# Patient Record
Sex: FEMALE | Race: WHITE | NOT HISPANIC OR LATINO | Employment: OTHER | URBAN - METROPOLITAN AREA
[De-identification: names, ages, dates, MRNs, and addresses within clinical notes are randomized per-mention and may not be internally consistent; named-entity substitution may affect disease eponyms.]

---

## 2018-01-30 ENCOUNTER — OFFICE VISIT (OUTPATIENT)
Dept: OBSTETRICS AND GYNECOLOGY | Facility: CLINIC | Age: 58
End: 2018-01-30
Payer: COMMERCIAL

## 2018-01-30 ENCOUNTER — HOSPITAL ENCOUNTER (OUTPATIENT)
Dept: RADIOLOGY | Facility: HOSPITAL | Age: 58
Discharge: HOME | End: 2018-01-30
Attending: OBSTETRICS & GYNECOLOGY
Payer: COMMERCIAL

## 2018-01-30 VITALS
HEIGHT: 67 IN | WEIGHT: 123.44 LBS | WEIGHT: 123 LBS | SYSTOLIC BLOOD PRESSURE: 118 MMHG | DIASTOLIC BLOOD PRESSURE: 74 MMHG | BODY MASS INDEX: 19.3 KG/M2 | BODY MASS INDEX: 19.37 KG/M2 | HEIGHT: 67 IN

## 2018-01-30 DIAGNOSIS — F41.9 ANXIETY: Chronic | ICD-10-CM

## 2018-01-30 DIAGNOSIS — Z12.4 ENCOUNTER FOR PAP SMEAR OF CERVIX WITH HPV DNA COTESTING: Primary | ICD-10-CM

## 2018-01-30 DIAGNOSIS — Z12.31 VISIT FOR SCREENING MAMMOGRAM: ICD-10-CM

## 2018-01-30 DIAGNOSIS — A60.00 GENITAL HERPES SIMPLEX, UNSPECIFIED SITE: ICD-10-CM

## 2018-01-30 PROCEDURE — 77067 SCR MAMMO BI INCL CAD: CPT | Mod: TC,PN

## 2018-01-30 PROCEDURE — 87624 HPV HI-RISK TYP POOLED RSLT: CPT

## 2018-01-30 PROCEDURE — 88175 CYTOPATH C/V AUTO FLUID REDO: CPT

## 2018-01-30 PROCEDURE — 99396 PREV VISIT EST AGE 40-64: CPT | Mod: S$GLB,,, | Performed by: OBSTETRICS & GYNECOLOGY

## 2018-01-30 PROCEDURE — 77067 SCR MAMMO BI INCL CAD: CPT | Mod: 26,,, | Performed by: RADIOLOGY

## 2018-01-30 PROCEDURE — 77063 BREAST TOMOSYNTHESIS BI: CPT | Mod: 26,,, | Performed by: RADIOLOGY

## 2018-01-30 PROCEDURE — 99999 PR PBB SHADOW E&M-EST. PATIENT-LVL III: CPT | Mod: PBBFAC,,, | Performed by: OBSTETRICS & GYNECOLOGY

## 2018-01-30 RX ORDER — VALACYCLOVIR HYDROCHLORIDE 500 MG/1
500 TABLET, FILM COATED ORAL DAILY
Qty: 30 TABLET | Refills: 11 | Status: SHIPPED | OUTPATIENT
Start: 2018-01-30 | End: 2019-07-10 | Stop reason: SDUPTHER

## 2018-01-30 RX ORDER — ALPRAZOLAM 0.25 MG/1
0.25 TABLET ORAL 3 TIMES DAILY PRN
Qty: 30 TABLET | Refills: 0 | Status: SHIPPED | OUTPATIENT
Start: 2018-01-30 | End: 2018-09-11 | Stop reason: SDUPTHER

## 2018-01-30 RX ORDER — VALACYCLOVIR HYDROCHLORIDE 500 MG/1
500 TABLET, FILM COATED ORAL 2 TIMES DAILY
Qty: 10 TABLET | Refills: 5 | Status: SHIPPED | OUTPATIENT
Start: 2018-01-30 | End: 2018-01-30 | Stop reason: SDUPTHER

## 2018-01-30 NOTE — PROGRESS NOTES
Chief Complaint   Patient presents with    Well Woman       History of Present Illness: Mely Viera is a 57 y.o. female that presents today 1/30/2018 for well gyn visit. She reports having a genital outbreak today. She reports 5 outbreaks in the last year.     Past Medical History:   Diagnosis Date    Anxiety     Depression     HSV (herpes simplex virus) infection     Sleep disturbance        Past Surgical History:   Procedure Laterality Date    NONE         Current Outpatient Prescriptions   Medication Sig Dispense Refill    alprazolam (XANAX) 0.25 MG tablet Take 1 tablet (0.25 mg total) by mouth 3 (three) times daily as needed for Anxiety. 30 tablet 0    ALPRAZolam (XANAX) 0.25 MG tablet Take 1 tablet (0.25 mg total) by mouth 3 (three) times daily as needed for Anxiety. 30 tablet 0    citalopram (CELEXA) 10 MG tablet Take 1 tablet (10 mg total) by mouth once daily. 30 tablet 2    valACYclovir (VALTREX) 500 MG tablet Take 1 tablet (500 mg total) by mouth once daily. 30 tablet 11     No current facility-administered medications for this visit.        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      Social History Main Topics    Smoking status: Never Smoker    Smokeless tobacco: Never Used    Alcohol use 1.2 oz/week     2 Glasses of wine per week    Drug use: No    Sexual activity: Not Currently     Partners: Male     Birth control/ protection: None, Post-menopausal     Other  "Topics Concern    None     Social History Narrative    None       OB History    Para Term  AB Living   1 1 1     1   SAB TAB Ectopic Multiple Live Births           1      # Outcome Date GA Lbr Ruddy/2nd Weight Sex Delivery Anes PTL Lv   1 Term 10/09/13   3.289 kg (7 lb 4 oz) F Vag-Spont EPI N DARON          Review of Symptoms:  GENERAL: Denies weight gain or weight loss. Feeling well overall.   SKIN: Denies rash or lesions.   HEAD: Denies head injury or headache.   NODES: Denies enlarged lymph nodes.   CHEST: Denies chest pain or shortness of breath.   CARDIOVASCULAR: Denies palpitations or left sided chest pain.   ABDOMEN: No abdominal pain, constipation, diarrhea, nausea, vomiting or rectal bleeding.   URINARY: No frequency, dysuria, hematuria, or burning on urination.  HEMATOLOGIC: No easy bruisability or excessive bleeding.   MUSCULOSKELETAL: Denies joint pain or swelling.     /74   Ht 5' 7" (1.702 m)   Wt 56 kg (123 lb 7.3 oz)   LMP 2014   Physical Exam:  APPEARANCE: Well nourished, well developed, in no acute distress.  SKIN: Normal skin turgor, no lesions.  NECK: Neck symmetric without masses   RESPIRATORY: Normal respiratory effort with no retractions or use of accessory muscles  CARDIOVASCULAR: Peripheral vascular system with no swelling no varicosities and palpation of pulses normal  LYMPHATIC: No enlargements of the lymph nodes noted in the neck, axillae, or groin  ABDOMEN: Soft. No tenderness or masses. No hepatosplenomegaly. No hernias.  BREASTS: Symmetrical, no skin changes or visible lesions. No palpable masses, nipple discharge or adenopathy bilaterally.  PELVIC: Normal external female genitalia without lesions. Normal hair distribution. Adequate perineal body, normal urethral meatus. Urethra with no masses.  Bladder nontender. Vagina moist and well rugated without lesions or discharge. Cervix pink and without lesions. No significant cystocele or rectocele. Bimanual exam " showed uterus normal size, shape, position, mobile and nontender. Adnexa without masses or tenderness. Urethra and bladder normal.   EXTREMITIES: No clubbing cyanosis or edema.    ASSESSMENT/PLAN:  Encounter for Pap smear of cervix with HPV DNA cotesting  -     Liquid-based pap smear, screening  -     HPV High Risk Genotypes, PCR    Visit for screening mammogram  -     Cancel: Mammo Digital Screening Bilat With CAD; Future; Expected date: 01/30/2018    Anxiety    Genital herpes simplex, unspecified site  -     Discontinue: valACYclovir (VALTREX) 500 MG tablet; Take 1 tablet (500 mg total) by mouth 2 (two) times daily.  Dispense: 10 tablet; Refill: 5  -     valACYclovir (VALTREX) 500 MG tablet; Take 1 tablet (500 mg total) by mouth once daily.  Dispense: 30 tablet; Refill: 11    Other orders  -     ALPRAZolam (XANAX) 0.25 MG tablet; Take 1 tablet (0.25 mg total) by mouth 3 (three) times daily as needed for Anxiety.  Dispense: 30 tablet; Refill: 0          Patient was counseled today on Pap guidelines, recommendation for pelvic exams, mammograms every other year after the age of 40 and annually after the age of 50, Colonoscopy after the age of 50, Dexa Bone Scan and calcium and vitamin D supplementation in menopause and to see her PCP for other health maintenance.   FOLLOW-UP:prn

## 2018-02-02 LAB
HPV16 AG SPEC QL: NEGATIVE
HPV16+18+H RISK 12 DNA CVX-IMP: NEGATIVE
HPV18 DNA SPEC QL NAA+PROBE: NEGATIVE

## 2018-07-31 ENCOUNTER — OFFICE VISIT (OUTPATIENT)
Dept: FAMILY MEDICINE | Facility: CLINIC | Age: 58
End: 2018-07-31
Payer: COMMERCIAL

## 2018-07-31 VITALS
HEART RATE: 71 BPM | SYSTOLIC BLOOD PRESSURE: 120 MMHG | BODY MASS INDEX: 19.93 KG/M2 | TEMPERATURE: 98 F | OXYGEN SATURATION: 99 % | RESPIRATION RATE: 16 BRPM | DIASTOLIC BLOOD PRESSURE: 82 MMHG | WEIGHT: 127 LBS | HEIGHT: 67 IN

## 2018-07-31 DIAGNOSIS — I49.9 IRREGULAR HEART BEAT: ICD-10-CM

## 2018-07-31 DIAGNOSIS — E83.42 HYPOMAGNESEMIA: ICD-10-CM

## 2018-07-31 DIAGNOSIS — E83.39 HYPOPHOSPHATEMIA: ICD-10-CM

## 2018-07-31 DIAGNOSIS — R55 SYNCOPE AND COLLAPSE: ICD-10-CM

## 2018-07-31 DIAGNOSIS — G47.09 OTHER INSOMNIA: ICD-10-CM

## 2018-07-31 DIAGNOSIS — F41.1 GENERALIZED ANXIETY DISORDER: ICD-10-CM

## 2018-07-31 DIAGNOSIS — T67.5XXD HEAT EXHAUSTION, SUBSEQUENT ENCOUNTER: Primary | ICD-10-CM

## 2018-07-31 PROCEDURE — 99203 OFFICE O/P NEW LOW 30 MIN: CPT | Mod: S$GLB,,, | Performed by: FAMILY MEDICINE

## 2018-07-31 PROCEDURE — 3008F BODY MASS INDEX DOCD: CPT | Mod: CPTII,S$GLB,, | Performed by: FAMILY MEDICINE

## 2018-07-31 PROCEDURE — 99999 PR PBB SHADOW E&M-EST. PATIENT-LVL IV: CPT | Mod: PBBFAC,,, | Performed by: FAMILY MEDICINE

## 2018-08-01 ENCOUNTER — LAB VISIT (OUTPATIENT)
Dept: LAB | Facility: HOSPITAL | Age: 58
End: 2018-08-01
Attending: FAMILY MEDICINE
Payer: COMMERCIAL

## 2018-08-01 DIAGNOSIS — E83.42 HYPOMAGNESEMIA: ICD-10-CM

## 2018-08-01 DIAGNOSIS — R55 SYNCOPE AND COLLAPSE: ICD-10-CM

## 2018-08-01 DIAGNOSIS — E83.39 HYPOPHOSPHATEMIA: ICD-10-CM

## 2018-08-01 LAB
ALBUMIN SERPL BCP-MCNC: 4.1 G/DL
ALP SERPL-CCNC: 76 U/L
ALT SERPL W/O P-5'-P-CCNC: 25 U/L
ANION GAP SERPL CALC-SCNC: 8 MMOL/L
AST SERPL-CCNC: 26 U/L
BILIRUB SERPL-MCNC: 0.9 MG/DL
BUN SERPL-MCNC: 12 MG/DL
CALCIUM SERPL-MCNC: 9.4 MG/DL
CHLORIDE SERPL-SCNC: 105 MMOL/L
CO2 SERPL-SCNC: 28 MMOL/L
CREAT SERPL-MCNC: 0.9 MG/DL
EST. GFR  (AFRICAN AMERICAN): >60 ML/MIN/1.73 M^2
EST. GFR  (NON AFRICAN AMERICAN): >60 ML/MIN/1.73 M^2
GLUCOSE SERPL-MCNC: 100 MG/DL
MAGNESIUM SERPL-MCNC: 2.5 MG/DL
PHOSPHATE SERPL-MCNC: 3.4 MG/DL
POTASSIUM SERPL-SCNC: 4.3 MMOL/L
PROT SERPL-MCNC: 7.6 G/DL
SODIUM SERPL-SCNC: 141 MMOL/L
TSH SERPL DL<=0.005 MIU/L-ACNC: 3.16 UIU/ML

## 2018-08-01 PROCEDURE — 36415 COLL VENOUS BLD VENIPUNCTURE: CPT | Mod: PO

## 2018-08-01 PROCEDURE — 84100 ASSAY OF PHOSPHORUS: CPT

## 2018-08-01 PROCEDURE — 84443 ASSAY THYROID STIM HORMONE: CPT

## 2018-08-01 PROCEDURE — 80053 COMPREHEN METABOLIC PANEL: CPT

## 2018-08-01 PROCEDURE — 83735 ASSAY OF MAGNESIUM: CPT

## 2018-08-02 ENCOUNTER — TELEPHONE (OUTPATIENT)
Dept: FAMILY MEDICINE | Facility: CLINIC | Age: 58
End: 2018-08-02

## 2018-08-02 DIAGNOSIS — G44.319 ACUTE POST-TRAUMATIC HEADACHE, NOT INTRACTABLE: Primary | ICD-10-CM

## 2018-08-02 RX ORDER — NAPROXEN 500 MG/1
TABLET ORAL
Qty: 30 TABLET | Refills: 0 | Status: CANCELLED | OUTPATIENT
Start: 2018-08-02

## 2018-08-02 RX ORDER — NAPROXEN 500 MG/1
TABLET ORAL
Qty: 20 TABLET | Refills: 0 | Status: SHIPPED | OUTPATIENT
Start: 2018-08-02 | End: 2021-06-08

## 2018-08-02 NOTE — TELEPHONE ENCOUNTER
----- Message from Ngozi Mcclendon sent at 8/2/2018  9:38 AM CDT -----  Contact: patient  Patient says that Excedrin Migraine is no longer working and would like something called in.  Please call [phone]     [facility] [address] [place], [place] [id number] [place] [address] AT [facility] [address]  [address] [address] [address]  Phone: [phone] Fax: [phone]

## 2018-08-02 NOTE — TELEPHONE ENCOUNTER
Please can you tell patient that I will send the prescription for Naproxen 500 mg BID as needed for headache, if symptoms get worse to let us know. Please give patient ER warning signs. Thanks

## 2018-08-02 NOTE — TELEPHONE ENCOUNTER
Pt stating that she is still getting migraines and the OTC Excedrin migraine medication is not alleviating the pain. Pt requesting medication be sent in for migraine. Please Advise

## 2018-08-02 NOTE — TELEPHONE ENCOUNTER
Pt states that she is rotating Excedrin migraine and motrin OTC. She does not think tylenol will work due to the severity

## 2018-08-03 ENCOUNTER — OFFICE VISIT (OUTPATIENT)
Dept: CARDIOLOGY | Facility: CLINIC | Age: 58
End: 2018-08-03
Payer: COMMERCIAL

## 2018-08-03 VITALS — HEIGHT: 67 IN | BODY MASS INDEX: 19.76 KG/M2 | WEIGHT: 125.88 LBS

## 2018-08-03 DIAGNOSIS — R00.2 PALPITATIONS: ICD-10-CM

## 2018-08-03 DIAGNOSIS — R55 SYNCOPE AND COLLAPSE: ICD-10-CM

## 2018-08-03 PROCEDURE — 3008F BODY MASS INDEX DOCD: CPT | Mod: CPTII,S$GLB,, | Performed by: INTERNAL MEDICINE

## 2018-08-03 PROCEDURE — 99204 OFFICE O/P NEW MOD 45 MIN: CPT | Mod: S$GLB,,, | Performed by: INTERNAL MEDICINE

## 2018-08-03 PROCEDURE — 99999 PR PBB SHADOW E&M-EST. PATIENT-LVL II: CPT | Mod: PBBFAC,,, | Performed by: INTERNAL MEDICINE

## 2018-08-03 PROCEDURE — 93000 ELECTROCARDIOGRAM COMPLETE: CPT | Mod: S$GLB,,, | Performed by: INTERNAL MEDICINE

## 2018-08-03 NOTE — PROGRESS NOTES
Subjective:    Patient ID:  Mely Viera is a 57 y.o. female who presents for evaluation of Irregular Heart Beat and Loss of Consciousness      HPI57 yo WF with hx of syncopal episode while at the beach several weeks ago. Went to the hospital and kept over night and was told it was related to heat and dehydration. Has felt fatigued since then but no further episodes.      Resting EKG shows NSSTT changes.    Review of Systems   Constitution: Negative for decreased appetite, fever, weakness, malaise/fatigue, weight gain and weight loss.   HENT: Negative for hearing loss and nosebleeds.    Eyes: Negative for visual disturbance.   Cardiovascular: Positive for palpitations and syncope. Negative for chest pain, claudication, cyanosis, dyspnea on exertion, irregular heartbeat, leg swelling, near-syncope, orthopnea and paroxysmal nocturnal dyspnea.   Respiratory: Negative for cough, hemoptysis, shortness of breath, sleep disturbances due to breathing, snoring and wheezing.    Endocrine: Negative for cold intolerance, heat intolerance, polydipsia and polyuria.   Hematologic/Lymphatic: Negative for adenopathy and bleeding problem. Does not bruise/bleed easily.   Skin: Negative for color change, itching, poor wound healing, rash and suspicious lesions.   Musculoskeletal: Negative for arthritis, back pain, falls, joint pain, joint swelling, muscle cramps, muscle weakness and myalgias.   Gastrointestinal: Negative for bloating, abdominal pain, change in bowel habit, constipation, flatus, heartburn, hematemesis, hematochezia, hemorrhoids, jaundice, melena, nausea and vomiting.   Genitourinary: Negative for bladder incontinence, decreased libido, frequency, hematuria, hesitancy and urgency.   Neurological: Negative for brief paralysis, difficulty with concentration, excessive daytime sleepiness, dizziness, focal weakness, headaches, light-headedness, loss of balance, numbness and vertigo.   Psychiatric/Behavioral: Negative for  "altered mental status, depression and memory loss. The patient does not have insomnia and is not nervous/anxious.    Allergic/Immunologic: Negative for environmental allergies, hives and persistent infections.        Objective:    Physical Exam   Constitutional: She is oriented to person, place, and time. She appears well-developed and well-nourished.   Ht 5' 7" (1.702 m)   Wt 57.1 kg (125 lb 14.1 oz)   LMP 08/11/2014   BMI 19.72 kg/m²      HENT:   Head: Normocephalic and atraumatic.   Right Ear: External ear normal.   Left Ear: External ear normal.   Nose: Nose normal.   Mouth/Throat: Oropharynx is clear and moist.   Eyes: Conjunctivae, EOM and lids are normal. Pupils are equal, round, and reactive to light. Right eye exhibits no discharge. Left eye exhibits no discharge. Right conjunctiva has no hemorrhage. No scleral icterus.   Neck: Normal range of motion. Neck supple. No JVD present. No tracheal deviation present. No thyromegaly present.   Cardiovascular: Normal rate, regular rhythm, normal heart sounds and intact distal pulses.  Exam reveals no gallop and no friction rub.    No murmur heard.  Pulmonary/Chest: Effort normal and breath sounds normal. No respiratory distress. She has no wheezes. She has no rales. She exhibits no tenderness. Breasts are symmetrical.   Abdominal: Soft. Bowel sounds are normal. She exhibits no distension and no mass. There is no hepatosplenomegaly or hepatomegaly. There is no tenderness. There is no rebound and no guarding.   Musculoskeletal: Normal range of motion. She exhibits no edema or tenderness.   Lymphadenopathy:     She has no cervical adenopathy.   Neurological: She is alert and oriented to person, place, and time. She displays normal reflexes. No cranial nerve deficit. Coordination normal.   Skin: Skin is warm and dry. No rash noted. No erythema. No pallor.   Psychiatric: She has a normal mood and affect. Her behavior is normal. Judgment and thought content normal. "   Nursing note and vitals reviewed.        Assessment:       1. Syncope and collapse    2. Palpitations    3       Abnormal EKG     Plan:     Because of abnormal ekg and severity of event will do stress echo.      Orders Placed This Encounter   Procedures    Echocardiogram stress test     Follow-up in about 6 months (around 2/3/2019).

## 2018-08-03 NOTE — LETTER
August 3, 2018      Elisha Street MD  [address] [facility] [address] [place] [address]           [facility]  [address] [facility] [address] [place] [address]  Phone: [phone]          Patient: Mely Viera   MR Number: [id number]   YOB: 1960   Date of Visit: 8/3/2018       Dear Dr. Elisha Street:    Thank you for referring Mely Viera to me for evaluation. Attached you will find relevant portions of my assessment and plan of care.    If you have questions, please do not hesitate to call me. I look forward to following Mely Viera along with you.    Sincerely,    Alberto Young Jr., MD    Enclosure  CC:  No Recipients    If you would like to receive this communication electronically, please contact [email or web address] or [phone] to request more information on [facility] Link access.    For providers and/or their staff who would like to refer a patient to [facility], please contact us through our one-stop-shop provider referral line, [facility] , at [phone].    If you feel you have received this communication in error or would no longer like to receive these types of communications, please e-mail [email or web address]

## 2018-08-06 DIAGNOSIS — R00.2 PALPITATIONS: Primary | ICD-10-CM

## 2018-08-10 DIAGNOSIS — Z12.11 COLON CANCER SCREENING: ICD-10-CM

## 2018-08-16 ENCOUNTER — CLINICAL SUPPORT (OUTPATIENT)
Dept: CARDIOLOGY | Facility: CLINIC | Age: 58
End: 2018-08-16
Attending: INTERNAL MEDICINE
Payer: COMMERCIAL

## 2018-08-16 VITALS — HEIGHT: 67 IN | BODY MASS INDEX: 19.62 KG/M2 | WEIGHT: 125 LBS

## 2018-08-16 DIAGNOSIS — R55 SYNCOPE AND COLLAPSE: ICD-10-CM

## 2018-08-16 DIAGNOSIS — R00.2 PALPITATIONS: ICD-10-CM

## 2018-08-16 PROCEDURE — 99999 PR PBB SHADOW E&M-EST. PATIENT-LVL I: CPT | Mod: PBBFAC,,,

## 2018-08-16 PROCEDURE — 93351 STRESS TTE COMPLETE: CPT | Mod: S$GLB,,, | Performed by: INTERNAL MEDICINE

## 2018-08-17 ENCOUNTER — TELEPHONE (OUTPATIENT)
Dept: CARDIOLOGY | Facility: CLINIC | Age: 58
End: 2018-08-17

## 2018-08-17 LAB
ASCENDING AORTA: 2.28 CM
BSA FOR ECHO PROCEDURE: 1.64 M2
CV ECHO LV RWT: 0.41 CM
CV STRESS BASE HR: 87
CVPEAKDIABP: 57
CVPEAKSYSBP: 169
CVRESTDIABP: 91
CVRESTSYSBP: 166
DOP CALC LVOT AREA: 3.08 CM2
DOP CALC LVOT DIAMETER: 1.98 CM
DOP CALC LVOT STROKE VOLUME: 59.09 CM3
DOP CALCLVOT PEAK VEL VTI: 19.2 CM
E WAVE DECELERATION TIME: 241.35 MSEC
E/A RATIO: 1.19
E/E' RATIO: 7.26
ECHO LV POSTERIOR WALL: 0.83 CM (ref 0.6–1.1)
FRACTIONAL SHORTENING: 33 % (ref 28–44)
INTERVENTRICULAR SEPTUM: 0.85 CM (ref 0.6–1.1)
IVRT: 0.1 MSEC
LA MAJOR: 3.62 CM
LA MINOR: 4.13 CM
LA WIDTH: 3.3 CM
LEFT ATRIUM SIZE: 2.66 CM
LEFT ATRIUM VOLUME INDEX: 17.6 ML/M2
LEFT ATRIUM VOLUME: 28.79 CM3
LEFT INTERNAL DIMENSION IN SYSTOLE: 2.77 CM (ref 2.1–4)
LEFT VENTRICLE MASS INDEX: 64.4 G/M2
LEFT VENTRICULAR INTERNAL DIMENSION IN DIASTOLE: 4.14 CM (ref 3.5–6)
LEFT VENTRICULAR MASS: 105.58 G
LV LATERAL E/E' RATIO: 7.67
LV SEPTAL E/E' RATIO: 6.9
MV PEAK A VEL: 0.58 M/S
MV PEAK E VEL: 0.69 M/S
MV STENOSIS PRESSURE HALF TIME: 69.99 MS
MV VALVE AREA P 1/2 METHOD: 3.14 CM2
PULM VEIN S/D RATIO: 1.27
PV PEAK D VEL: 0.48 M/S
PV PEAK S VEL: 0.61 M/S
RA MAJOR: 4.1 CM
RA WIDTH: 3.54 CM
SINUS: 2.74 CM
STJ: 2.18 CM
STRESS ECHO POST ESTIMATED WORKLOAD: 13 METS
STRESS ECHO POST EXERCISE DUR MIN: 7 MIN
STRESS ECHO POST EXERCISE DUR SEC: 33
TDI LATERAL: 0.09
TDI SEPTAL: 0.1
TDI: 0.1

## 2018-08-20 ENCOUNTER — OFFICE VISIT (OUTPATIENT)
Dept: FAMILY MEDICINE | Facility: CLINIC | Age: 58
End: 2018-08-20
Payer: COMMERCIAL

## 2018-08-20 VITALS
SYSTOLIC BLOOD PRESSURE: 120 MMHG | WEIGHT: 126.31 LBS | DIASTOLIC BLOOD PRESSURE: 88 MMHG | BODY MASS INDEX: 19.82 KG/M2 | HEIGHT: 67 IN | OXYGEN SATURATION: 99 % | HEART RATE: 74 BPM

## 2018-08-20 DIAGNOSIS — G43.809 OTHER MIGRAINE WITHOUT STATUS MIGRAINOSUS, NOT INTRACTABLE: ICD-10-CM

## 2018-08-20 DIAGNOSIS — R00.8 OTHER ABNORMALITIES OF HEART BEAT: ICD-10-CM

## 2018-08-20 DIAGNOSIS — G47.09 OTHER INSOMNIA: ICD-10-CM

## 2018-08-20 DIAGNOSIS — R42 DIZZINESS AND GIDDINESS: Primary | ICD-10-CM

## 2018-08-20 DIAGNOSIS — G44.89 CHRONIC MIXED HEADACHE SYNDROME: ICD-10-CM

## 2018-08-20 DIAGNOSIS — H93.13 TINNITUS OF BOTH EARS: ICD-10-CM

## 2018-08-20 PROCEDURE — 3008F BODY MASS INDEX DOCD: CPT | Mod: CPTII,S$GLB,, | Performed by: FAMILY MEDICINE

## 2018-08-20 PROCEDURE — 99999 PR PBB SHADOW E&M-EST. PATIENT-LVL IV: CPT | Mod: PBBFAC,,, | Performed by: FAMILY MEDICINE

## 2018-08-20 PROCEDURE — 99214 OFFICE O/P EST MOD 30 MIN: CPT | Mod: S$GLB,,, | Performed by: FAMILY MEDICINE

## 2018-08-20 RX ORDER — TRAZODONE HYDROCHLORIDE 50 MG/1
50 TABLET ORAL NIGHTLY
Qty: 30 TABLET | Refills: 5 | Status: SHIPPED | OUTPATIENT
Start: 2018-08-20 | End: 2018-09-11 | Stop reason: SINTOL

## 2018-08-20 NOTE — PROGRESS NOTES
Subjective:       Patient ID: Mely Viera is a 57 y.o. female.    Chief Complaint: Follow-up; Migraine (states 4 migraines in 30days); and Dizziness (states everyday )    The patient also complains of insomnia and anxiety, the symptoms are getting worse, she states that she has been having more anxiety than usual because of the symptoms that she is having.  She still she felt tired because she has not been able to rest at night.      Migraine    This is a chronic problem. The current episode started more than 1 month ago. The problem occurs intermittently. The problem has been rapidly worsening. The pain is located in the bilateral region. The pain does not radiate. The pain quality is not similar to prior headaches. The quality of the pain is described as band-like, shooting and squeezing. The pain is moderate. Associated symptoms include dizziness, insomnia, nausea, a visual change and weakness. Pertinent negatives include no abdominal pain, abnormal behavior, anorexia, back pain, blurred vision, coughing, drainage, ear pain, eye pain, facial sweating, fever, hearing loss, loss of balance, muscle aches, neck pain, scalp tenderness, seizures, sinus pressure or vomiting. Associated symptoms comments: The patient had an syncopal episode which require her to go to the emergency room.  She also went to see the cardiologist secondary to palpitations and abnormal heartbeat and had echocardiogram was told that everything was okay.  She did not have a Holter monitor in place yet.. Nothing aggravates the symptoms. She has tried nothing for the symptoms. The treatment provided moderate relief. Her past medical history is significant for migraine headaches. There is no history of cancer, cluster headaches, hypertension or recent head traumas.   Dizziness:   Chronicity:  Recurrent  Onset:  More than 1 month ago  Progression since onset:  Rapidly worsening  Frequency:  Every few days  Severity:  Moderate  Duration:  Very  brief  Dizziness characteristics:  Sensation of movement, giddiness, blacking out, lightheaded/impending faint, walking on uneven surface and off-balance  Frequency of Spells:  Daily   Associated symptoms: headaches, nausea and weakness.no hearing loss, no ear pain, no fever, no vomiting and no chest pain.  Exacerbated by: When she wakes up but sometimes she is doing regular activities in the morning is still having the spells.  Treatments tried:  Nothing  Improvements on treatment:  Moderateno strokes, no cardiac surgery, no neurologic disease, no head trauma, no ear trauma, no ear surgery and no head trauma.         Review of Systems   Constitutional: Positive for fatigue. Negative for activity change and fever.   HENT: Negative for ear pain, hearing loss and sinus pressure.    Eyes: Negative for blurred vision and pain.   Respiratory: Negative for cough and shortness of breath.    Cardiovascular: Negative for chest pain and leg swelling.   Gastrointestinal: Positive for nausea. Negative for abdominal pain, anorexia and vomiting.   Musculoskeletal: Negative for back pain and neck pain.   Neurological: Positive for dizziness, weakness and headaches. Negative for seizures and loss of balance.   Psychiatric/Behavioral: Positive for sleep disturbance. Negative for agitation and behavioral problems. The patient is nervous/anxious and has insomnia.        Objective:      Physical Exam   Constitutional: She appears well-developed and well-nourished. No distress.   HENT:   Head: Normocephalic and atraumatic.   Right Ear: External ear normal.   Left Ear: External ear normal.   Nose: Nose normal.   Mouth/Throat: Oropharynx is clear and moist. No oropharyngeal exudate.   Eyes: Conjunctivae are normal. Pupils are equal, round, and reactive to light. Right eye exhibits no discharge. Left eye exhibits no discharge. No scleral icterus.   Neck: Neck supple. No JVD present. No tracheal deviation present. No thyromegaly present.    Cardiovascular: Normal rate and regular rhythm.   No murmur heard.  Pulmonary/Chest: Effort normal and breath sounds normal. No respiratory distress. She has no wheezes.   Abdominal: She exhibits no distension and no mass. There is no tenderness. There is no guarding.   Musculoskeletal: She exhibits no tenderness or deformity.   Lymphadenopathy:     She has no cervical adenopathy.   Neurological: She displays normal reflexes. No cranial nerve deficit. Coordination normal.   Skin: No rash noted. She is not diaphoretic. No erythema. No pallor.   Psychiatric: She has a normal mood and affect. Her behavior is normal. Judgment and thought content normal.   Nursing note and vitals reviewed.      Assessment:       1. Dizziness and giddiness    2. Other abnormalities of heart beat    3. Other migraine without status migrainosus, not intractable    4. Other insomnia    5. Chronic mixed headache syndrome    6. Tinnitus of both ears        Plan:       Dizziness and giddiness:  Worsening  -     Ambulatory referral to ENT  -     Cancel: Holter monitor - 48 hour; Future  -     Holter monitor - 48 hour (CUPID ONLY-[facility], [place], [facility]); Future    Other abnormalities of heart beat:  Worsening  -     Cancel: Holter monitor - 48 hour; Future    Other migraine without status migrainosus, not intractable:  Worsening  -     MRI Brain Without Contrast; Future; Expected date: 08/27/2018    Other insomnia:  Worsening       traZODone (DESYREL) 50 MG tablet; Take 1 tablet (50 mg total) by mouth every evening.  Dispense: 30 tablet; Refill: 5    Chronic mixed headache syndrome:  Worsening  -     MRI Brain Without Contrast; Future; Expected date: 08/27/2018    Tinnitus of both ears:  Worsening  -     MRI Brain Without Contrast; Future; Expected date: 08/27/2018  -     Ambulatory referral to ENT      Will order a Holter monitor secondary to the patient palpitations and history of abnormal heartbeat, the patient  already seen a cardiologist.  Will order MRI of the brain secondary to the patient having dizziness and worsening headaches.  Will refer the patient to the ENT specialist secondary to worsening symptoms of dizziness to rule out any acute abnormalities in the inner ear.  Will place the patient on trazodone at bedtime to sleep, the patient used Xanax for MRI secondary to the patient anxiety and claustrophobic.  Blood work was reviewed and okay. Patient agreed with assessment and plan. Patient verbalized understanding.

## 2018-08-20 NOTE — PATIENT INSTRUCTIONS
Insomnia  Insomnia is repeated difficulty going to sleep or staying asleep, or both. Whether you have insomnia is not defined by a specific amount of sleep. Different people need different amounts of sleep, and you may need more or less sleep at different times of your life.  There are 3 major types of insomnia:  short-term, chronic, and other.  Short-term, or acute insomnia lasts less than 3 months.  The symptoms are temporary and can be linked directly to a stressor, such as the death of a loved one, financial problems, or a new physical problem.  Short-term insomnia stops when the stressor resolves or the person adapts to its presence.  Chronic insomnia occurs at least 3 times a week and lasts longer than 3 months.  Chronic insomnia can occur when either the cause of the sleeping problem is not clear, or the insomnia does not get better when the stressor is resolved. A number of other criteria are also used to make the diagnosis of chronic insomnia.   Other insomnia is the third type of insomnia-related sleep disorders.  This description applies to people who have problems getting to sleep or staying asleep, but do not meet all of the factors that describe either short-term or chronic insomnia.    Many things cause insomnia. Different people may have different causes. It can be from an underlying medical or psychological condition, or lifestyle. It can also be primary insomnia, which means no cause can be found.  Causes of insomnia include:  · Chronic medical problems- heart disease, gastrointestinal problems, hormonal changes, breathing problems  · Anxiety  · Stress  · Depression  · Pain  · Work schedule  · Sleep apnea  · Illegal drugs  · Certain medicines  Many different medidcines can affect your sleep, such as stimulants, caffeine, alcohol, some decongestants, and diet pills. Other medicines may include some types of blood pressure pills, steroids, asthma medicines, antihistamines, antidepressants,  seizure medicines and statins. Not all of these will affect your sleep, and they shouldnt be stopped without talking to your doctor.  Symptoms of insomnia can include:  · Lying awake for long periods at night before falling asleep  · Waking up several times during the night  · Waking up early in the morning and not being able to get back to sleep  · Feeling tired and not refreshed by sleep  · Not being able to function properly during the day and finding it hard to concentrate  · Irritability  · Tiredness and fatigue during the day  Home care  1. Review your medicines with your doctor or pharmacist to find out if they can cause insomnia. Not all medicines will affect your sleep, but they shouldn't be stopped without reviewing them with your doctor. There may be serious side effects and consequences from suddenly stopping your medicines. Not taking them may cause strokes, heart attacks, and many other problems.  2. Caffeine, smoking and alcohol also affect sleep. Limit your daily use and do not use these before bedtime. Alcohol may make you sleepy at first, but as its effects wear off, you may awaken a few hours later and have trouble returning to sleep.  3. Do not exercise, eat or drink large amounts of liquid within 2 hours of your bedtime.  4. Improve your sleep habits. Have a fixed bed and wake-up time. Try to keep noise, light and heat in your bedroom at a comfortable level. Try using earplugs or eyeshades if needed.   5. Avoid watching TV in bed.  6. If you do not fall asleep within 30 minutes, try to relax by reading or listening to soft music.  7. Limit daytime napping to one 30 minute period, early in the day.  8. Get regular exercise. Find other ways to lessen your stress level.  9. If a medicine was prescribed to help reset your sleep patterns, take it as directed. Sleeping pills are intended for short-term use, only. If taken for too long, the effect wears off while the risk of physical addiction and  psychological dependence increases.  Sleep diary  If the cause isnt obvious and it is not improving, try keeping a sleep diary for a couple of weeks. Include in it:  · The time you go to bed  · How long it takes to fall asleep  · How many times you wake up  · What time you wake up  · Your meal times and what you eat  · What time you drink alcohol  · Your exercise habits and times  Follow-up care  Follow up with your healthcare provider, or as advised. If X-rays or CT scans were done, you will be notified if there is a change in the reading, especially if it affects treatment.  Call 911  Call 911 if any of these occur:  · Trouble breathing  · Confusion or trouble waking  · Fainting or loss of consciousness  · Rapid heart rate  · New chest, arm, shoulder, neck or upper back pain  · Trouble with speech or vision, weakness of an arm or leg  · Trouble walking or talking, loss of balance, numbness or weakness in one side of your body, facial droop  When to seek medical advice  Call your healthcare provider right away if any of these occur:  · Extreme restlessness or irritability  · Confusion or hallucinations (seeing or hearing things that are not there)  · Anxiety, depression  · Several days without sleeping  Date Last Reviewed: 11/19/2015  © 7548-4578 [employer] [address] [place] [address], [place] [address]. All rights reserved. This information is not intended as a substitute for professional medical care. Always follow your healthcare professional's instructions.

## 2018-08-22 ENCOUNTER — TELEPHONE (OUTPATIENT)
Dept: FAMILY MEDICINE | Facility: CLINIC | Age: 58
End: 2018-08-22

## 2018-08-22 DIAGNOSIS — G44.89 CHRONIC MIXED HEADACHE SYNDROME: ICD-10-CM

## 2018-08-22 DIAGNOSIS — H93.13 TINNITUS OF BOTH EARS: ICD-10-CM

## 2018-08-22 NOTE — TELEPHONE ENCOUNTER
Patient decided to go with [facility] to complete MRI of the brain because of insurance purposes. A new EXTERNAL referral is needed to be faxed to [facility]. Also the old INTERNAL referral can only be retracted by Gaby Aguila.

## 2018-08-22 NOTE — TELEPHONE ENCOUNTER
----- Message from RT Broderick sent at 8/22/2018  3:02 PM CDT -----  Contact: glenda Velasco, [phone]   Krista preservices, [phone], the pt need new orders for  an MRI scan w/out contrast, thanks, she will be [facility], thanks.

## 2018-08-22 NOTE — TELEPHONE ENCOUNTER
----- Message from Indigo Lehman sent at 8/22/2018 11:48 AM CDT -----  Type: Needs Medical Advice    Who Called:  Sridevi- [employer] [place]  Symptoms (please be specific):  Na  How long has patient had these symptoms:  Kesha  Pharmacy name and phone #:  [facility]  Best Call Back Number: [phone]  Additional Information: [facility] has not received order for MRI, fax # [phone]

## 2018-08-22 NOTE — TELEPHONE ENCOUNTER
Advised that we are pending a new external referral to be issued by provider but she will not be back in office until Friday 8/24.

## 2018-08-22 NOTE — TELEPHONE ENCOUNTER
New referral needed for external MRI to be completed. PT has appt scheduled at [facility] on 8/31. Referral Pended. Please Advise.

## 2018-08-22 NOTE — TELEPHONE ENCOUNTER
----- Message from Silvia Foy sent at 8/22/2018  1:57 PM CDT -----  Contact: Sridevi RAMIREZ calling states that the pt was supposed to be go to [facility] to do MRI and a person from [facility] cancel it so they are needing a new order sent to [facility],please member schedule appt  8/31 so need this done as soon as possible....[phone] option 2 then 3

## 2018-08-27 ENCOUNTER — CLINICAL SUPPORT (OUTPATIENT)
Dept: AUDIOLOGY | Facility: CLINIC | Age: 58
End: 2018-08-27
Payer: COMMERCIAL

## 2018-08-27 ENCOUNTER — OFFICE VISIT (OUTPATIENT)
Dept: OTOLARYNGOLOGY | Facility: CLINIC | Age: 58
End: 2018-08-27
Payer: COMMERCIAL

## 2018-08-27 DIAGNOSIS — R42 DIZZINESS AND GIDDINESS: Primary | ICD-10-CM

## 2018-08-27 DIAGNOSIS — R42 DIZZINESS AND GIDDINESS: ICD-10-CM

## 2018-08-27 PROCEDURE — 99244 OFF/OP CNSLTJ NEW/EST MOD 40: CPT | Mod: S$GLB,,, | Performed by: OTOLARYNGOLOGY

## 2018-08-27 PROCEDURE — 92541 SPONTANEOUS NYSTAGMUS TEST: CPT | Mod: S$GLB,,, | Performed by: AUDIOLOGIST

## 2018-08-27 PROCEDURE — 92542 POSITIONAL NYSTAGMUS TEST: CPT | Mod: 59,S$GLB,, | Performed by: AUDIOLOGIST

## 2018-08-27 PROCEDURE — 99999 PR PBB SHADOW E&M-EST. PATIENT-LVL I: CPT | Mod: PBBFAC,,,

## 2018-08-27 PROCEDURE — 99999 PR PBB SHADOW E&M-EST. PATIENT-LVL I: CPT | Mod: PBBFAC,,, | Performed by: OTOLARYNGOLOGY

## 2018-08-27 RX ORDER — MECLIZINE HCL 12.5 MG 12.5 MG/1
12.5 TABLET ORAL 3 TIMES DAILY PRN
Qty: 15 TABLET | Refills: 0 | Status: SHIPPED | OUTPATIENT
Start: 2018-08-27 | End: 2019-07-10
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
## 2018-08-27 NOTE — LETTER
August 27, 2018      Elisha Street MD  [address] [facility] [address] [place] [address]           [facility]  [address] [facility] [address] [place] [address]  Phone: [phone]  Fax: [phone]          Patient: Mely Viera   MR Number: [id number]   YOB: 1960   Date of Visit: 8/27/2018       Dear Dr. Elisha Street:    Thank you for referring Mely Viera to me for evaluation. Attached you will find relevant portions of my assessment and plan of care.    If you have questions, please do not hesitate to call me. I look forward to following Mely Viera along with you.    Sincerely,    Yosi Alonzo MD    Enclosure  CC:  No Recipients    If you would like to receive this communication electronically, please contact [email or web address] or [phone] to request more information on [employer] access.    For providers and/or their staff who would like to refer a patient to [facility], please contact us through our one-stop-shop provider referral line, [facility], at [phone].    If you feel you have received this communication in error or would no longer like to receive these types of communications, please e-mail [email or web address]

## 2018-08-28 ENCOUNTER — CLINICAL SUPPORT (OUTPATIENT)
Dept: CARDIOLOGY | Facility: CLINIC | Age: 58
End: 2018-08-28
Attending: FAMILY MEDICINE
Payer: COMMERCIAL

## 2018-08-28 ENCOUNTER — TELEPHONE (OUTPATIENT)
Dept: FAMILY MEDICINE | Facility: CLINIC | Age: 58
End: 2018-08-28

## 2018-08-28 DIAGNOSIS — R42 DIZZINESS AND GIDDINESS: ICD-10-CM

## 2018-08-28 PROCEDURE — 93224 XTRNL ECG REC UP TO 48 HRS: CPT | Mod: S$GLB,,, | Performed by: INTERNAL MEDICINE

## 2018-08-28 NOTE — TELEPHONE ENCOUNTER
Spoke to pt and advise that order was faxed over to [facility]. Also cancelled appt for MRI at [facility] since she wants to go to another facility.

## 2018-08-28 NOTE — TELEPHONE ENCOUNTER
----- Message from Nelda Moya sent at 8/28/2018  8:42 AM CDT -----  Contact: Mely  Type: Needs Medical Advice    Who Called:  patient  Best Call Back Number: [phone]  Additional Information: Calling to check if MRI order was sent Friday 8/24/18 to [facility] [address]. Please call to advise. Thanks!

## 2018-08-31 DIAGNOSIS — I49.9 VENTRICULAR ARRHYTHMIA: Primary | ICD-10-CM

## 2018-08-31 LAB
OHS CV HOLTER LENGTH DECIMAL HOURS: 48
OHS CV HOLTER LENGTH HOURS: 48
OHS CV HOLTER LENGTH MINUTES: 0

## 2018-09-04 ENCOUNTER — TELEPHONE (OUTPATIENT)
Dept: FAMILY MEDICINE | Facility: CLINIC | Age: 58
End: 2018-09-04

## 2018-09-04 NOTE — TELEPHONE ENCOUNTER
----- Message from Ngozi Mcclendon sent at 9/4/2018  8:53 AM CDT -----  Contact: patient  Returning missed call. Please call back [phone]

## 2018-09-10 ENCOUNTER — TELEPHONE (OUTPATIENT)
Dept: FAMILY MEDICINE | Facility: CLINIC | Age: 58
End: 2018-09-10

## 2018-09-10 ENCOUNTER — INITIAL CONSULT (OUTPATIENT)
Dept: CARDIOLOGY | Facility: CLINIC | Age: 58
End: 2018-09-10
Payer: COMMERCIAL

## 2018-09-10 VITALS
SYSTOLIC BLOOD PRESSURE: 158 MMHG | DIASTOLIC BLOOD PRESSURE: 92 MMHG | HEART RATE: 86 BPM | BODY MASS INDEX: 19.93 KG/M2 | HEIGHT: 67 IN | WEIGHT: 127 LBS

## 2018-09-10 DIAGNOSIS — R42 DIZZINESS AND GIDDINESS: ICD-10-CM

## 2018-09-10 DIAGNOSIS — R55 SYNCOPE AND COLLAPSE: Primary | ICD-10-CM

## 2018-09-10 DIAGNOSIS — R00.2 PALPITATIONS: ICD-10-CM

## 2018-09-10 DIAGNOSIS — I49.8 OTHER SPECIFIED CARDIAC ARRHYTHMIAS: ICD-10-CM

## 2018-09-10 DIAGNOSIS — I47.29 NSVT (NONSUSTAINED VENTRICULAR TACHYCARDIA): ICD-10-CM

## 2018-09-10 DIAGNOSIS — F41.9 ANXIETY: Chronic | ICD-10-CM

## 2018-09-10 PROCEDURE — 93005 ELECTROCARDIOGRAM TRACING: CPT | Mod: S$GLB,,, | Performed by: INTERNAL MEDICINE

## 2018-09-10 PROCEDURE — 93010 ELECTROCARDIOGRAM REPORT: CPT | Mod: S$GLB,,, | Performed by: INTERNAL MEDICINE

## 2018-09-10 PROCEDURE — 99999 PR PBB SHADOW E&M-EST. PATIENT-LVL III: CPT | Mod: PBBFAC,,, | Performed by: INTERNAL MEDICINE

## 2018-09-10 PROCEDURE — 99244 OFF/OP CNSLTJ NEW/EST MOD 40: CPT | Mod: S$GLB,,, | Performed by: INTERNAL MEDICINE
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
## 2018-09-10 NOTE — LETTER
September 10, 2018      Elisha Street MD  [address] [facility] [address] [place] [address]           [facility]  [address] [facility] [address] [place] [address]  Phone: [phone]          Patient: Mely Viera   MR Number: [id number]   YOB: 1960   Date of Visit: 9/10/2018       Dear Dr. Elisha Street:    Thank you for referring Mely Viera to me for evaluation. Attached you will find relevant portions of my assessment and plan of care.    If you have questions, please do not hesitate to call me. I look forward to following Mely Viera along with you.    Sincerely,    Juan David Serna MD    Enclosure  CC:  No Recipients    If you would like to receive this communication electronically, please contact [email or web address] or [phone] to request more information on [facility] Link access.    For providers and/or their staff who would like to refer a patient to [facility], please contact us through our one-stop-shop provider referral line, [facility], at [phone].    If you feel you have received this communication in error or would no longer like to receive these types of communications, please e-mail [email or web address]

## 2018-09-10 NOTE — TELEPHONE ENCOUNTER
----- Message from Nelda Moya sent at 9/10/2018  2:04 PM CDT -----  Contact: Mely  Type: Needs Medical Advice    Who Called:  patient  Best Call Back Number: [phone]  Additional Information: Asking if needs to come in for visit or if can resolve over phone as seen cardiologist today and no heart issue but sleep issue. Checking what would be next step in plan of treatment. Thanks!

## 2018-09-11 ENCOUNTER — OFFICE VISIT (OUTPATIENT)
Dept: FAMILY MEDICINE | Facility: CLINIC | Age: 58
End: 2018-09-11
Payer: COMMERCIAL

## 2018-09-11 VITALS
DIASTOLIC BLOOD PRESSURE: 78 MMHG | HEIGHT: 67 IN | WEIGHT: 127.88 LBS | BODY MASS INDEX: 20.07 KG/M2 | OXYGEN SATURATION: 99 % | SYSTOLIC BLOOD PRESSURE: 106 MMHG | TEMPERATURE: 98 F

## 2018-09-11 DIAGNOSIS — G47.09 OTHER INSOMNIA: ICD-10-CM

## 2018-09-11 DIAGNOSIS — F41.9 ANXIETY: Primary | ICD-10-CM

## 2018-09-11 PROCEDURE — 99213 OFFICE O/P EST LOW 20 MIN: CPT | Mod: S$GLB,,, | Performed by: FAMILY MEDICINE

## 2018-09-11 PROCEDURE — 99999 PR PBB SHADOW E&M-EST. PATIENT-LVL III: CPT | Mod: PBBFAC,,, | Performed by: FAMILY MEDICINE

## 2018-09-11 PROCEDURE — 3008F BODY MASS INDEX DOCD: CPT | Mod: CPTII,S$GLB,, | Performed by: FAMILY MEDICINE

## 2018-09-11 RX ORDER — ALPRAZOLAM 0.25 MG/1
0.25 TABLET ORAL NIGHTLY PRN
Qty: 30 TABLET | Refills: 0 | Status: SHIPPED | OUTPATIENT
Start: 2018-09-11 | End: 2020-05-08 | Stop reason: SDUPTHER

## 2018-09-11 NOTE — PATIENT INSTRUCTIONS
Ventricular Arrhythmia  The heart has its own electrical system to regulate the heartbeat. An abnormal change in the rate or pattern of the heartbeat is called an arrhythmia. It can cause the heart to move blood less efficiently. Four chambers hold blood as it moves through the heart. The 2 lower chambers of the heart are called ventricles. Problems with the signals in the heart can make the ventricles beat faster than normal.    Ventricular tachycardia (VT)  Rapid electrical from the ventricles can result in a fast heart rhythm called ventricular tachycardia or VT.  · With VT, abnormal electrical pathways or circuits form in the ventricles. This can be caused by any disease that damages the heart muscle. It's most commonly seen as a result of a heart attack or coronary artery disease.  · Electrical signals enter the abnormal circuit and loop around. With each loop, the signal tells the ventricles to contract. This makes the heart beat very fast. The heart may be beating too fast to pump blood. This can cause you to pass out. It can also cause cardiac arrest, leading to sudden death.   · In some cases, VT develops into ventricular fibrillation. This is the most serious type of arrhythmia as it is fatal if not promptly treated.    Ventricular fibrillation (VF)  At times, electrical signals may be sent so fast and unevenly that the heart muscle quivers and doesnt beat at all. This is called fibrillation:  · VF can occur if the ventricles contain several abnormal circuits, or if the heart muscle is acutely injured such as from a heart attack, and becomes electrically unstable.  · Signals caught in the circuits make the ventricles beat very quickly and unevenly. This keeps the heart muscle from pumping properly.  · The heart can get to the point that it cant pump at all. This is called cardiac arrest. Death will occur if emergency treatment isnt given to return the heart rhythm to normal.  Date Last Reviewed:  4/20/2016  © 9873-4887 [employer]. [address] [place] [address], [place] [address]. All rights reserved. This information is not intended as a substitute for professional medical care. Always follow your healthcare professional's instructions.

## 2018-09-11 NOTE — PROGRESS NOTES
Subjective:       Patient ID: Mely Viera is a 57 y.o. female.    Chief Complaint: Insomnia (cardiologist states root of problem with heart is not sleeping) and trazodone (patient dc'd; caused nausea and fatigue)    The patient went to see the cardiologist secondary to a run of nonsustained ventricular tachycardia rhythm in the Holter monitor, no medication was prescribed, was told that her not sleeping was causing severe issues, the patient wants a referral to see the sleep specialist.  In the past she went to do sleep studies but because of the patient cannot sleep at night those were inconclusive.      Anxiety   Presents for follow-up visit. Symptoms include dizziness, excessive worry, insomnia, irritability, malaise, muscle tension, nervous/anxious behavior, palpitations and restlessness. Patient reports no chest pain, compulsions, confusion, decreased concentration, depressed mood, dry mouth, feeling of choking, hyperventilation, impotence, nausea, obsessions, panic, shortness of breath or suicidal ideas. Symptoms occur most days. The severity of symptoms is interfering with daily activities. The quality of sleep is poor. Nighttime awakenings: several.     Compliance with medications is 26-50%. Treatment side effects: The patient was taking trazodone which caused fatigue and weakness, she stop taking this medication.        Review of Systems   Constitutional: Positive for fatigue and irritability. Negative for chills.   HENT: Negative for congestion and dental problem.    Respiratory: Negative for shortness of breath.    Cardiovascular: Positive for palpitations. Negative for chest pain.   Gastrointestinal: Negative for nausea.   Genitourinary: Negative for dyspareunia, dysuria and impotence.   Neurological: Positive for dizziness. Negative for facial asymmetry.   Psychiatric/Behavioral: Positive for agitation and sleep disturbance. Negative for confusion, decreased concentration and suicidal ideas. The  patient is nervous/anxious and has insomnia.        Objective:      Physical Exam   Constitutional: She appears well-developed and well-nourished. No distress.   HENT:   Head: Normocephalic and atraumatic.   Right Ear: External ear normal.   Left Ear: External ear normal.   Musculoskeletal: She exhibits no tenderness or deformity.   Skin: She is not diaphoretic.   Psychiatric: She has a normal mood and affect. Her behavior is normal. Judgment and thought content normal.   Nursing note and vitals reviewed.      Assessment:       1. Anxiety    2. Other insomnia        Plan:       Anxiety:  Worsening    Other insomnia:  Worsening  -     Ambulatory consult to Sleep Disorders    Other orders  -     ALPRAZolam (XANAX) 0.25 MG tablet; Take 1 tablet (0.25 mg total) by mouth nightly as needed for Insomnia or Anxiety.  Dispense: 30 tablet; Refill: 0    The patient was advised to take Xanax every night for the next 4 weeks, will also consult with the sleep specialist.  Continue healthy eating habits, drink plenty water, increase physical activity as tolerated.Patient agreed with assessment and plan. Patient verbalized understanding.

## 2018-09-14 DIAGNOSIS — R00.2 PALPITATIONS: Primary | ICD-10-CM

## 2018-09-14 DIAGNOSIS — I49.8 OTHER SPECIFIED CARDIAC ARRHYTHMIAS: Primary | ICD-10-CM

## 2019-02-13 ENCOUNTER — TELEPHONE (OUTPATIENT)
Dept: ELECTROPHYSIOLOGY | Facility: CLINIC | Age: 59
End: 2019-02-13

## 2019-02-13 NOTE — TELEPHONE ENCOUNTER
Returned call to Pt. She is having growths removed from her eye lid and needs clearance for anesthesia.  Advised I would speak with Dr Serna and get back with her. Pt voiced understanding.        ----- Message from Ana Mckay MA sent at 2/13/2019  2:05 PM CST -----  Contact: Patient      ----- Message -----  From: Jaime Nowak  Sent: 2/13/2019   2:02 PM  To: [facility] Fall Staff    Type: Needs Medical Advice    Who Called:  Patient  Best Call Back Number: [phone]  Additional Information: Patient would like to know if they can be put under Anesthesia. Please call to advise. Thanks!

## 2019-02-15 NOTE — TELEPHONE ENCOUNTER
Discussed message with Dr Serna. He states from an arrhythmia point of view, she is prn. He is fine with her proceeding but general clearance will need to come from her primary care. Spoke with patient and relayed recommendations. She verbalized understanding.

## 2019-02-18 ENCOUNTER — TELEPHONE (OUTPATIENT)
Dept: FAMILY MEDICINE | Facility: CLINIC | Age: 59
End: 2019-02-18

## 2019-02-18 NOTE — TELEPHONE ENCOUNTER
----- Message from Bernarda Walker sent at 2/18/2019  2:08 PM CST -----  Contact: pt [phone]  Patient called and asked if you will be able to sign off for her to be put under anesthesia. Patient is asking for a call back to give you more details .

## 2019-02-18 NOTE — TELEPHONE ENCOUNTER
Because appointment is been more than 3 months, I will have to see the patient, can you please schedule the patient an appointment for surgical clearance.  Thank you

## 2019-02-18 NOTE — TELEPHONE ENCOUNTER
Spoke to pt. Pt states she is having a procedure on her eyes and is wondering if Dr. Street will clear her for general anesthesia. Please advise.

## 2019-02-22 ENCOUNTER — OFFICE VISIT (OUTPATIENT)
Dept: FAMILY MEDICINE | Facility: CLINIC | Age: 59
End: 2019-02-22
Payer: COMMERCIAL

## 2019-02-22 VITALS
OXYGEN SATURATION: 99 % | WEIGHT: 127.44 LBS | HEART RATE: 77 BPM | BODY MASS INDEX: 19.96 KG/M2 | SYSTOLIC BLOOD PRESSURE: 118 MMHG | DIASTOLIC BLOOD PRESSURE: 70 MMHG

## 2019-02-22 DIAGNOSIS — Z86.79 HISTORY OF CARDIAC ARRHYTHMIA: Primary | ICD-10-CM

## 2019-02-22 DIAGNOSIS — Z01.818 PREOPERATIVE CLEARANCE: ICD-10-CM

## 2019-02-22 DIAGNOSIS — G47.09 OTHER INSOMNIA: ICD-10-CM

## 2019-02-22 DIAGNOSIS — Z11.59 NEED FOR HEPATITIS C SCREENING TEST: ICD-10-CM

## 2019-02-22 DIAGNOSIS — Z12.11 SCREENING FOR COLON CANCER: ICD-10-CM

## 2019-02-22 DIAGNOSIS — R21 RASH AND NONSPECIFIC SKIN ERUPTION: ICD-10-CM

## 2019-02-22 DIAGNOSIS — Z13.6 SCREENING FOR CARDIOVASCULAR CONDITION: ICD-10-CM

## 2019-02-22 PROCEDURE — 3008F PR BODY MASS INDEX (BMI) DOCUMENTED: ICD-10-PCS | Mod: CPTII,S$GLB,, | Performed by: FAMILY MEDICINE

## 2019-02-22 PROCEDURE — 99999 PR PBB SHADOW E&M-EST. PATIENT-LVL IV: ICD-10-PCS | Mod: PBBFAC,,, | Performed by: FAMILY MEDICINE

## 2019-02-22 PROCEDURE — 99214 PR OFFICE/OUTPT VISIT, EST, LEVL IV, 30-39 MIN: ICD-10-PCS | Mod: S$GLB,,, | Performed by: FAMILY MEDICINE

## 2019-02-22 PROCEDURE — 99214 OFFICE O/P EST MOD 30 MIN: CPT | Mod: S$GLB,,, | Performed by: FAMILY MEDICINE

## 2019-02-22 PROCEDURE — 99999 PR PBB SHADOW E&M-EST. PATIENT-LVL IV: CPT | Mod: PBBFAC,,, | Performed by: FAMILY MEDICINE

## 2019-02-22 PROCEDURE — 3008F BODY MASS INDEX DOCD: CPT | Mod: CPTII,S$GLB,, | Performed by: FAMILY MEDICINE

## 2019-02-22 RX ORDER — HYDROCORTISONE 25 MG/G
OINTMENT TOPICAL
COMMUNITY
Start: 2019-01-16 | End: 2019-07-10

## 2019-02-25 NOTE — PROGRESS NOTES
Subjective:       Patient ID: Mely Viera is a 58 y.o. female.    Chief Complaint: Follow-up, clearance for surgery    Rash   This is a new problem. The current episode started 1 to 4 weeks ago. The problem has been gradually worsening since onset. Location: face. The rash is characterized by dryness, redness, peeling and itchiness. She was exposed to nothing. Associated symptoms include fatigue. Pertinent negatives include no anorexia, congestion, cough, diarrhea, fever, joint pain, rhinorrhea, shortness of breath or sore throat. Past treatments include nothing. The treatment provided no relief. There is no history of asthma.        The patient is coming here today for work clearance for surgery, she will have a cosmetic surgery on the lower lids, because of the previous history of cardiac arrhythmia, the patient is coming here for clearance.  The patient is not taking any medications at this time for her arrhythmia.  She still complaining of insomnia, she is been trying multiple medications for sleep being which nothing seemed to help, she is taking Xanax 0.25 mg half tablet as needed which seemed to help slightly.  She is currently seen the sleep specialist.    She still complains sometimes of fatigue and dizziness, she denies any symptoms of chest pain, shortness of breath, nausea vomiting.    Past medical history, past social history was reviewed and discussed with the patient.    Review of Systems   Constitutional: Positive for fatigue. Negative for activity change, appetite change and fever.   HENT: Negative for congestion, rhinorrhea and sore throat.    Respiratory: Negative for apnea, cough, chest tightness and shortness of breath.    Cardiovascular: Negative for chest pain and leg swelling.   Gastrointestinal: Negative for anorexia and diarrhea.   Musculoskeletal: Negative for joint pain.   Skin: Positive for color change and rash.   Psychiatric/Behavioral: Positive for dysphoric mood and sleep  disturbance. The patient is nervous/anxious.        Objective:      Physical Exam   Constitutional: She appears well-developed and well-nourished. No distress.   HENT:   Head: Normocephalic and atraumatic.   Right Ear: External ear normal.   Left Ear: External ear normal.   Nose: Nose normal.   Mouth/Throat: Oropharynx is clear and moist.   Eyes: Conjunctivae are normal. Pupils are equal, round, and reactive to light. Right eye exhibits no discharge. Left eye exhibits no discharge.   Neck: Neck supple. No thyromegaly present.   Cardiovascular: Normal rate, regular rhythm and normal heart sounds.   No murmur heard.  Pulmonary/Chest: Effort normal and breath sounds normal. No respiratory distress. She has no wheezes.   Abdominal: She exhibits no distension.   Musculoskeletal: She exhibits no tenderness or deformity.   Neurological: No cranial nerve deficit. Coordination normal.   Skin: She is not diaphoretic. No erythema. No pallor.   Psychiatric: She has a normal mood and affect. Her behavior is normal. Judgment and thought content normal.   Nursing note and vitals reviewed.      Assessment:       1. History of cardiac arrhythmia    2. Preoperative clearance    3. Need for hepatitis C screening test    4. Screening for cardiovascular condition    5. Other insomnia    6. Screening for colon cancer    7. Rash and nonspecific skin eruption        Plan:       History of cardiac arrhythmia:  Because of the history of arrhythmia, will refer the patient to the cardiologist for cardiac clearance.  -     Ambulatory referral to Cardiology    Preoperative clearance:  New problem, workup needed  -     Ambulatory referral to Cardiology  -     CBC auto differential; Future; Expected date: 02/22/2019  -     Comprehensive metabolic panel; Future; Expected date: 02/22/2019    Need for hepatitis C screening test  -     Hepatitis C antibody; Future; Expected date: 02/22/2019    Screening for cardiovascular condition  -     Lipid panel;  Future; Expected date: 02/22/2019    Other insomnia:  Worsening    Screening for colon cancer  -     Fecal Immunochemical Test (iFOBT); Future; Expected date: 02/22/2019    Rash and nonspecific skin eruption:  New problem, workup needed  -     Ambulatory referral to Dermatology    Because of worsening symptoms of the rash, will refer the patient to the dermatologist for further assessment, will continue with Xanax, the patient was advised to take 1 tablet at bedtime.  She will contact us back when she needs a new prescription.  The patient was advised to follow with the cardiologist secondary to the history of cardiac arrhythmia and not taking any medications at this time.  Will call the patient after we have the results of the test.  Continue with healthy eating habits, continue exercising.  I spent 30 min in this encounter, from this time more than 50% of the time was spent in counseling and plan of care for this patient.  Patient agreed with assessment and plan. Patient verbalized understanding.

## 2019-03-04 ENCOUNTER — LAB VISIT (OUTPATIENT)
Dept: LAB | Facility: HOSPITAL | Age: 59
End: 2019-03-04
Attending: FAMILY MEDICINE
Payer: COMMERCIAL

## 2019-03-04 DIAGNOSIS — Z12.11 SCREENING FOR COLON CANCER: ICD-10-CM

## 2019-03-04 LAB — HEMOCCULT STL QL IA: NEGATIVE

## 2019-03-04 PROCEDURE — 82274 ASSAY TEST FOR BLOOD FECAL: CPT

## 2019-03-08 ENCOUNTER — TELEPHONE (OUTPATIENT)
Dept: FAMILY MEDICINE | Facility: CLINIC | Age: 59
End: 2019-03-08

## 2019-03-08 NOTE — TELEPHONE ENCOUNTER
Spoke to pt. Pt states she woke up with hives on her face and neck this morning, states that she started a new medication, nasacort, yesterday and doesn't know if it is from that or not. Pt states she took benadryl about 1 hour ago and it has not helped at all. Advised pt to go to an  clinic to be evaluated. Pt verbalized understanding

## 2019-03-08 NOTE — TELEPHONE ENCOUNTER
----- Message from Hemalatha Villalba sent at 3/8/2019 10:17 AM CST -----  Type: Needs Medical Advice    Who Called:  Patient   Symptoms (please be specific):  Hives on face and neck  How long has patient had these symptoms:  Woke up with them this morning  Pharmacy name and phone #:   Best Call Back Number: [phone]  Additional Information: took Nasacort yesterday, new to her, contact to advise

## 2019-07-10 ENCOUNTER — HOSPITAL ENCOUNTER (OUTPATIENT)
Dept: RADIOLOGY | Facility: HOSPITAL | Age: 59
Discharge: HOME | End: 2019-07-10
Attending: OBSTETRICS & GYNECOLOGY
Payer: COMMERCIAL

## 2019-07-10 ENCOUNTER — OFFICE VISIT (OUTPATIENT)
Dept: OBSTETRICS AND GYNECOLOGY | Facility: CLINIC | Age: 59
End: 2019-07-10
Payer: COMMERCIAL

## 2019-07-10 VITALS
BODY MASS INDEX: 18.81 KG/M2 | DIASTOLIC BLOOD PRESSURE: 64 MMHG | WEIGHT: 124.13 LBS | SYSTOLIC BLOOD PRESSURE: 110 MMHG | HEIGHT: 68 IN

## 2019-07-10 VITALS — HEIGHT: 68 IN | WEIGHT: 124 LBS | BODY MASS INDEX: 18.79 KG/M2

## 2019-07-10 DIAGNOSIS — Z01.419 ENCOUNTER FOR GYNECOLOGICAL EXAMINATION WITHOUT ABNORMAL FINDING: Primary | ICD-10-CM

## 2019-07-10 DIAGNOSIS — G47.9 SLEEP DISTURBANCE: ICD-10-CM

## 2019-07-10 DIAGNOSIS — Z12.31 VISIT FOR SCREENING MAMMOGRAM: ICD-10-CM

## 2019-07-10 DIAGNOSIS — A60.00 GENITAL HERPES SIMPLEX, UNSPECIFIED SITE: ICD-10-CM

## 2019-07-10 DIAGNOSIS — F41.9 ANXIETY: Chronic | ICD-10-CM

## 2019-07-10 PROCEDURE — 99396 PR PREVENTIVE VISIT,EST,40-64: ICD-10-PCS | Mod: S$GLB,,, | Performed by: OBSTETRICS & GYNECOLOGY

## 2019-07-10 PROCEDURE — 77063 BREAST TOMOSYNTHESIS BI: CPT | Mod: 26,,, | Performed by: RADIOLOGY

## 2019-07-10 PROCEDURE — 77067 MAMMO DIGITAL SCREENING BILAT WITH TOMOSYNTHESIS_CAD: ICD-10-PCS | Mod: 26,,, | Performed by: RADIOLOGY

## 2019-07-10 PROCEDURE — 77067 SCR MAMMO BI INCL CAD: CPT | Mod: 26,,, | Performed by: RADIOLOGY

## 2019-07-10 PROCEDURE — 77067 SCR MAMMO BI INCL CAD: CPT | Mod: TC,PN

## 2019-07-10 PROCEDURE — 99999 PR PBB SHADOW E&M-EST. PATIENT-LVL III: ICD-10-PCS | Mod: PBBFAC,,, | Performed by: OBSTETRICS & GYNECOLOGY

## 2019-07-10 PROCEDURE — 99999 PR PBB SHADOW E&M-EST. PATIENT-LVL III: CPT | Mod: PBBFAC,,, | Performed by: OBSTETRICS & GYNECOLOGY

## 2019-07-10 PROCEDURE — 77063 MAMMO DIGITAL SCREENING BILAT WITH TOMOSYNTHESIS_CAD: ICD-10-PCS | Mod: 26,,, | Performed by: RADIOLOGY

## 2019-07-10 PROCEDURE — 99396 PREV VISIT EST AGE 40-64: CPT | Mod: S$GLB,,, | Performed by: OBSTETRICS & GYNECOLOGY

## 2019-07-10 RX ORDER — ALPRAZOLAM 0.25 MG/1
0.25 TABLET ORAL 3 TIMES DAILY PRN
Qty: 30 TABLET | Refills: 5 | Status: SHIPPED | OUTPATIENT
Start: 2019-07-10 | End: 2019-08-22

## 2019-07-10 RX ORDER — VALACYCLOVIR HYDROCHLORIDE 500 MG/1
500 TABLET, FILM COATED ORAL DAILY
Qty: 30 TABLET | Refills: 11 | Status: SHIPPED | OUTPATIENT
Start: 2019-07-10 | End: 2021-07-12 | Stop reason: SDUPTHER

## 2019-07-10 NOTE — PROGRESS NOTES
Chief Complaint   Patient presents with    vaginal cyst has resolved    overdue for wwe    Mammogram       History of Present Illness: Mely Viera is a 58 y.o. female that presents today 7/10/2019 for well gyn visit.  She reports that she has had trouble with sleeping. She reports heat stroke and syncopal episode. She reports that she is using xanax 0.25 mg nightly for sleep.      Past Medical History:   Diagnosis Date    Anxiety     Depression     Heat stroke     HSV (herpes simplex virus) infection     Sleep disturbance        Past Surgical History:   Procedure Laterality Date    NONE         Current Outpatient Medications   Medication Sig Dispense Refill    ALPRAZolam (XANAX) 0.25 MG tablet Take 1 tablet (0.25 mg total) by mouth nightly as needed for Insomnia or Anxiety. 30 tablet 0    naproxen (NAPROSYN) 500 MG tablet Take one tablet PO BID with meals as needed for headache 20 tablet 0    valACYclovir (VALTREX) 500 MG tablet Take 1 tablet (500 mg total) by mouth once daily. 30 tablet 11     No current facility-administered medications for this visit.        Review of patient's allergies indicates:   Allergen Reactions    Temazepam Swelling    Egg      Other reaction(s): Hives    Escitalopram      Other reaction(s): did not like effects    Shrimp        Family History   Problem Relation Age of Onset    Diabetes Sister     Hypertension Sister     Crohn's disease Sister     Breast cancer Maternal Aunt     Ovarian cancer Paternal Cousin     COPD Neg Hx        Social History     Socioeconomic History    Marital status: Single     Spouse name: Not on file    Number of children: 1    Years of education: Not on file    Highest education level: Not on file   Occupational History    Occupation: marketing     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   Social Needs    Financial resource strain: Not on file    Food insecurity:     Worry: Not on file     Inability: Not on file    Transportation  "needs:     Medical: Not on file     Non-medical: Not on file   Tobacco Use    Smoking status: Never Smoker    Smokeless tobacco: Never Used   Substance and Sexual Activity    Alcohol use: Yes     Alcohol/week: 1.2 oz     Types: 2 Glasses of wine per week     Comment: less than once a week    Drug use: No    Sexual activity: Not Currently     Partners: Male     Birth control/protection: None, Post-menopausal   Lifestyle    Physical activity:     Days per week: Not on file     Minutes per session: Not on file    Stress: Not on file   Relationships    Social connections:     Talks on phone: Not on file     Gets together: Not on file     Attends Hoahaoism service: Not on file     Active member of club or organization: Not on file     Attends meetings of clubs or organizations: Not on file     Relationship status: Not on file   Other Topics Concern    Not on file   Social History Narrative    Not on file       OB History    Para Term  AB Living   1 1 1     1   SAB TAB Ectopic Multiple Live Births           1      # Outcome Date GA Lbr Ruddy/2nd Weight Sex Delivery Anes PTL Lv   1 Term 10/09/13   3.289 kg (7 lb 4 oz) F Vag-Spont EPI N DARON       Review of Symptoms:  GENERAL: Denies weight gain or weight loss. Feeling well overall.   SKIN: Denies rash or lesions.   HEAD: Denies head injury or headache.   NODES: Denies enlarged lymph nodes.   CHEST: Denies chest pain or shortness of breath.   CARDIOVASCULAR: Denies palpitations or left sided chest pain.   ABDOMEN: No abdominal pain, constipation, diarrhea, nausea, vomiting or rectal bleeding.   URINARY: No frequency, dysuria, hematuria, or burning on urination.  HEMATOLOGIC: No easy bruisability or excessive bleeding.   MUSCULOSKELETAL: Denies joint pain or swelling.     /64   Ht 5' 7.5" (1.715 m)   Wt 56.3 kg (124 lb 1.9 oz)   LMP 2014   Physical Exam:  APPEARANCE: Well nourished, well developed, in no acute distress.  SKIN: Normal skin " turgor, no lesions.  NECK: Neck symmetric without masses   RESPIRATORY: Normal respiratory effort with no retractions or use of accessory muscles  CARDIOVASCULAR: Peripheral vascular system with no swelling no varicosities and palpation of pulses normal  LYMPHATIC: No enlargements of the lymph nodes noted in the neck, axillae, or groin  ABDOMEN: Soft. No tenderness or masses. No hepatosplenomegaly. No hernias.  BREASTS: Symmetrical, no skin changes or visible lesions. No palpable masses, nipple discharge or adenopathy bilaterally.  PELVIC: Normal external female genitalia without lesions. Normal hair distribution. Adequate perineal body, normal urethral meatus. Urethra with no masses.  Bladder nontender. Vagina moist and well rugated without lesions or discharge. Cervix pink and without lesions. No significant cystocele or rectocele. Bimanual exam showed uterus normal size, shape, position, mobile and nontender. Adnexa without masses or tenderness. Urethra and bladder normal.   EXTREMITIES: No clubbing cyanosis or edema.    ASSESSMENT/PLAN:  Encounter for gynecological examination without abnormal finding    Visit for screening mammogram  -     Cancel: Mammo Digital Screening Bilat With CAD; Future; Expected date: 07/10/2019    Genital herpes simplex, unspecified site  -     valACYclovir (VALTREX) 500 MG tablet; Take 1 tablet (500 mg total) by mouth once daily.  Dispense: 30 tablet; Refill: 11    Anxiety    Sleep disturbance  -     Ambulatory consult to Neurology          Patient was counseled today on Pelvic exams and Pap Smear guidelines.   We discussed STD screening if at high risk for a STD.  We discussed recommendation for breast cancer screening with mammogram every other year after the age of 40 and annually after the age of 50.    We discussed colon cancer screening when indicated.   Osteoporosis screening discussed when indicated.   She was advised to see her primary care physician for all other health  maintenance.     FOLLOW-UP with me for next routine visit.

## 2019-08-22 ENCOUNTER — OFFICE VISIT (OUTPATIENT)
Dept: FAMILY MEDICINE | Facility: CLINIC | Age: 59
End: 2019-08-22
Payer: COMMERCIAL

## 2019-08-22 ENCOUNTER — LAB VISIT (OUTPATIENT)
Dept: LAB | Facility: HOSPITAL | Age: 59
End: 2019-08-22
Attending: FAMILY MEDICINE
Payer: COMMERCIAL

## 2019-08-22 VITALS
HEART RATE: 81 BPM | OXYGEN SATURATION: 98 % | DIASTOLIC BLOOD PRESSURE: 78 MMHG | SYSTOLIC BLOOD PRESSURE: 116 MMHG | WEIGHT: 124.75 LBS | BODY MASS INDEX: 19.25 KG/M2

## 2019-08-22 DIAGNOSIS — F41.9 ANXIETY: ICD-10-CM

## 2019-08-22 DIAGNOSIS — R00.2 PALPITATIONS: ICD-10-CM

## 2019-08-22 DIAGNOSIS — Z11.59 ENCOUNTER FOR HEPATITIS C SCREENING TEST FOR LOW RISK PATIENT: ICD-10-CM

## 2019-08-22 DIAGNOSIS — R53.83 OTHER FATIGUE: ICD-10-CM

## 2019-08-22 DIAGNOSIS — Z86.79 H/O CARDIAC ARRHYTHMIA: ICD-10-CM

## 2019-08-22 DIAGNOSIS — Z13.6 SCREENING FOR CARDIOVASCULAR CONDITION: ICD-10-CM

## 2019-08-22 DIAGNOSIS — G47.09 OTHER INSOMNIA: Primary | ICD-10-CM

## 2019-08-22 LAB
ALBUMIN SERPL BCP-MCNC: 4.1 G/DL (ref 3.5–5.2)
ALP SERPL-CCNC: 74 U/L (ref 55–135)
ALT SERPL W/O P-5'-P-CCNC: 16 U/L (ref 10–44)
ANION GAP SERPL CALC-SCNC: 7 MMOL/L (ref 8–16)
AST SERPL-CCNC: 19 U/L (ref 10–40)
BASOPHILS # BLD AUTO: 0.04 K/UL (ref 0–0.2)
BASOPHILS NFR BLD: 0.9 % (ref 0–1.9)
BILIRUB SERPL-MCNC: 0.5 MG/DL (ref 0.1–1)
BUN SERPL-MCNC: 14 MG/DL (ref 6–20)
CALCIUM SERPL-MCNC: 9.4 MG/DL (ref 8.7–10.5)
CHLORIDE SERPL-SCNC: 107 MMOL/L (ref 95–110)
CHOLEST SERPL-MCNC: 180 MG/DL (ref 120–199)
CHOLEST/HDLC SERPL: 4.1 {RATIO} (ref 2–5)
CO2 SERPL-SCNC: 29 MMOL/L (ref 23–29)
CREAT SERPL-MCNC: 0.9 MG/DL (ref 0.5–1.4)
DIFFERENTIAL METHOD: ABNORMAL
EOSINOPHIL # BLD AUTO: 0.1 K/UL (ref 0–0.5)
EOSINOPHIL NFR BLD: 1.8 % (ref 0–8)
ERYTHROCYTE [DISTWIDTH] IN BLOOD BY AUTOMATED COUNT: 13.1 % (ref 11.5–14.5)
EST. GFR  (AFRICAN AMERICAN): >60 ML/MIN/1.73 M^2
EST. GFR  (NON AFRICAN AMERICAN): >60 ML/MIN/1.73 M^2
GLUCOSE SERPL-MCNC: 99 MG/DL (ref 70–110)
HCT VFR BLD AUTO: 40.7 % (ref 37–48.5)
HDLC SERPL-MCNC: 44 MG/DL (ref 40–75)
HDLC SERPL: 24.4 % (ref 20–50)
HGB BLD-MCNC: 13.1 G/DL (ref 12–16)
IMM GRANULOCYTES # BLD AUTO: 0 K/UL (ref 0–0.04)
IMM GRANULOCYTES NFR BLD AUTO: 0 % (ref 0–0.5)
LDLC SERPL CALC-MCNC: 108.8 MG/DL (ref 63–159)
LYMPHOCYTES # BLD AUTO: 1.6 K/UL (ref 1–4.8)
LYMPHOCYTES NFR BLD: 34.2 % (ref 18–48)
MCH RBC QN AUTO: 31.3 PG (ref 27–31)
MCHC RBC AUTO-ENTMCNC: 32.2 G/DL (ref 32–36)
MCV RBC AUTO: 97 FL (ref 82–98)
MONOCYTES # BLD AUTO: 0.4 K/UL (ref 0.3–1)
MONOCYTES NFR BLD: 8.6 % (ref 4–15)
NEUTROPHILS # BLD AUTO: 2.5 K/UL (ref 1.8–7.7)
NEUTROPHILS NFR BLD: 54.5 % (ref 38–73)
NONHDLC SERPL-MCNC: 136 MG/DL
NRBC BLD-RTO: 0 /100 WBC
PLATELET # BLD AUTO: 240 K/UL (ref 150–350)
PMV BLD AUTO: 11 FL (ref 9.2–12.9)
POTASSIUM SERPL-SCNC: 4.2 MMOL/L (ref 3.5–5.1)
PROT SERPL-MCNC: 7.3 G/DL (ref 6–8.4)
RBC # BLD AUTO: 4.19 M/UL (ref 4–5.4)
SODIUM SERPL-SCNC: 143 MMOL/L (ref 136–145)
TRIGL SERPL-MCNC: 136 MG/DL (ref 30–150)
TSH SERPL DL<=0.005 MIU/L-ACNC: 1.22 UIU/ML (ref 0.4–4)
WBC # BLD AUTO: 4.53 K/UL (ref 3.9–12.7)

## 2019-08-22 PROCEDURE — 84443 ASSAY THYROID STIM HORMONE: CPT

## 2019-08-22 PROCEDURE — 80053 COMPREHEN METABOLIC PANEL: CPT

## 2019-08-22 PROCEDURE — 99214 PR OFFICE/OUTPT VISIT, EST, LEVL IV, 30-39 MIN: ICD-10-PCS | Mod: S$GLB,,, | Performed by: FAMILY MEDICINE

## 2019-08-22 PROCEDURE — 3008F BODY MASS INDEX DOCD: CPT | Mod: CPTII,S$GLB,, | Performed by: FAMILY MEDICINE

## 2019-08-22 PROCEDURE — 99214 OFFICE O/P EST MOD 30 MIN: CPT | Mod: S$GLB,,, | Performed by: FAMILY MEDICINE

## 2019-08-22 PROCEDURE — 80061 LIPID PANEL: CPT

## 2019-08-22 PROCEDURE — 86803 HEPATITIS C AB TEST: CPT

## 2019-08-22 PROCEDURE — 99999 PR PBB SHADOW E&M-EST. PATIENT-LVL III: CPT | Mod: PBBFAC,,, | Performed by: FAMILY MEDICINE

## 2019-08-22 PROCEDURE — 3008F PR BODY MASS INDEX (BMI) DOCUMENTED: ICD-10-PCS | Mod: CPTII,S$GLB,, | Performed by: FAMILY MEDICINE

## 2019-08-22 PROCEDURE — 99999 PR PBB SHADOW E&M-EST. PATIENT-LVL III: ICD-10-PCS | Mod: PBBFAC,,, | Performed by: FAMILY MEDICINE

## 2019-08-22 PROCEDURE — 36415 COLL VENOUS BLD VENIPUNCTURE: CPT | Mod: PO

## 2019-08-22 PROCEDURE — 85025 COMPLETE CBC W/AUTO DIFF WBC: CPT

## 2019-08-22 RX ORDER — TRIAZOLAM 0.12 MG/1
0.12 TABLET ORAL NIGHTLY PRN
Qty: 30 TABLET | Refills: 2 | Status: SHIPPED | OUTPATIENT
Start: 2019-08-22 | End: 2019-09-21

## 2019-08-22 NOTE — PATIENT INSTRUCTIONS
Eating Heart-Healthy Food: Using the DASH Plan    Eating for your heart doesnt have to be hard or boring. You just need to know how to make healthier choices. The DASH eating plan has been developed to help you do just that. DASH stands for Dietary Approaches to Stop Hypertension. It is a plan that has been proven to be healthier for your heart and to lower your risk for high blood pressure. It can also help lower your risk for cancer, heart disease, osteoporosis, and diabetes.  Choosing from each food group  Choose foods from each of the food groups below each day. Try to get the recommended number of servings for each food group. The serving numbers are based on a diet of 2,000 calories a day. Talk to your doctor if youre unsure about your calorie needs. Along with getting the correct servings, the DASH plan also recommends a sodium intake less than 2,300 mg per day.        Grains  Servings: 6 to 8 a day  A serving is:  · 1 slice bread  · 1 ounce dry cereal  · Half a cup cooked rice, pasta or cereal  Best choices: Whole grains and any grains high in fiber. Vegetables  Servings: 4 to 5 a day  A serving is:  · 1 cup raw leafy vegetable  · Half a cup cut-up raw or cooked vegetable  · Half a cup vegetable juice  Best choices: Fresh or frozen vegetables prepared without added salt or fat.   Fruits  Servings: 4 to 5 a day  A serving is:  · 1 medium fruit  · One-quarter cup dried fruit  · Half a cup fresh, frozen, or canned fruit  · Half a cup of 100% fruit juices  Best choices: A variety of fresh fruits of different colors. Whole fruits are a better choice than fruit juices. Low-fat or fat-free dairy  Servings: 2 to 3 a day  A serving is:  · 1 cup milk  · 1 cup yogurt  · One and a half ounces cheese  Best choices: Skim or 1% milk, low-fat or fat-free yogurt or buttermilk, and low-fat cheeses.         Lean meats, poultry, fish  Servings: 6 or fewer a day  A serving is:  · 1 ounce cooked meats, poultry, or fish  · 1  egg  Best choices: Lean poultry and fish. Trim away visible fat. Broil, grill, roast, or boil instead of frying. Remove skin from poultry before eating. Limit how much red meat you eat.  Nuts, seeds, beans  Servings: 4 to 5 a week  A serving is:  · One-third cup nuts (one and a half ounces)  · 2 tablespoons nut butter or seeds  · Half a cup cooked dry beans or legumes  Best choices: Dry roasted nuts with no salt added, lentils, kidney beans, garbanzo beans, and whole mccracken beans.   Fats and oils  Servings: 2 to 3 a day  A serving is:  · 1 teaspoon vegetable oil  · 1 teaspoon soft margarine  · 1 tablespoon mayonnaise  · 2 tablespoons salad dressing  Best choices: Nut and vegetable oils (nontropical vegetable oils), such as olive and canola oil. Sweets  Servings: 5 a week or fewer  A serving is:  · 1 tablespoon sugar, maple syrup, or honey  · 1 tablespoon jam or jelly  · 1 half-ounce jelly beans (about 15)  · 1 cup lemonade  Best choices: Dried fruit can be a satisfying sweet. Choose low-fat sweets. And watch your serving sizes!      For more on the DASH eating plan, visit:  www.nhlbi.nih.gov/health/health-topics/topics/dash   Date Last Reviewed: 6/1/2016  © 3270-3151 [employer] [address] [place] [address], [place] [address]. All rights reserved. This information is not intended as a substitute for professional medical care. Always follow your healthcare professional's instructions.

## 2019-08-23 LAB — HCV AB SERPL QL IA: NEGATIVE

## 2019-08-28 NOTE — PROGRESS NOTES
Subjective:       Patient ID: Mely Viera is a 58 y.o. female.    Chief Complaint: Follow-up    The patient complains of insomnia, was prescribed g align by the sleeping specialist, stated the medication works and she would like to have a new refill, the patient stated that she do not take the medication every night just only as needed.    Fatigue   This is a chronic problem. The current episode started more than 1 month ago. The problem occurs constantly. The problem has been gradually worsening. Associated symptoms include fatigue and weakness. Pertinent negatives include no abdominal pain, arthralgias, change in bowel habit, chest pain, chills, congestion, coughing, diaphoresis, fever, headaches, joint swelling, myalgias, nausea, neck pain, numbness, rash, sore throat, swollen glands, urinary symptoms, vertigo, visual change or vomiting. Nothing aggravates the symptoms. She has tried nothing for the symptoms. The treatment provided no relief.   Anxiety   Presents for follow-up visit. Symptoms include excessive worry, insomnia, irritability, nervous/anxious behavior, palpitations and panic. Patient reports no chest pain, compulsions, confusion, decreased concentration, dizziness, feeling of choking, malaise, muscle tension, nausea, restlessness, shortness of breath or suicidal ideas. Symptoms occur most days. The severity of symptoms is moderate. The quality of sleep is poor. Nighttime awakenings: one to two, several.     Her past medical history is significant for anxiety/panic attacks. There is no history of anemia or hyperthyroidism. Compliance with medications is 51-75%.   Palpitations    This is a chronic problem. The current episode started more than 1 month ago. The problem occurs intermittently. The problem has been gradually worsening. Nothing aggravates the symptoms. Associated symptoms include anxiety, an irregular heartbeat, malaise/fatigue and weakness. Pertinent negatives include no chest pain,  coughing, diaphoresis, dizziness, fever, nausea, numbness, shortness of breath or vomiting. She has tried nothing for the symptoms. The treatment provided no relief. Risk factors include family history. Her past medical history is significant for anxiety. There is no history of anemia or hyperthyroidism.      Past medical history, past social history was reviewed and discussed with the patient.    Review of Systems   Constitutional: Positive for fatigue, irritability and malaise/fatigue. Negative for activity change, appetite change, chills, diaphoresis and fever.   HENT: Negative for congestion, ear discharge and sore throat.    Eyes: Negative for discharge and itching.   Respiratory: Negative for cough, choking, chest tightness and shortness of breath.    Cardiovascular: Positive for palpitations. Negative for chest pain and leg swelling.   Gastrointestinal: Negative for abdominal distention, abdominal pain, change in bowel habit, nausea and vomiting.   Endocrine: Negative for cold intolerance and heat intolerance.   Genitourinary: Negative for dysuria and flank pain.   Musculoskeletal: Negative for arthralgias, back pain, joint swelling, myalgias and neck pain.   Skin: Negative for pallor and rash.   Allergic/Immunologic: Negative for environmental allergies and food allergies.   Neurological: Positive for weakness. Negative for dizziness, vertigo, facial asymmetry, numbness and headaches.   Hematological: Negative for adenopathy. Does not bruise/bleed easily.   Psychiatric/Behavioral: Positive for sleep disturbance. Negative for agitation, confusion, decreased concentration and suicidal ideas. The patient is nervous/anxious and has insomnia.        Objective:      Physical Exam   Constitutional: She appears well-developed and well-nourished. No distress.   HENT:   Head: Normocephalic and atraumatic.   Right Ear: External ear normal.   Left Ear: External ear normal.   Mouth/Throat: Oropharynx is clear and moist.  No oropharyngeal exudate.   Eyes: Conjunctivae are normal. Right eye exhibits no discharge. Left eye exhibits no discharge. No scleral icterus.   Neck: Neck supple. No thyromegaly present.   Cardiovascular: Normal rate, regular rhythm and normal heart sounds.   No murmur heard.  Pulmonary/Chest: Effort normal and breath sounds normal. No respiratory distress. She has no wheezes.   Abdominal: She exhibits no distension. There is no tenderness.   Musculoskeletal: She exhibits no tenderness or deformity.   Neurological: No cranial nerve deficit. Coordination normal.   Skin: No rash noted. She is not diaphoretic. No erythema. No pallor.   Psychiatric: She has a normal mood and affect. Her behavior is normal. Judgment and thought content normal.   Nursing note and vitals reviewed.      Assessment:       1. Other insomnia    2. Anxiety    3. Palpitations    4. Other fatigue    5. Encounter for hepatitis C screening test for low risk patient    6. Screening for cardiovascular condition    7. H/O cardiac arrhythmia        Plan:       Other insomnia:  Improved  -     triazolam (HALCION) 0.125 MG tablet; Take 1 tablet (0.125 mg total) by mouth nightly as needed.  Dispense: 30 tablet; Refill: 2    Anxiety:  Stable  -     triazolam (HALCION) 0.125 MG tablet; Take 1 tablet (0.125 mg total) by mouth nightly as needed.  Dispense: 30 tablet; Refill: 2    Palpitations:  Worsening  -     Comprehensive metabolic panel; Future; Expected date: 08/22/2019  -     CBC auto differential; Future; Expected date: 08/22/2019  -     TSH; Future; Expected date: 08/22/2019  -     Ambulatory referral to Cardiology    Other fatigue:  Worsening  -     Comprehensive metabolic panel; Future; Expected date: 08/22/2019  -     CBC auto differential; Future; Expected date: 08/22/2019  -     TSH; Future; Expected date: 08/22/2019    Encounter for hepatitis C screening test for low risk patient  -     Hepatitis C antibody; Future; Expected date:  08/22/2019    Screening for cardiovascular condition  -     Lipid panel; Future; Expected date: 08/22/2019    H/O cardiac arrhythmia  -     Ambulatory referral to Cardiology    For refer the patient back to the cardiologist because the patient still having worsening palpitation and fatigue.  [place] prescription monitoring program was checked and okay, will refilled triazolam that was prescribed 1st by the sleep specialist physician.  Will call the patient after we have the results of the test.  Continue with healthy habits, continue exercising.  If any worsening symptoms of palpitations, the patient will notify us immediately.  She was recommended to not take Xanax and triazolam at the same time because of the risk of side effects.Patient agreed with assessment and plan. Patient verbalized understanding.

## 2019-11-19 ENCOUNTER — TELEPHONE (OUTPATIENT)
Dept: FAMILY MEDICINE | Facility: CLINIC | Age: 59
End: 2019-11-19

## 2019-11-19 RX ORDER — ESZOPICLONE 2 MG/1
2 TABLET, FILM COATED ORAL NIGHTLY PRN
Qty: 30 TABLET | Refills: 2 | Status: SHIPPED | OUTPATIENT
Start: 2019-11-19 | End: 2021-07-12 | Stop reason: SDUPTHER

## 2019-11-19 NOTE — TELEPHONE ENCOUNTER
----- Message from Rodrigo Owens sent at 11/19/2019  2:48 PM CST -----  Type: Needs Medical Advice    Who Called:  Patient    Pharmacy name and phone #:    [id number] [address] [address] [facility] [id number] [address] [address]  Phone: [phone] Fax: [phone]      Best Call Back Number: [phone]  Additional Information: Patient states that she has had problems with Halycon and would like to try Lunesta  Please call to advise

## 2020-03-27 DIAGNOSIS — Z12.11 COLON CANCER SCREENING: ICD-10-CM

## 2020-05-07 RX ORDER — ALPRAZOLAM 0.25 MG/1
0.25 TABLET ORAL NIGHTLY PRN
Qty: 30 TABLET | Refills: 0 | OUTPATIENT
Start: 2020-05-07 | End: 2021-05-07

## 2020-05-08 ENCOUNTER — PATIENT MESSAGE (OUTPATIENT)
Dept: FAMILY MEDICINE | Facility: CLINIC | Age: 60
End: 2020-05-08

## 2020-05-08 ENCOUNTER — OFFICE VISIT (OUTPATIENT)
Dept: FAMILY MEDICINE | Facility: CLINIC | Age: 60
End: 2020-05-08
Payer: COMMERCIAL

## 2020-05-08 DIAGNOSIS — G47.09 OTHER INSOMNIA: Primary | ICD-10-CM

## 2020-05-08 DIAGNOSIS — F41.9 ANXIETY: Chronic | ICD-10-CM

## 2020-05-08 DIAGNOSIS — Z12.11 SCREENING FOR COLON CANCER: ICD-10-CM

## 2020-05-08 PROCEDURE — 99213 OFFICE O/P EST LOW 20 MIN: CPT | Mod: 95,,, | Performed by: FAMILY MEDICINE

## 2020-05-08 PROCEDURE — 99213 PR OFFICE/OUTPT VISIT, EST, LEVL III, 20-29 MIN: ICD-10-PCS | Mod: 95,,, | Performed by: FAMILY MEDICINE

## 2020-05-08 RX ORDER — ALPRAZOLAM 0.25 MG/1
0.25 TABLET ORAL NIGHTLY PRN
Qty: 30 TABLET | Refills: 2 | Status: SHIPPED | OUTPATIENT
Start: 2020-05-08 | End: 2020-09-25

## 2020-05-08 NOTE — PROGRESS NOTES
Subjective:       Patient ID: Mely Viera is a 59 y.o. female.    Chief Complaint: Medication Refill    HPI     The patient location is:  [place]  The chief complaint leading to consultation is:  Medication refill  Visit type: audiovisual  Total time spent with patient:  15 min  Each patient to whom he or she provides medical services by telemedicine is:  (1) informed of the relationship between the physician and patient and the respective role of any other health care provider with respect to management of the patient; and (2) notified that he or she may decline to receive medical services by telemedicine and may withdraw from such care at any time.    Notes:  The patient is here today to refill the medication for anxiety and insomnia.  The patient is currently taking Xanax as needed, she does not take this medicine every night, she has been battling with problems with insomnia for years.  Sometimes she does only sleep 3 hr at night.  The patient stated that he is taking Lunesta as needed other days in order to avoid tolerance to the medications.  The patient denies any symptoms of chest pain, shortness of breath, nausea vomiting at this encounter.    Past medical history, past social history was reviewed and discussed with the patient.    Review of Systems   Constitutional: Negative for activity change and unexpected weight change.   HENT: Negative for hearing loss, rhinorrhea and trouble swallowing.    Eyes: Negative for discharge and visual disturbance.   Respiratory: Negative for chest tightness and wheezing.    Cardiovascular: Negative for chest pain and palpitations.   Gastrointestinal: Negative for blood in stool, constipation, diarrhea and vomiting.   Endocrine: Negative for polydipsia and polyuria.   Genitourinary: Negative for difficulty urinating, dysuria, hematuria and menstrual problem.   Musculoskeletal: Negative for arthralgias, joint swelling and neck pain.   Neurological: Negative for  weakness and headaches.   Psychiatric/Behavioral: Positive for sleep disturbance. Negative for confusion and dysphoric mood.       Objective:      Physical Exam   Constitutional: She appears well-developed and well-nourished. No distress.   HENT:   Head: Normocephalic and atraumatic.   Right Ear: External ear normal.   Left Ear: External ear normal.   Skin: She is not diaphoretic.   Psychiatric: She has a normal mood and affect. Her behavior is normal. Judgment and thought content normal.       Assessment:       1. Other insomnia    2. Anxiety    3. Screening for colon cancer        Plan:       Other insomnia:  Stable    Anxiety:  Stable  -     ALPRAZolam (XANAX) 0.25 MG tablet; Take 1 tablet (0.25 mg total) by mouth nightly as needed for Insomnia or Anxiety.  Dispense: 30 tablet; Refill: 2    Screening for colon cancer  -     Fecal Immunochemical Test (iFOBT); Future; Expected date: 05/08/2020    [place] prescription monitoring program was checked and okay, the fit kit was mailed to the patient's home.  Will continue with alprazolam as needed, the patient will contact us if she needs a new prescription for Lunesta.   Patient agreed with assessment and plan. Patient verbalized understanding.

## 2020-05-08 NOTE — PATIENT INSTRUCTIONS
Eating Heart-Healthy Food: Using the DASH Plan    Eating for your heart doesnt have to be hard or boring. You just need to know how to make healthier choices. The DASH eating plan has been developed to help you do just that. DASH stands for Dietary Approaches to Stop Hypertension. It is a plan that has been proven to be healthier for your heart and to lower your risk for high blood pressure. It can also help lower your risk for cancer, heart disease, osteoporosis, and diabetes.  Choosing from each food group  Choose foods from each of the food groups below each day. Try to get the recommended number of servings for each food group. The serving numbers are based on a diet of 2,000 calories a day. Talk to your doctor if youre unsure about your calorie needs. Along with getting the correct servings, the DASH plan also recommends a sodium intake less than 2,300 mg per day.        Grains  Servings: 6 to 8 a day  A serving is:  · 1 slice bread  · 1 ounce dry cereal  · Half a cup cooked rice, pasta or cereal  Best choices: Whole grains and any grains high in fiber. Vegetables  Servings: 4 to 5 a day  A serving is:  · 1 cup raw leafy vegetable  · Half a cup cut-up raw or cooked vegetable  · Half a cup vegetable juice  Best choices: Fresh or frozen vegetables prepared without added salt or fat.   Fruits  Servings: 4 to 5 a day  A serving is:  · 1 medium fruit  · One-quarter cup dried fruit  · Half a cup fresh, frozen, or canned fruit  · Half a cup of 100% fruit juices  Best choices: A variety of fresh fruits of different colors. Whole fruits are a better choice than fruit juices. Low-fat or fat-free dairy  Servings: 2 to 3 a day  A serving is:  · 1 cup milk  · 1 cup yogurt  · One and a half ounces cheese  Best choices: Skim or 1% milk, low-fat or fat-free yogurt or buttermilk, and low-fat cheeses.         Lean meats, poultry, fish  Servings: 6 or fewer a day  A serving is:  · 1 ounce cooked meats, poultry, or fish  · 1  egg  Best choices: Lean poultry and fish. Trim away visible fat. Broil, grill, roast, or boil instead of frying. Remove skin from poultry before eating. Limit how much red meat you eat.  Nuts, seeds, beans  Servings: 4 to 5 a week  A serving is:  · One-third cup nuts (one and a half ounces)  · 2 tablespoons nut butter or seeds  · Half a cup cooked dry beans or legumes  Best choices: Dry roasted nuts with no salt added, lentils, kidney beans, garbanzo beans, and whole mccracken beans.   Fats and oils  Servings: 2 to 3 a day  A serving is:  · 1 teaspoon vegetable oil  · 1 teaspoon soft margarine  · 1 tablespoon mayonnaise  · 2 tablespoons salad dressing  Best choices: Nut and vegetable oils (nontropical vegetable oils), such as olive and canola oil. Sweets  Servings: 5 a week or fewer  A serving is:  · 1 tablespoon sugar, maple syrup, or honey  · 1 tablespoon jam or jelly  · 1 half-ounce jelly beans (about 15)  · 1 cup lemonade  Best choices: Dried fruit can be a satisfying sweet. Choose low-fat sweets. And watch your serving sizes!      For more on the DASH eating plan, visit:  www.nhlbi.nih.gov/health/health-topics/topics/dash   Date Last Reviewed: 6/1/2016  © 0489-3477 [employer] [address] [place] [address], [place] [address]. All rights reserved. This information is not intended as a substitute for professional medical care. Always follow your healthcare professional's instructions.

## 2020-06-04 ENCOUNTER — PATIENT OUTREACH (OUTPATIENT)
Dept: ADMINISTRATIVE | Facility: HOSPITAL | Age: 60
End: 2020-06-04

## 2020-06-04 NOTE — PROGRESS NOTES
Non-compliant report chart audits. Chart review completed for HM test overdue (mammograms, Colonoscopies, pap smears, DM labs, and/or EYE EXAMs)      Care Everywhere and media, updates requested and reviewed.    FIT KIT ordered 5/8/2020.  Message sent to patient's portal.    Colonoscopy

## 2020-06-04 NOTE — LETTER
June 12, 2020    Mely Viera  [address] [facility] [address]             [facility]  [address]  [facility] [address]  Phone: [phone] Dear Mely Viera     [facility] is committed to your overall health and regular health care screening is essential to maintaining good health.     Our records indicate you may be due for a Colon Cancer Screening.  A colonoscopy is a test to view the lower digestive tract (colon and rectum) to look for colon cancer.  Colorectal cancer (cancer in the colon or rectum) is a leading cause of cancer deaths in the [place]  Because colorectal cancer rarely causes symptoms in its early stages, screening for the disease is important.  I will be able to assist you in getting this recommended procedure started.     If you have had a colonoscopy or colon cancer screening performed at an outside facility, please let me know when and where so the records may be requested for Elisha Street MD to review and update your chart.       I will be happy to assist you with scheduling any necessary appointments or you may contact the [facility] appointment desk at [phone] to schedule at your convenience.     Sincerely,     Elisha Street MD and your [facility]   Mena Echols, Care Coordinator   [facility]   Phone: [phone]   FAX: [phone]

## 2020-07-11 ENCOUNTER — LAB VISIT (OUTPATIENT)
Dept: LAB | Facility: HOSPITAL | Age: 60
End: 2020-07-11
Attending: FAMILY MEDICINE
Payer: COMMERCIAL

## 2020-07-11 DIAGNOSIS — Z12.11 SCREENING FOR COLON CANCER: ICD-10-CM

## 2020-07-11 PROCEDURE — 82274 ASSAY TEST FOR BLOOD FECAL: CPT

## 2020-07-15 LAB — HEMOCCULT STL QL IA: NEGATIVE

## 2020-09-25 DIAGNOSIS — F41.9 ANXIETY: Chronic | ICD-10-CM

## 2020-09-25 RX ORDER — ALPRAZOLAM 0.25 MG/1
TABLET ORAL
Qty: 30 TABLET | Refills: 1 | Status: SHIPPED | OUTPATIENT
Start: 2020-09-25 | End: 2020-11-09 | Stop reason: SDUPTHER

## 2020-09-25 NOTE — TELEPHONE ENCOUNTER
No new care gaps identified.  Powered by [employer]. Reference number: [id number]. 9/25/2020 4:02:45 AM CDT

## 2020-09-25 NOTE — PROGRESS NOTES
Refill Routing Note   Medication(s) are not appropriate for processing by [facility]:       - Outside of protocol           Medication reconciliation completed: No      Automatic Epic Generated Protocol Data:        Requested Prescriptions   Pending Prescriptions Disp Refills    ALPRAZolam (XANAX) 0.25 MG tablet [Pharmacy Med Name: ALPRAZOLAM 0.25MG TABLETS] 30 tablet      Sig: TAKE 1 TABLET(0.25 MG) BY MOUTH EVERY NIGHT AS NEEDED FOR INSOMNIA OR ANXIETY       Off-Protocol Failed - 9/25/2020  4:02 AM        Failed - Medication not assigned to a protocol, review manually.        Passed - Valid encounter within last 12 months     Recent Outpatient Visits            4 months ago [facility]    1 year ago [facility]    1 year ago Encounter for gynecological examination without abnormal finding    [place]  ISABELLA Nazario MD    1 year ago History of cardiac arrhythmia    [facility]    2 years ago Anxiety    [facility] Tatiana Street MD          Future Appointments              In 1 month Elisha Street MD [facility]                      Appointments  past 12m or future 3m with PCP    Date Provider   Last Visit   5/8/2020 Elisha Street MD   Next Visit   11/9/2020 Elisha Street MD   ED visits in past 90 days: 0     Note composed:7:24 AM 09/25/2020

## 2020-10-26 ENCOUNTER — PATIENT OUTREACH (OUTPATIENT)
Dept: ADMINISTRATIVE | Facility: HOSPITAL | Age: 60
End: 2020-10-26

## 2020-10-26 NOTE — PROGRESS NOTES
10/26/2020 Care Everywhere updates requested and reviewed.  Immunizations reconciled. Media reports reviewed.  Duplicate HM overrides and  orders removed.  Overdue HM topic chart audit and/or requested.  Overdue lab testing linked to upcoming lab appointments if applies.    LINKS DOWN 10/26/2020      Health Maintenance Due   Topic Date Due    HIV Screening  10/25/1975    TETANUS VACCINE  10/25/1980    Shingles Vaccine (1 of 2) 10/25/2012    Influenza Vaccine (1) 2020

## 2020-11-09 ENCOUNTER — OFFICE VISIT (OUTPATIENT)
Dept: FAMILY MEDICINE | Facility: CLINIC | Age: 60
End: 2020-11-09
Payer: COMMERCIAL

## 2020-11-09 VITALS
SYSTOLIC BLOOD PRESSURE: 108 MMHG | RESPIRATION RATE: 18 BRPM | TEMPERATURE: 98 F | HEART RATE: 70 BPM | WEIGHT: 121.94 LBS | HEIGHT: 68 IN | DIASTOLIC BLOOD PRESSURE: 74 MMHG | OXYGEN SATURATION: 98 % | BODY MASS INDEX: 18.48 KG/M2

## 2020-11-09 DIAGNOSIS — G47.09 OTHER INSOMNIA: ICD-10-CM

## 2020-11-09 DIAGNOSIS — Z00.01 ANNUAL VISIT FOR GENERAL ADULT MEDICAL EXAMINATION WITH ABNORMAL FINDINGS: Primary | ICD-10-CM

## 2020-11-09 DIAGNOSIS — F41.9 ANXIETY: ICD-10-CM

## 2020-11-09 DIAGNOSIS — R73.09 ABNORMAL GLUCOSE: ICD-10-CM

## 2020-11-09 PROCEDURE — 90682 FLU VACCINE - QUADRIVALENT (RECOMBINANT) PRESERVATIVE FREE: ICD-10-PCS | Mod: S$GLB,,, | Performed by: FAMILY MEDICINE

## 2020-11-09 PROCEDURE — 99396 PR PREVENTIVE VISIT,EST,40-64: ICD-10-PCS | Mod: 25,S$GLB,, | Performed by: FAMILY MEDICINE

## 2020-11-09 PROCEDURE — 90471 FLU VACCINE - QUADRIVALENT (RECOMBINANT) PRESERVATIVE FREE: ICD-10-PCS | Mod: S$GLB,,, | Performed by: FAMILY MEDICINE

## 2020-11-09 PROCEDURE — 90471 IMMUNIZATION ADMIN: CPT | Mod: S$GLB,,, | Performed by: FAMILY MEDICINE

## 2020-11-09 PROCEDURE — 99999 PR PBB SHADOW E&M-EST. PATIENT-LVL IV: CPT | Mod: PBBFAC,,, | Performed by: FAMILY MEDICINE

## 2020-11-09 PROCEDURE — 99999 PR PBB SHADOW E&M-EST. PATIENT-LVL IV: ICD-10-PCS | Mod: PBBFAC,,, | Performed by: FAMILY MEDICINE

## 2020-11-09 PROCEDURE — 99396 PREV VISIT EST AGE 40-64: CPT | Mod: 25,S$GLB,, | Performed by: FAMILY MEDICINE

## 2020-11-09 PROCEDURE — 90682 RIV4 VACC RECOMBINANT DNA IM: CPT | Mod: S$GLB,,, | Performed by: FAMILY MEDICINE

## 2020-11-09 RX ORDER — ALPRAZOLAM 0.25 MG/1
TABLET ORAL
Qty: 30 TABLET | Refills: 5 | Status: SHIPPED | OUTPATIENT
Start: 2020-11-09 | End: 2022-08-15 | Stop reason: SDUPTHER

## 2020-11-09 NOTE — PROGRESS NOTES
Subjective:       Patient ID: Mely Viera is a 60 y.o. female.    Chief Complaint: Annual Exam    HPI     The patient is coming here today for an annual checkup.  The patient complains of anxiety insomnia, takes Xanax as needed, stated that insomnia is not improved, the patient stated if she can try medical marijuana for insomnia.  The patient also had blood work done in June and was told that her cholesterol levels and the glucose levels were elevated.  The patient stated that she does not have the results.    Past medical history, past social history was reviewed and discussed with the patient.    Review of Systems   Constitutional: Negative for activity change and appetite change.   HENT: Negative for congestion and ear discharge.    Eyes: Negative for discharge and itching.   Respiratory: Negative for choking and chest tightness.    Cardiovascular: Negative for chest pain and leg swelling.   Gastrointestinal: Negative for abdominal distention and abdominal pain.   Endocrine: Negative for cold intolerance and heat intolerance.   Genitourinary: Negative for dysuria and flank pain.   Musculoskeletal: Negative for arthralgias and back pain.   Skin: Negative for pallor and rash.   Allergic/Immunologic: Negative for environmental allergies and food allergies.   Neurological: Negative for dizziness and facial asymmetry.   Hematological: Negative for adenopathy. Does not bruise/bleed easily.   Psychiatric/Behavioral: Positive for sleep disturbance. Negative for agitation and confusion. The patient is nervous/anxious.        Objective:      Physical Exam  Vitals signs and nursing note reviewed.   Constitutional:       General: She is not in acute distress.     Appearance: She is well-developed. She is not diaphoretic.   HENT:      Head: Normocephalic and atraumatic.      Right Ear: External ear normal.      Left Ear: External ear normal.      Nose: Nose normal.      Mouth/Throat:      Pharynx: No oropharyngeal  exudate.   Eyes:      General:         Right eye: No discharge.         Left eye: No discharge.      Conjunctiva/sclera: Conjunctivae normal.   Neck:      Musculoskeletal: Neck supple.   Cardiovascular:      Rate and Rhythm: Normal rate and regular rhythm.      Heart sounds: Normal heart sounds. No murmur.   Pulmonary:      Effort: Pulmonary effort is normal. No respiratory distress.      Breath sounds: Normal breath sounds. No wheezing.   Abdominal:      General: There is no distension.      Palpations: There is no mass.   Musculoskeletal:         General: No tenderness or deformity.   Skin:     Coloration: Skin is not pale.      Findings: No erythema.   Neurological:      Cranial Nerves: No cranial nerve deficit.      Coordination: Coordination normal.   Psychiatric:         Behavior: Behavior normal.         Thought Content: Thought content normal.         Judgment: Judgment normal.         Assessment:       1. Annual visit for general adult medical examination with abnormal findings    2. Anxiety    3. Abnormal glucose    4. Other insomnia        Plan:       Annual visit for general adult medical examination with abnormal findings  -     CBC Auto Differential; Future; Expected date: 11/09/2020  -     Comprehensive Metabolic Panel; Future; Expected date: 11/09/2020  -     Lipid Panel; Future; Expected date: 11/09/2020  -     TSH; Future; Expected date: 11/09/2020  -     HIV 1/2 Ag/Ab (4th Gen); Future; Expected date: 11/09/2020  -     Cancel: Influenza - Quadrivalent (MDCK)  -     Hemoglobin A1C; Future; Expected date: 11/09/2020  -     Influenza (FLUBLOK) - Quadrivalent (Recombinant) *Preferred* (PF) - egg allergy    Anxiety:  Stable  -     ALPRAZolam (XANAX) 0.25 MG tablet; TAKE 1 TABLET(0.25 MG) BY MOUTH EVERY NIGHT AS NEEDED FOR INSOMNIA OR ANXIETY  Dispense: 30 tablet; Refill: 5    Abnormal glucose:  New problem workup needed  -     Hemoglobin A1C; Future; Expected date: 11/09/2020    Other insomnia:   [facility] prescription monitoring program was checked and okay, will discontinue the previous prescription for Xanax and will give a new prescription for 6 month supply.  I told the patient a medical marijuana still under research and will not help for insomnia and we are not able to prescribe at [facility].   Patient agreed with assessment and plan. Patient verbalized understanding.

## 2020-11-09 NOTE — PATIENT INSTRUCTIONS
Eating Heart-Healthy Food: Using the DASH Plan    Eating for your heart doesnt have to be hard or boring. You just need to know how to make healthier choices. The DASH eating plan has been developed to help you do just that. DASH stands for Dietary Approaches to Stop Hypertension. It is a plan that has been proven to be healthier for your heart and to lower your risk for high blood pressure. It can also help lower your risk for cancer, heart disease, osteoporosis, and diabetes.  Choosing from each food group  Choose foods from each of the food groups below each day. Try to get the recommended number of servings for each food group. The serving numbers are based on a diet of 2,000 calories a day. Talk to your doctor if youre unsure about your calorie needs. Along with getting the correct servings, the DASH plan also recommends a sodium intake less than 2,300 mg per day.        Grains  Servings: 6 to 8 a day  A serving is:  · 1 slice bread  · 1 ounce dry cereal  · Half a cup cooked rice, pasta or cereal  Best choices: Whole grains and any grains high in fiber. Vegetables  Servings: 4 to 5 a day  A serving is:  · 1 cup raw leafy vegetable  · Half a cup cut-up raw or cooked vegetable  · Half a cup vegetable juice  Best choices: Fresh or frozen vegetables prepared without added salt or fat.   Fruits  Servings: 4 to 5 a day  A serving is:  · 1 medium fruit  · One-quarter cup dried fruit  · Half a cup fresh, frozen, or canned fruit  · Half a cup of 100% fruit juices  Best choices: A variety of fresh fruits of different colors. Whole fruits are a better choice than fruit juices. Low-fat or fat-free dairy  Servings: 2 to 3 a day  A serving is:  · 1 cup milk  · 1 cup yogurt  · One and a half ounces cheese  Best choices: Skim or 1% milk, low-fat or fat-free yogurt or buttermilk, and low-fat cheeses.         Lean meats, poultry, fish  Servings: 6 or fewer a day  A serving is:  · 1 ounce cooked meats, poultry, or fish  · 1  egg  Best choices: Lean poultry and fish. Trim away visible fat. Broil, grill, roast, or boil instead of frying. Remove skin from poultry before eating. Limit how much red meat you eat.  Nuts, seeds, beans  Servings: 4 to 5 a week  A serving is:  · One-third cup nuts (one and a half ounces)  · 2 tablespoons nut butter or seeds  · Half a cup cooked dry beans or legumes  Best choices: Dry roasted nuts with no salt added, lentils, kidney beans, garbanzo beans, and whole mccracken beans.   Fats and oils  Servings: 2 to 3 a day  A serving is:  · 1 teaspoon vegetable oil  · 1 teaspoon soft margarine  · 1 tablespoon mayonnaise  · 2 tablespoons salad dressing  Best choices: Nut and vegetable oils (nontropical vegetable oils), such as olive and canola oil. Sweets  Servings: 5 a week or fewer  A serving is:  · 1 tablespoon sugar, maple syrup, or honey  · 1 tablespoon jam or jelly  · 1 half-ounce jelly beans (about 15)  · 1 cup lemonade  Best choices: Dried fruit can be a satisfying sweet. Choose low-fat sweets. And watch your serving sizes!      For more on the DASH eating plan, visit:  www.nhlbi.nih.gov/health/health-topics/topics/dash   Date Last Reviewed: 6/1/2016  © 7287-7302 [employer]. [address] [place] [address], [place] [address]. All rights reserved. This information is not intended as a substitute for professional medical care. Always follow your healthcare professional's instructions.

## 2020-11-11 ENCOUNTER — LAB VISIT (OUTPATIENT)
Dept: LAB | Facility: HOSPITAL | Age: 60
End: 2020-11-11
Attending: FAMILY MEDICINE
Payer: COMMERCIAL

## 2020-11-11 DIAGNOSIS — Z00.01 ANNUAL VISIT FOR GENERAL ADULT MEDICAL EXAMINATION WITH ABNORMAL FINDINGS: ICD-10-CM

## 2020-11-11 DIAGNOSIS — R73.09 ABNORMAL GLUCOSE: ICD-10-CM

## 2020-11-11 LAB
ALBUMIN SERPL BCP-MCNC: 4.2 G/DL (ref 3.5–5.2)
ALP SERPL-CCNC: 66 U/L (ref 55–135)
ALT SERPL W/O P-5'-P-CCNC: 18 U/L (ref 10–44)
ANION GAP SERPL CALC-SCNC: 10 MMOL/L (ref 8–16)
AST SERPL-CCNC: 23 U/L (ref 10–40)
BASOPHILS # BLD AUTO: 0.04 K/UL (ref 0–0.2)
BASOPHILS NFR BLD: 1 % (ref 0–1.9)
BILIRUB SERPL-MCNC: 0.7 MG/DL (ref 0.1–1)
BUN SERPL-MCNC: 12 MG/DL (ref 6–20)
CALCIUM SERPL-MCNC: 9.4 MG/DL (ref 8.7–10.5)
CHLORIDE SERPL-SCNC: 105 MMOL/L (ref 95–110)
CHOLEST SERPL-MCNC: 187 MG/DL (ref 120–199)
CHOLEST/HDLC SERPL: 4.3 {RATIO} (ref 2–5)
CO2 SERPL-SCNC: 28 MMOL/L (ref 23–29)
CREAT SERPL-MCNC: 0.9 MG/DL (ref 0.5–1.4)
DIFFERENTIAL METHOD: ABNORMAL
EOSINOPHIL # BLD AUTO: 0.1 K/UL (ref 0–0.5)
EOSINOPHIL NFR BLD: 2.2 % (ref 0–8)
ERYTHROCYTE [DISTWIDTH] IN BLOOD BY AUTOMATED COUNT: 12.9 % (ref 11.5–14.5)
EST. GFR  (AFRICAN AMERICAN): >60 ML/MIN/1.73 M^2
EST. GFR  (NON AFRICAN AMERICAN): >60 ML/MIN/1.73 M^2
ESTIMATED AVG GLUCOSE: 103 MG/DL (ref 68–131)
GLUCOSE SERPL-MCNC: 90 MG/DL (ref 70–110)
HBA1C MFR BLD HPLC: 5.2 % (ref 4–5.6)
HCT VFR BLD AUTO: 44.1 % (ref 37–48.5)
HDLC SERPL-MCNC: 44 MG/DL (ref 40–75)
HDLC SERPL: 23.5 % (ref 20–50)
HGB BLD-MCNC: 13.9 G/DL (ref 12–16)
IMM GRANULOCYTES # BLD AUTO: 0 K/UL (ref 0–0.04)
IMM GRANULOCYTES NFR BLD AUTO: 0 % (ref 0–0.5)
LDLC SERPL CALC-MCNC: 109 MG/DL (ref 63–159)
LYMPHOCYTES # BLD AUTO: 1.8 K/UL (ref 1–4.8)
LYMPHOCYTES NFR BLD: 43.4 % (ref 18–48)
MCH RBC QN AUTO: 31.6 PG (ref 27–31)
MCHC RBC AUTO-ENTMCNC: 31.5 G/DL (ref 32–36)
MCV RBC AUTO: 100 FL (ref 82–98)
MONOCYTES # BLD AUTO: 0.4 K/UL (ref 0.3–1)
MONOCYTES NFR BLD: 9.4 % (ref 4–15)
NEUTROPHILS # BLD AUTO: 1.8 K/UL (ref 1.8–7.7)
NEUTROPHILS NFR BLD: 44 % (ref 38–73)
NONHDLC SERPL-MCNC: 143 MG/DL
NRBC BLD-RTO: 0 /100 WBC
PLATELET # BLD AUTO: 221 K/UL (ref 150–350)
PMV BLD AUTO: 11.4 FL (ref 9.2–12.9)
POTASSIUM SERPL-SCNC: 3.9 MMOL/L (ref 3.5–5.1)
PROT SERPL-MCNC: 7.6 G/DL (ref 6–8.4)
RBC # BLD AUTO: 4.4 M/UL (ref 4–5.4)
SODIUM SERPL-SCNC: 143 MMOL/L (ref 136–145)
TRIGL SERPL-MCNC: 170 MG/DL (ref 30–150)
TSH SERPL DL<=0.005 MIU/L-ACNC: 2.97 UIU/ML (ref 0.4–4)
WBC # BLD AUTO: 4.17 K/UL (ref 3.9–12.7)

## 2020-11-11 PROCEDURE — 84443 ASSAY THYROID STIM HORMONE: CPT

## 2020-11-11 PROCEDURE — 36415 COLL VENOUS BLD VENIPUNCTURE: CPT | Mod: PO

## 2020-11-11 PROCEDURE — 85025 COMPLETE CBC W/AUTO DIFF WBC: CPT

## 2020-11-11 PROCEDURE — 80061 LIPID PANEL: CPT

## 2020-11-11 PROCEDURE — 80053 COMPREHEN METABOLIC PANEL: CPT

## 2020-11-11 PROCEDURE — 83036 HEMOGLOBIN GLYCOSYLATED A1C: CPT

## 2020-11-11 PROCEDURE — 86703 HIV-1/HIV-2 1 RESULT ANTBDY: CPT

## 2020-11-12 LAB — HIV 1+2 AB+HIV1 P24 AG SERPL QL IA: NEGATIVE

## 2020-12-11 ENCOUNTER — TELEPHONE (OUTPATIENT)
Dept: FAMILY MEDICINE | Facility: CLINIC | Age: 60
End: 2020-12-11

## 2020-12-11 ENCOUNTER — PATIENT MESSAGE (OUTPATIENT)
Dept: FAMILY MEDICINE | Facility: CLINIC | Age: 60
End: 2020-12-11

## 2020-12-11 ENCOUNTER — OFFICE VISIT (OUTPATIENT)
Dept: FAMILY MEDICINE | Facility: CLINIC | Age: 60
End: 2020-12-11
Payer: COMMERCIAL

## 2020-12-11 DIAGNOSIS — R09.89 CHEST CONGESTION: ICD-10-CM

## 2020-12-11 DIAGNOSIS — R05.9 COUGH: ICD-10-CM

## 2020-12-11 DIAGNOSIS — U07.1 COVID-19 VIRUS INFECTION: Primary | ICD-10-CM

## 2020-12-11 PROCEDURE — 99213 OFFICE O/P EST LOW 20 MIN: CPT | Mod: 95,,, | Performed by: NURSE PRACTITIONER

## 2020-12-11 PROCEDURE — 99213 PR OFFICE/OUTPT VISIT, EST, LEVL III, 20-29 MIN: ICD-10-PCS | Mod: 95,,, | Performed by: NURSE PRACTITIONER

## 2020-12-11 RX ORDER — ALBUTEROL SULFATE 90 UG/1
2 AEROSOL, METERED RESPIRATORY (INHALATION) EVERY 6 HOURS PRN
Qty: 18 G | Refills: 0 | Status: SHIPPED | OUTPATIENT
Start: 2020-12-11 | End: 2021-06-08

## 2020-12-11 RX ORDER — CODEINE PHOSPHATE AND GUAIFENESIN 10; 100 MG/5ML; MG/5ML
5 SOLUTION ORAL EVERY 6 HOURS PRN
Qty: 120 ML | Refills: 1 | Status: SHIPPED | OUTPATIENT
Start: 2020-12-11 | End: 2020-12-21

## 2020-12-11 NOTE — TELEPHONE ENCOUNTER
Spoke to patient, there were no available appointments with [facility]. [place] virtual with Luis.

## 2020-12-11 NOTE — TELEPHONE ENCOUNTER
----- Message from Doris Crisostomo sent at 12/10/2020  3:28 PM CST -----  Contact: patient  Type: Needs Medical Advice  Who Called:  patient    Best Call Back Number: [phone] (home)     Additional Information: tested positive for covid and would like to know if there is anything that can be prescribed mainly for her cough and shortness of breath but states she has no congestion, please call to advise

## 2020-12-11 NOTE — PROGRESS NOTES
Mely Viera is a 60 y.o. female patient of Elisha Street MD who presents to the clinic today for a Virtual Visit.    The patient location is: [place], [place]  The chief complaint leading to consultation is: feeling very bad with her case of Covid  Visit type: audiovisual  Total time spent with patient: 20 minutes  Each patient to whom he or she provides medical services by telemedicine is:  (1) informed of the relationship between the physician and patient and the respective role of any other health care provider with respect to management of the patient; and (2) notified that he or she may decline to receive medical services by telemedicine and may withdraw from such care at any time.    22 minutes of total time spent on the encounter, which includes face to face time and non-face to face time preparing to see the patient (eg, review of tests), Obtaining and/or reviewing separately obtained history, Documenting clinical information in the electronic or other health record, Independently interpreting results (not separately reported) and communicating results to the patient/family/caregiver, or Care coordination (not separately reported).     HPI    Pt, who is not known to me, reports a new problem to me: + covid test 2 days ago.  Has questions .  Dizzy, chest congested, queazy, nasal congestion.  Did have fever 3 days ago for 1 day.  Since then feeling worse, very achey and fatigued.  Not sure of contact.  Taking an exercise class at a gymn.  These symptoms began 7 days  ago and status is continuing.     Symptoms are + acute.    Pt denies the following symptoms:  fever    Aggravating factors include moving around .    Relieving factors include nothing .    OTC Medications tried are Vit C,D, Mucinex, Sudafed, Elderberry.    Prescription medications taken for symptoms are nothing.    Pertinent medical history:  Tested + 2 days ago for Covid..    Smoking status:  nonsmoker    ROS    Constitutional:   No  fever, +++  fatigue, +++body aches,  decrease in appetite.    Head:   No headache  Ears:   No pain.  Eyes:  No sxs  Nose:   No sinus pain, no congestion, no runny nose, no post nasal drip.  Throat:  No ST pain, no difficulty swallowing.    Heart:  No palpitations, chest pain.    Lungs:  No difficulty breathing, no coughing, no sputum production, n wheezing.              Symptoms are community acquired.    GI/:  No sxs    MS:  No new bone, joint or muscle problems.    Skin:  No rashes, itching.    Past Medical History:   Diagnosis Date    Anxiety     Depression     Heat stroke     HSV (herpes simplex virus) infection     Sleep disturbance        Current Outpatient Medications:     ALPRAZolam (XANAX) 0.25 MG tablet, TAKE 1 TABLET(0.25 MG) BY MOUTH EVERY NIGHT AS NEEDED FOR INSOMNIA OR ANXIETY, Disp: 30 tablet, Rfl: 5    eszopiclone (LUNESTA) 2 MG Tab, Take 1 tablet (2 mg total) by mouth nightly as needed., Disp: 30 tablet, Rfl: 2    naproxen (NAPROSYN) 500 MG tablet, Take one tablet PO BID with meals as needed for headache, Disp: 20 tablet, Rfl: 0    valACYclovir (VALTREX) 500 MG tablet, Take 1 tablet (500 mg total) by mouth once daily., Disp: 30 tablet, Rfl: 11      PHYSICAL EXAM    There were no vitals filed for this visit.  Vital signs not taken per patient.  Patient's questionnaire (if available), medications & PMH all reviewed today.    Gen:  Alert, coop 60 y.o. female patient in no acute distress, is not ill-appearing.           Well developed, well-nourished  Psych:   Behavior and affect appropriate for the situation and setting.  HENT:   Normocephalic, atraumatic.  Symmetrical facial movements.    Eyes without visible discharge or swelling.  No sneezing during the visit.  Voice steady, no hoarseness noted.  Resp:   Normal respiratory effort  No retractions  No increased work of breathing  No audible wheezes  Talks in full sentences.  No coughing during the interaction today.  CV:   No catrina oral  cyanosis  MSK:  Head and upper extremity movements smooth and even.  Neuro:  Responds promptly to questions showing good insight.  Skin:   No observed rashes/bruises    COVID-19 virus infection    Cough    Chest congestion  -     albuterol (PROVENTIL/VENTOLIN HFA) 90 mcg/actuation inhaler; Inhale 2 puffs into the lungs every 6 (six) hours as needed for Wheezing. Rescue  Dispense: 18 g; Refill: 0    Other orders  -     guaifenesin-codeine 100-10 mg/5 ml (GUAIFENESIN AC)  mg/5 mL syrup; Take 5 mLs by mouth every 6 (six) hours as needed for Cough.  Dispense: 120 mL; Refill: 1  -     COVID-19 Surveillance Program  -     pulse oximeter (PULSE OXIMETER) device; Use twice daily at 8 AM and 3 PM and record the value in MyChart as directed.  Dispense: 1 each; Refill: 0        Pt today presents with worsening myalgias and fatigue, plus headache and decreased appetitie since her diagnosis of Covid 19 infection 2 days ago. .  Additionally, her daughter also lives with her but is having no symptoms.    Physical exam shows key findings of alert, coop, fatigued-appearing, 60 yr old female in NAD.  Lying down for part of the interview.    Findings consistent with myalgias, fatigue and chest congestion resulting from Covid infection. .    This is a new problem to me.  No work up is planned.        Pt advised to perform comfort measures recommended on patient instruction sheet, which were reviewed at the time of the visit..    If worse or concerns, the patient is advised to call for advise to this office or the PCP office or call [facility] or go to the nearest URGENT CARE or ER.  Explained exam findings, diagnosis and treatment plan to patient.  Questions answered and patient states understanding.

## 2020-12-12 NOTE — PATIENT INSTRUCTIONS
For answers to your questions about COVID-19, please call the [facility] Hotline at [phone], where you can be provided with up-to-date information and recommendations.  Thank for choosing [facility].      Evenings and weekends [facility] On Call is available if symptoms worsen or fail to improve.  When you call the number, a registered nurse answers and takes your information.  The nurse can look at your medical record and make recommendations or call the on call physician, if warranted.  [phone]    The [facility]-associated Urgent Cares below can view your medical record and know all your medical history on the spot:    [facility] Address: [address] [place] [address] Phone: [phone]    [facility] Address:  [address] [place] [address] [place] [address] Phone: [phone]    [facility] Address:  [address] [place] [address]  Phone: [phone].    Instructions for Patients with Confirmed or Suspected COVID-19    If you are awaiting your test result, you will either be called or it will be released to the patient portal.  If you have any questions about your test, please visit [email or web address] or call our COVID-19 information line at [phone].      Instructions for non-hospitalized or discharged patients with confirmed or suspected COVID-19:       Stay home except to get medical care.    Separate yourself from other people and animals in your home.    Call ahead before visiting your doctor.    Wear a face mask.    Cover your coughs and sneezes.    Clean your hands often.    Avoid sharing personal household items.    Clean all high-touch surfaces every day.    Monitor your symptoms. Seek prompt medical attention if your illness is worsening (e.g., difficulty breathing). Before seeking care, call your healthcare provider.    If you have a medical emergency and must call 911, notify the dispatcher that you have or are  being evaluated for COVID-19. If possible, put on a face mask before emergency medical services arrive.    Use the following symptom-based strategy to return to normal activity following a suspected or confirmed case of COVID-19. Continue isolation until:   o At least 3 days (72 hours) have passed since recovery defined as resolution of fever without the use of fever-reducing medications and improvement in respiratory symptoms (e.g. cough, shortness of breath), and   o At least 10 days have passed since the first positive test.       As one of the next steps, you will receive a call or text from the [place] Department of Health ([facility]) COVID-19 Tracing Team. See the contact information below so you know not to ignore the health departments call. It is important that you contact them back immediately so they can help.     Contact Tracer Number:  [phone]  Caller ID for most carriers: [place] Dept Health    What is contact tracing?   Contact tracing is a process that helps identify everyone who has been in close contact with an infected person. Contact tracers let those people know they may have been exposed and guide them on next steps. Confidentiality is important for everyone; no one will be told who may have exposed them to the virus.   Your involvement is important. The more we know about where and how this virus is spreading, the better chance we have at stopping it from spreading further.  What does exposure mean?   Exposure means you have been within 6 feet for more than 15 minutes with a person who has or had COVID-19.  What kind of questions do the contact tracers ask?   A contact tracer will confirm your basic contact information including name, address, phone number, and next of kin, as well as asking about any symptoms you may have had. Theyll also ask you how you think you may have gotten sick, such as places where you may have been exposed to the virus, and people you were with. Those names will  never be shared with anyone outside of that call, and will only be used to help trace and stop the spread of the virus.   I have privacy concerns. How will the state use my information?   Your privacy about your health is important. All calls are completed using call centers that use the appropriate health privacy protection measures (HIPAA compliance), meaning that your patient information is safe. No one will ever ask you any questions related to immigration status. Your health comes first.   Do I have to participate?   You do not have to participate, but we strongly encourage you to. Contact tracing can help us catch and control new outbreaks as theyre developing to keep your friends and family safe.   What if I dont hear from anyone?   If you dont receive a call within 24 hours, you can call the number above right away to inquire about your status. That line is open from 8:00 am - 8:00 p.m., 7 days a week.  Contact tracing saves lives! Together, we have the power to beat this virus and keep our loved ones and neighbors safe.       Instructions for household members, intimate partners and caregivers in a non-healthcare setting of a patient with confirmed or suspected COVID-19:         Close contacts should monitor their health and call their healthcare provider right away if they develop symptoms suggestive of COVID-19 (e.g., fever, cough, shortness of breath).    Stay home except to get medical care. Separate yourself from other people and animals in the home.   Monitor the patients symptoms. If the patient is getting sicker, call his or her healthcare provider. If the patient has a medical emergency and you need to call 911, notify the dispatch personnel that the patient has or is being evaluated for COVID-19.    Wear a facemask when around other people such as sharing a room or vehicle and before entering a healthcare provider's office.   Cover coughs and sneezes with a tissue. Throw used tissues  in a lined trash can immediately and wash hands.   Clean hands often with soap and water for at least 20 seconds or with an alcohol-based hand , rubbing hands together until they feel dry. Avoid touching your eyes, nose, and mouth with unwashed hands.   Clean all high-touch; surfaces every day, including counters, tabletops, doorknobs, bathroom fixtures, toilets, phones, keyboards, tablets, bedside tables, etc. Use a household cleaning spray or wipe according to label instructions.   Avoid sharing personal household items such as dishes, drinking glasses, cups, towels, bedding, etc. After these items are used, they should be washed thoroughly with soap and water.   Continue isolation until:   At least 3 days (72 hours) have passed since recovery defined as resolution of fever without the use of fever-reducing medications and improvement in respiratory symptoms (e.g. cough, shortness of breath), and    At least 10 days have passed since the patients first positive test.    https://www.cdc.gov/coronavirus/2019-ncov/your-health/index.htm    General    Your personal protection is the priority. Personal protective equipment (PPE) should be available for all providers to ensure droplet/contact isolation precautions can be achieved. Providers and organizations should review protocols for donning and doffing PPE. Careful attention is required to avoid self-contamination.     Patients with confirmed or suspected 2019-nCoV infected cases:  · Should NOT be brought to holding or PACU areas   · Should be managed in a designated OR, with signs posted on the doors to minimize staff exposure.   · Should be recovered in the OR or transferred to ICU into a negative pressure room. Ensure a high quality HMEF (Heat and Moisture Exchanging Filter) rated to remove at least 99.97% of airborne particles 0.3 microns or greater is placed between the ETT and reservoir bag during transfers to avoid contaminating the  atmosphere.     Plan ahead:   · For time to allow all staff to apply PPE and barrier precautions   · Consider intubation early to avoid the risk of a crash intubation when PPE cannot be applied safely.    During Airway Manipulation    Apply:   · Disposable mask, goggles, footwear, gown and gloves. Consider adopting the double glove technique. o Standard ASA monitoring should be applied before induction of anesthesia. o N95 mask at a minimum should be utilized. PAPR devices may offer superior protection when manipulating an airway of an infected patient.     Assign:   · Designate the most experienced anesthesia professionals available to perform intubation, if possible. Avoid trainee intubation for sick patients.     Avoid:   · Awake fiberoptic intubation, unless specifically indicated. Atomized local anesthetic can aerosolize the virus.     Prepare to:   · Preoxygenate for 5 minutes with 100% FiO2 o Perform a rapid sequence induction (RSI) to avoid manual ventilation of patient's lungs and potential aerosolization of virus from airways. o Consider using a video-laryngoscope.     RSI:   · Depending on the clinical condition, the RSI may need to be modified. If manual ventilation is required, apply small tidal volumes.     Use:   · Ensure there is a high quality HMEF (Heat and Moisture Exchanging Filter) rated to remove at least 99.97% of airborne particles 0.3 microns or greater placed in between the facemask and breathing circuit or between facemask and reservoir bag.     Dispose:   · Re-sheath the laryngoscope immediately post intubation (double glove technique) o Seal all used airway equipment in a double zip-locked plastic bag. It must then be removed for decontamination and disinfection.     Remember:   · After removing protective equipment, avoid touching your hair or face before washing hands.      From https://www.cdc.gov/coronavirus/2019-ncov/faq.html#Symptoms-&-Emergency-Warning-Signs:    Look for emergency  warning signs* for COVID-19. If someone is showing any of these signs, seek emergency medical care immediately  · Trouble breathing  · Persistent pain or pressure in the chest  · New confusion  · Inability to wake or stay awake  · Bluish lips or face  *This list is not all possible symptoms. Please call your medical provider for any other symptoms that are severe or concerning to you.  Call 911 or call ahead to your local emergency facility: Notify the  that you are seeking care for someone who has or may have COVID-19.    Comfort measures for your symptoms.  We'll call with the results of your test, probably Sunday.      If you have concerns or questions, please do not hesitate to call.  If you have any questions, please do not hesitate to call.  You can reach us at [phone] Monday through Friday 7 a.m. to 6:30 p.m., Saturday 9 a.m. to 4:30 p.m. and Sunday 9 a.m to 2:30 p.m.  Or call Dr. Street/NICHOLE Serrano    Thank you for using the [facility]!    HELEN Oliver, CNP, FNP-BC  [facility]    To rate your experience with FRITZ Oliver, click on the link below:      [email or web address]

## 2020-12-13 ENCOUNTER — TELEPHONE (OUTPATIENT)
Dept: FAMILY MEDICINE | Facility: CLINIC | Age: 60
End: 2020-12-13

## 2020-12-13 ENCOUNTER — PATIENT MESSAGE (OUTPATIENT)
Dept: ADMINISTRATIVE | Facility: OTHER | Age: 60
End: 2020-12-13

## 2020-12-13 ENCOUNTER — NURSE TRIAGE (OUTPATIENT)
Dept: ADMINISTRATIVE | Facility: CLINIC | Age: 60
End: 2020-12-13

## 2020-12-13 NOTE — TELEPHONE ENCOUNTER
Called pt to ask how she's doing.  No answer.   LVMTCB if she needs anything.  Verbal reminder to call [facility] ON CALL for questions or concerns.  Aidee CERVANTES, FNP-BC

## 2020-12-13 NOTE — TELEPHONE ENCOUNTER
Contacted pt for enrollment in Covid Surveillance program. Pt verified name and . Pt has MyChart and able to get consent submitted. Pt has pulse ox. Verified contact and emergency contact info. Went over surveillance program. Pt able to get VS and symptoms submitted. All VS and symptoms WNL so no further triage required at this time. Pt did admit to worsening symptoms of congestion and dizziness/nausea. Pt was using Mucinex, Sudafed, and nose spray. Advised pt to just use the Mucinex and stop using nose spray. Advised to drink plenty of fluids to help with congestion and prevent dehydration and gave other home care advice. Advised if SpO2 ever falls below 94% and does not increase after a few deep breaths to go to ED. Gave number to [facility] if symptoms worsen or any further concerns. Pt has no further concerns at this time. Will continue to monitor.    Called patient to review COVID-19 Surveillance Program enrollment process.    Smartphone: yes    [facility]: yes    Program consent: yes    Pulse oximeter status: yes    Verified emergency contacts: yes    Program Overview: Reviewed , no response process, and importance of correct emergency contacts in event that well-being check is warranted. Patient will call [phone]  to speak with an OnCall nurse, if needed. Pt aware that if Sp02 <94% or if they have any worsening symptoms, they need to go to the emergency department. If they are having a medical emergency, they will call 911. Otherwise, patient will continue to submit data as scheduled. Reviewed importance of wearing mask if self or family members leave the house.     Patient had no further questions.        Reason for Disposition   Health Information question, no triage required and triager able to answer question    Protocols used: INFORMATION ONLY CALL - NO TRIAGE-A-

## 2020-12-14 ENCOUNTER — PATIENT MESSAGE (OUTPATIENT)
Dept: ADMINISTRATIVE | Facility: OTHER | Age: 60
End: 2020-12-14

## 2020-12-15 ENCOUNTER — PATIENT MESSAGE (OUTPATIENT)
Dept: ADMINISTRATIVE | Facility: OTHER | Age: 60
End: 2020-12-15

## 2020-12-16 ENCOUNTER — PATIENT MESSAGE (OUTPATIENT)
Dept: ADMINISTRATIVE | Facility: OTHER | Age: 60
End: 2020-12-16

## 2020-12-17 ENCOUNTER — PATIENT MESSAGE (OUTPATIENT)
Dept: ADMINISTRATIVE | Facility: OTHER | Age: 60
End: 2020-12-17

## 2020-12-17 ENCOUNTER — NURSE TRIAGE (OUTPATIENT)
Dept: ADMINISTRATIVE | Facility: CLINIC | Age: 60
End: 2020-12-17

## 2020-12-18 ENCOUNTER — PATIENT MESSAGE (OUTPATIENT)
Dept: ADMINISTRATIVE | Facility: OTHER | Age: 60
End: 2020-12-18

## 2020-12-19 ENCOUNTER — PATIENT MESSAGE (OUTPATIENT)
Dept: ADMINISTRATIVE | Facility: OTHER | Age: 60
End: 2020-12-19

## 2020-12-20 ENCOUNTER — PATIENT MESSAGE (OUTPATIENT)
Dept: ADMINISTRATIVE | Facility: OTHER | Age: 60
End: 2020-12-20

## 2020-12-21 ENCOUNTER — PATIENT MESSAGE (OUTPATIENT)
Dept: ADMINISTRATIVE | Facility: OTHER | Age: 60
End: 2020-12-21

## 2020-12-21 ENCOUNTER — NURSE TRIAGE (OUTPATIENT)
Dept: ADMINISTRATIVE | Facility: CLINIC | Age: 60
End: 2020-12-21

## 2020-12-21 ENCOUNTER — TELEPHONE (OUTPATIENT)
Dept: ADMINISTRATIVE | Facility: CLINIC | Age: 60
End: 2020-12-21

## 2020-12-21 ENCOUNTER — TELEPHONE (OUTPATIENT)
Dept: FAMILY MEDICINE | Facility: CLINIC | Age: 60
End: 2020-12-21

## 2020-12-21 NOTE — TELEPHONE ENCOUNTER
Reason for Disposition   Nursing judgment    Protocols used: INFORMATION ONLY CALL - NO TRIAGE-A-[place]

## 2020-12-21 NOTE — TELEPHONE ENCOUNTER
"Patient escalated due to abnormal vital sign and/or symptom; however, patient did not answer follow up nurse phone call. Per protocol, each number in charted was tried once  and voicemails were left instructing patient to call back or seek emergent care if needed. Initial escalation as follows: "YES" to sx are worse and SOB 2/5 with activity.    Reason for Disposition   Caller has cancelled the call before the first contact    Protocols used: NO CONTACT OR DUPLICATE CONTACT CALL-A-AH      "

## 2020-12-21 NOTE — TELEPHONE ENCOUNTER
Patient called back re escalation of worsening S/S. States she has a really bad headache, and last night a heaviness in her chest,could not take a deep breath and felt dizzy, had to hold on to wall. Did check her Sats and they were 95%. Today did jennifer pot, but still has heaviness in chest. Had episode of arrhythima Called re e worsening S/S. Really bad headache, heaviness in chest, could not take a deep breath, dizzy ,had to hold on wall, 95 %, today, did jennifer pot, heaviness chest, states two years ago had episode of arrhythmia, feels like her heart was beating in a strange way. Informed I would contact ENRIQUETA, and we would follow up.

## 2020-12-21 NOTE — TELEPHONE ENCOUNTER
Called patient due to RN escalation in COVID Surveillance program. Pt escalated due to chest heaviness and worsening s/s.    Patient location: home.  [place]    Vitals: Sp02 : 99%. P: 73. Temp: 97.3  Ambulatory Sp02 on the phone (if applicable): na     60 y.o. female with pertinent PMHx Anxiety, palpitations and NSVT on day 11 of Covid symptoms. Positive Covid screen 12/9/2020. CXR not done. Home oxygen: no. COVID-19 Hospitalization History: none.    Patient seen on 12/11/2020 with similar symptoms of chest heaviness and migraine headache.    HPI:  Patient states started with a migraine headache and chest heaviness last night.  She states this is similar to episode on 12/11/2020.  Cough improving.  States may have had a few palpitations yesterday.  Patient unable to describe symptoms well.      ROS: Denies worsening cough, light headedness, fever, chills, diaphoresis, chest pain, abdominal pain, emesis, diarrhea or further symptoms.     Assessment: Vitals appear WNL. During phone call, patient appears alert and oriented. Able to speak in full sentences without difficulty. No audible wheezing heard during call.    Plan:    Reviewed with patient the reasons for seeking emergency care. Pt aware that if Sp02 <94% or if they have any worsening symptoms, they need to go to the emergency department. If they are having a medical emergency, they will call 911. Otherwise, patient will continue to submit data as scheduled. Reviewed importance of wearing mask if self or family members leave the house.     Advised next steps: PCP outreach.  Recommend to be seen today.  Possible CXR and/or EKG warranted.    Patient advised to go to ER if symptoms worsen.

## 2020-12-21 NOTE — TELEPHONE ENCOUNTER
Can you schedule the patient for a telemedicine visit with somebody in the team, I do not have any appointments available today, if not availability with anybody else can you please tell the patient to go to urgent care because she is having chest tightness.  Thank you

## 2020-12-21 NOTE — TELEPHONE ENCOUNTER
----- Message from Jonatan Gabriel PA-C sent at 12/21/2020 10:33 AM CST -----  Spoke to patient this morning through the COVID surveillance program.  States she started having chest heaviness last night with a migraine.  States this is similar to her episode on 12/11/2020 but is concerned because she had started to feel better.  Afebrile, VSS.  I think she may benefit to be seen and a CXR.  If you cannot see today will advise to go to urgent care or ER.      Thanks,  Jonatan Gabriel PA-C

## 2020-12-22 ENCOUNTER — PATIENT MESSAGE (OUTPATIENT)
Dept: ADMINISTRATIVE | Facility: OTHER | Age: 60
End: 2020-12-22

## 2020-12-23 ENCOUNTER — PATIENT MESSAGE (OUTPATIENT)
Dept: ADMINISTRATIVE | Facility: OTHER | Age: 60
End: 2020-12-23

## 2020-12-23 ENCOUNTER — NURSE TRIAGE (OUTPATIENT)
Dept: ADMINISTRATIVE | Facility: CLINIC | Age: 60
End: 2020-12-23

## 2020-12-24 ENCOUNTER — PATIENT MESSAGE (OUTPATIENT)
Dept: ADMINISTRATIVE | Facility: OTHER | Age: 60
End: 2020-12-24

## 2020-12-25 ENCOUNTER — PATIENT MESSAGE (OUTPATIENT)
Dept: ADMINISTRATIVE | Facility: OTHER | Age: 60
End: 2020-12-25

## 2021-01-13 DIAGNOSIS — Z12.31 OTHER SCREENING MAMMOGRAM: ICD-10-CM

## 2021-01-20 ENCOUNTER — PATIENT OUTREACH (OUTPATIENT)
Dept: ADMINISTRATIVE | Facility: HOSPITAL | Age: 61
End: 2021-01-20

## 2021-05-02 ENCOUNTER — PATIENT MESSAGE (OUTPATIENT)
Dept: ADMINISTRATIVE | Facility: HOSPITAL | Age: 61
End: 2021-05-02

## 2021-05-21 ENCOUNTER — PATIENT MESSAGE (OUTPATIENT)
Dept: ADMINISTRATIVE | Facility: HOSPITAL | Age: 61
End: 2021-05-21

## 2021-06-07 ENCOUNTER — TELEPHONE (OUTPATIENT)
Dept: FAMILY MEDICINE | Facility: CLINIC | Age: 61
End: 2021-06-07

## 2021-06-08 ENCOUNTER — OFFICE VISIT (OUTPATIENT)
Dept: FAMILY MEDICINE | Facility: CLINIC | Age: 61
End: 2021-06-08
Payer: COMMERCIAL

## 2021-06-08 DIAGNOSIS — B85.4: Primary | ICD-10-CM

## 2021-06-08 PROCEDURE — 99214 OFFICE O/P EST MOD 30 MIN: CPT | Mod: 95,,, | Performed by: PHYSICIAN ASSISTANT

## 2021-06-08 PROCEDURE — 99214 PR OFFICE/OUTPT VISIT, EST, LEVL IV, 30-39 MIN: ICD-10-PCS | Mod: 95,,, | Performed by: PHYSICIAN ASSISTANT

## 2021-06-08 RX ORDER — PERMETHRIN 50 MG/G
CREAM TOPICAL ONCE
Qty: 60 G | Refills: 1 | Status: SHIPPED | OUTPATIENT
Start: 2021-06-08 | End: 2021-06-08

## 2021-07-01 ENCOUNTER — LAB VISIT (OUTPATIENT)
Dept: LAB | Facility: HOSPITAL | Age: 61
End: 2021-07-01
Attending: FAMILY MEDICINE
Payer: COMMERCIAL

## 2021-07-01 DIAGNOSIS — Z12.11 ENCOUNTER FOR FIT (FECAL IMMUNOCHEMICAL TEST) SCREENING: ICD-10-CM

## 2021-07-01 PROCEDURE — 82274 ASSAY TEST FOR BLOOD FECAL: CPT | Performed by: FAMILY MEDICINE

## 2021-07-07 ENCOUNTER — PATIENT MESSAGE (OUTPATIENT)
Dept: ADMINISTRATIVE | Facility: HOSPITAL | Age: 61
End: 2021-07-07

## 2021-07-08 DIAGNOSIS — Z12.11 ENCOUNTER FOR FIT (FECAL IMMUNOCHEMICAL TEST) SCREENING: Primary | ICD-10-CM

## 2021-07-10 ENCOUNTER — CLINICAL SUPPORT (OUTPATIENT)
Dept: URGENT CARE | Facility: CLINIC | Age: 61
End: 2021-07-10
Payer: COMMERCIAL

## 2021-07-10 DIAGNOSIS — Z20.822 ENCOUNTER FOR LABORATORY TESTING FOR COVID-19 VIRUS: Primary | ICD-10-CM

## 2021-07-10 LAB
CTP QC/QA: YES
SARS-COV-2 RDRP RESP QL NAA+PROBE: NEGATIVE

## 2021-07-10 PROCEDURE — U0002: ICD-10-PCS | Mod: QW,S$GLB,, | Performed by: FAMILY MEDICINE

## 2021-07-10 PROCEDURE — U0002 COVID-19 LAB TEST NON-CDC: HCPCS | Mod: QW,S$GLB,, | Performed by: FAMILY MEDICINE

## 2021-07-10 PROCEDURE — 99211 OFF/OP EST MAY X REQ PHY/QHP: CPT | Mod: S$GLB,,, | Performed by: FAMILY MEDICINE

## 2021-07-10 PROCEDURE — 99211 PR OFFICE/OUTPT VISIT, EST, LEVL I: ICD-10-PCS | Mod: S$GLB,,, | Performed by: FAMILY MEDICINE

## 2021-07-12 ENCOUNTER — HOSPITAL ENCOUNTER (OUTPATIENT)
Dept: RADIOLOGY | Facility: HOSPITAL | Age: 61
Discharge: HOME | End: 2021-07-12
Attending: OBSTETRICS & GYNECOLOGY
Payer: COMMERCIAL

## 2021-07-12 ENCOUNTER — OFFICE VISIT (OUTPATIENT)
Dept: OBSTETRICS AND GYNECOLOGY | Facility: CLINIC | Age: 61
End: 2021-07-12
Payer: COMMERCIAL

## 2021-07-12 VITALS
SYSTOLIC BLOOD PRESSURE: 122 MMHG | WEIGHT: 118.63 LBS | HEIGHT: 68 IN | BODY MASS INDEX: 17.98 KG/M2 | DIASTOLIC BLOOD PRESSURE: 80 MMHG

## 2021-07-12 DIAGNOSIS — Z12.31 VISIT FOR SCREENING MAMMOGRAM: ICD-10-CM

## 2021-07-12 DIAGNOSIS — F41.9 ANXIETY: ICD-10-CM

## 2021-07-12 DIAGNOSIS — A60.00 GENITAL HERPES SIMPLEX, UNSPECIFIED SITE: ICD-10-CM

## 2021-07-12 DIAGNOSIS — G47.9 SLEEP DISTURBANCE: ICD-10-CM

## 2021-07-12 DIAGNOSIS — Z01.419 ROUTINE GYNECOLOGICAL EXAMINATION: Primary | ICD-10-CM

## 2021-07-12 DIAGNOSIS — A60.9 HSV (HERPES SIMPLEX VIRUS) ANOGENITAL INFECTION: ICD-10-CM

## 2021-07-12 PROCEDURE — 99999 PR PBB SHADOW E&M-EST. PATIENT-LVL III: ICD-10-PCS | Mod: PBBFAC,,, | Performed by: OBSTETRICS & GYNECOLOGY

## 2021-07-12 PROCEDURE — 99396 PR PREVENTIVE VISIT,EST,40-64: ICD-10-PCS | Mod: S$GLB,,, | Performed by: OBSTETRICS & GYNECOLOGY

## 2021-07-12 PROCEDURE — 1126F PR PAIN SEVERITY QUANTIFIED, NO PAIN PRESENT: ICD-10-PCS | Mod: S$GLB,,, | Performed by: OBSTETRICS & GYNECOLOGY

## 2021-07-12 PROCEDURE — 77067 SCR MAMMO BI INCL CAD: CPT | Mod: TC,PN

## 2021-07-12 PROCEDURE — 1126F AMNT PAIN NOTED NONE PRSNT: CPT | Mod: S$GLB,,, | Performed by: OBSTETRICS & GYNECOLOGY

## 2021-07-12 PROCEDURE — 3008F BODY MASS INDEX DOCD: CPT | Mod: CPTII,S$GLB,, | Performed by: OBSTETRICS & GYNECOLOGY

## 2021-07-12 PROCEDURE — 88175 CYTOPATH C/V AUTO FLUID REDO: CPT | Performed by: OBSTETRICS & GYNECOLOGY

## 2021-07-12 PROCEDURE — 87624 HPV HI-RISK TYP POOLED RSLT: CPT | Performed by: OBSTETRICS & GYNECOLOGY

## 2021-07-12 PROCEDURE — 3008F PR BODY MASS INDEX (BMI) DOCUMENTED: ICD-10-PCS | Mod: CPTII,S$GLB,, | Performed by: OBSTETRICS & GYNECOLOGY

## 2021-07-12 PROCEDURE — 77067 SCR MAMMO BI INCL CAD: CPT | Mod: 26,,, | Performed by: RADIOLOGY

## 2021-07-12 PROCEDURE — 77063 MAMMO DIGITAL SCREENING BILAT WITH TOMO: ICD-10-PCS | Mod: 26,,, | Performed by: RADIOLOGY

## 2021-07-12 PROCEDURE — 77067 MAMMO DIGITAL SCREENING BILAT WITH TOMO: ICD-10-PCS | Mod: 26,,, | Performed by: RADIOLOGY

## 2021-07-12 PROCEDURE — 77063 BREAST TOMOSYNTHESIS BI: CPT | Mod: 26,,, | Performed by: RADIOLOGY

## 2021-07-12 PROCEDURE — 99999 PR PBB SHADOW E&M-EST. PATIENT-LVL III: CPT | Mod: PBBFAC,,, | Performed by: OBSTETRICS & GYNECOLOGY

## 2021-07-12 PROCEDURE — 99396 PREV VISIT EST AGE 40-64: CPT | Mod: S$GLB,,, | Performed by: OBSTETRICS & GYNECOLOGY

## 2021-07-12 RX ORDER — VALACYCLOVIR HYDROCHLORIDE 500 MG/1
500 TABLET, FILM COATED ORAL DAILY
Qty: 30 TABLET | Refills: 11 | Status: SHIPPED | OUTPATIENT
Start: 2021-07-12 | End: 2022-08-15

## 2021-07-12 RX ORDER — ESZOPICLONE 2 MG/1
2 TABLET, FILM COATED ORAL NIGHTLY PRN
Qty: 30 TABLET | Refills: 2 | Status: SHIPPED | OUTPATIENT
Start: 2021-07-12 | End: 2022-08-15 | Stop reason: SDUPTHER

## 2021-07-15 LAB
FINAL PATHOLOGIC DIAGNOSIS: NORMAL
Lab: NORMAL

## 2021-07-16 LAB
HEMOCCULT STL QL IA: NEGATIVE
HPV HR 12 DNA SPEC QL NAA+PROBE: NEGATIVE
HPV16 AG SPEC QL: NEGATIVE
HPV18 DNA SPEC QL NAA+PROBE: NEGATIVE

## 2022-02-09 ENCOUNTER — TELEPHONE (OUTPATIENT)
Dept: FAMILY MEDICINE | Facility: CLINIC | Age: 62
End: 2022-02-09
Payer: COMMERCIAL

## 2022-02-09 NOTE — TELEPHONE ENCOUNTER
----- Message from Bety Lujan, Patient Care Assistant sent at 2/9/2022  2:30 PM CST -----  Regarding: advice  Type: Needs Medical Advice  Who Called:  pt   Best Call Back Number: [phone] (home)   Additional Information: pt states she would like a callback regarding an injection. Thanks!

## 2022-02-09 NOTE — TELEPHONE ENCOUNTER
Spoke with pt regarding shingles vaccine, states she Is going to get it from an outside source. Verbalized understanding and informed pt to have the facility fax over proof once she has received the vaccine so I can update her chart. Pt verbalized understanding phone call ended.

## 2022-08-03 DIAGNOSIS — Z12.31 OTHER SCREENING MAMMOGRAM: ICD-10-CM

## 2022-08-08 ENCOUNTER — PATIENT MESSAGE (OUTPATIENT)
Dept: ADMINISTRATIVE | Facility: HOSPITAL | Age: 62
End: 2022-08-08
Payer: COMMERCIAL

## 2022-08-15 ENCOUNTER — HOSPITAL ENCOUNTER (OUTPATIENT)
Dept: RADIOLOGY | Facility: HOSPITAL | Age: 62
Discharge: HOME | End: 2022-08-15
Attending: FAMILY MEDICINE
Payer: COMMERCIAL

## 2022-08-15 ENCOUNTER — OFFICE VISIT (OUTPATIENT)
Dept: FAMILY MEDICINE | Facility: CLINIC | Age: 62
End: 2022-08-15
Payer: COMMERCIAL

## 2022-08-15 VITALS
RESPIRATION RATE: 18 BRPM | DIASTOLIC BLOOD PRESSURE: 70 MMHG | WEIGHT: 115.5 LBS | HEIGHT: 68 IN | BODY MASS INDEX: 17.5 KG/M2 | HEART RATE: 77 BPM | OXYGEN SATURATION: 97 % | SYSTOLIC BLOOD PRESSURE: 118 MMHG

## 2022-08-15 DIAGNOSIS — R10.33 ABDOMINAL PAIN, ACUTE, PERIUMBILICAL: ICD-10-CM

## 2022-08-15 DIAGNOSIS — F41.9 ANXIETY: ICD-10-CM

## 2022-08-15 DIAGNOSIS — I47.29 NSVT (NONSUSTAINED VENTRICULAR TACHYCARDIA): ICD-10-CM

## 2022-08-15 DIAGNOSIS — A60.9 HSV (HERPES SIMPLEX VIRUS) ANOGENITAL INFECTION: ICD-10-CM

## 2022-08-15 DIAGNOSIS — R14.0 ABDOMINAL BLOATING: ICD-10-CM

## 2022-08-15 DIAGNOSIS — A60.00 GENITAL HERPES SIMPLEX, UNSPECIFIED SITE: ICD-10-CM

## 2022-08-15 DIAGNOSIS — Z00.01 ANNUAL VISIT FOR GENERAL ADULT MEDICAL EXAMINATION WITH ABNORMAL FINDINGS: Primary | ICD-10-CM

## 2022-08-15 DIAGNOSIS — Z12.11 COLON CANCER SCREENING: ICD-10-CM

## 2022-08-15 DIAGNOSIS — Z12.31 ENCOUNTER FOR SCREENING MAMMOGRAM FOR MALIGNANT NEOPLASM OF BREAST: ICD-10-CM

## 2022-08-15 DIAGNOSIS — G47.9 SLEEP DISTURBANCE: ICD-10-CM

## 2022-08-15 PROCEDURE — 99999 PR PBB SHADOW E&M-EST. PATIENT-LVL V: CPT | Mod: PBBFAC,,, | Performed by: FAMILY MEDICINE

## 2022-08-15 PROCEDURE — 3074F SYST BP LT 130 MM HG: CPT | Mod: CPTII,S$GLB,, | Performed by: FAMILY MEDICINE

## 2022-08-15 PROCEDURE — 1160F RVW MEDS BY RX/DR IN RCRD: CPT | Mod: CPTII,S$GLB,, | Performed by: FAMILY MEDICINE

## 2022-08-15 PROCEDURE — 1160F PR REVIEW ALL MEDS BY PRESCRIBER/CLIN PHARMACIST DOCUMENTED: ICD-10-PCS | Mod: CPTII,S$GLB,, | Performed by: FAMILY MEDICINE

## 2022-08-15 PROCEDURE — 74018 XR ABDOMEN AP 1 VIEW: ICD-10-PCS | Mod: 26,,, | Performed by: RADIOLOGY

## 2022-08-15 PROCEDURE — 74018 RADEX ABDOMEN 1 VIEW: CPT | Mod: TC,FY,PO

## 2022-08-15 PROCEDURE — 3074F PR MOST RECENT SYSTOLIC BLOOD PRESSURE < 130 MM HG: ICD-10-PCS | Mod: CPTII,S$GLB,, | Performed by: FAMILY MEDICINE

## 2022-08-15 PROCEDURE — 99396 PREV VISIT EST AGE 40-64: CPT | Mod: S$GLB,,, | Performed by: FAMILY MEDICINE

## 2022-08-15 PROCEDURE — 3078F PR MOST RECENT DIASTOLIC BLOOD PRESSURE < 80 MM HG: ICD-10-PCS | Mod: CPTII,S$GLB,, | Performed by: FAMILY MEDICINE

## 2022-08-15 PROCEDURE — 3008F BODY MASS INDEX DOCD: CPT | Mod: CPTII,S$GLB,, | Performed by: FAMILY MEDICINE

## 2022-08-15 PROCEDURE — 3078F DIAST BP <80 MM HG: CPT | Mod: CPTII,S$GLB,, | Performed by: FAMILY MEDICINE

## 2022-08-15 PROCEDURE — 74018 RADEX ABDOMEN 1 VIEW: CPT | Mod: 26,,, | Performed by: RADIOLOGY

## 2022-08-15 PROCEDURE — 1159F PR MEDICATION LIST DOCUMENTED IN MEDICAL RECORD: ICD-10-PCS | Mod: CPTII,S$GLB,, | Performed by: FAMILY MEDICINE

## 2022-08-15 PROCEDURE — 99999 PR PBB SHADOW E&M-EST. PATIENT-LVL V: ICD-10-PCS | Mod: PBBFAC,,, | Performed by: FAMILY MEDICINE

## 2022-08-15 PROCEDURE — 3008F PR BODY MASS INDEX (BMI) DOCUMENTED: ICD-10-PCS | Mod: CPTII,S$GLB,, | Performed by: FAMILY MEDICINE

## 2022-08-15 PROCEDURE — 1159F MED LIST DOCD IN RCRD: CPT | Mod: CPTII,S$GLB,, | Performed by: FAMILY MEDICINE

## 2022-08-15 PROCEDURE — 99396 PR PREVENTIVE VISIT,EST,40-64: ICD-10-PCS | Mod: S$GLB,,, | Performed by: FAMILY MEDICINE

## 2022-08-15 RX ORDER — ALPRAZOLAM 0.25 MG/1
TABLET ORAL
Qty: 30 TABLET | Refills: 5 | Status: SHIPPED | OUTPATIENT
Start: 2022-08-15 | End: 2023-05-16 | Stop reason: SDUPTHER

## 2022-08-15 RX ORDER — VALACYCLOVIR HYDROCHLORIDE 500 MG/1
500 TABLET, FILM COATED ORAL DAILY
Qty: 90 TABLET | Refills: 3 | Status: SHIPPED | OUTPATIENT
Start: 2022-08-15 | End: 2023-08-15

## 2022-08-15 RX ORDER — ESZOPICLONE 2 MG/1
2 TABLET, FILM COATED ORAL NIGHTLY PRN
Qty: 30 TABLET | Refills: 2 | Status: SHIPPED | OUTPATIENT
Start: 2022-08-15 | End: 2022-11-07 | Stop reason: ALTCHOICE

## 2022-08-15 NOTE — PROGRESS NOTES
Assessment:       1. Annual visit for general adult medical examination with abnormal findings    2. Colon cancer screening    3. Encounter for screening mammogram for malignant neoplasm of breast    4. Abdominal bloating    5. Abdominal pain, acute, periumbilical    6. NSVT (nonsustained ventricular tachycardia)    7. Genital herpes simplex, unspecified site    8. HSV (herpes simplex virus) anogenital infection    9. Sleep disturbance    10. Anxiety        Plan:       Annual visit for general adult medical examination with abnormal findings  -     CBC Auto Differential; Future; Expected date: 08/15/2022  -     Comprehensive Metabolic Panel; Future; Expected date: 08/15/2022  -     Lipid Panel; Future; Expected date: 08/15/2022  -     TSH; Future; Expected date: 08/15/2022      Encounter for screening mammogram for malignant neoplasm of breast  -     Mammo Digital Screening Bilat; Future; Expected date: 08/15/2022    Abdominal bloating:  New problem workup needed  -     AMYLASE; Future; Expected date: 08/15/2022  -     LIPASE; Future; Expected date: 08/15/2022  -     Ambulatory referral/consult to Gastroenterology; Future; Expected date: 08/22/2022  -     X-Ray Abdomen AP 1 View; Future; Expected date: 08/15/2022    Abdominal pain, acute, periumbilical:  New problem workup needed  -     AMYLASE; Future; Expected date: 08/15/2022  -     LIPASE; Future; Expected date: 08/15/2022  -     Ambulatory referral/consult to Gastroenterology; Future; Expected date: 08/22/2022  -     X-Ray Abdomen AP 1 View; Future; Expected date: 08/15/2022    NSVT (nonsustained ventricular tachycardia):  Improved    Genital herpes simplex, unspecified site:  Stable  -     valACYclovir (VALTREX) 500 MG tablet; Take 1 tablet (500 mg total) by mouth once daily.  Dispense: 90 tablet; Refill: 3    HSV (herpes simplex virus) anogenital infection:  Stable  -     valACYclovir (VALTREX) 500 MG tablet; Take 1 tablet (500 mg total) by mouth once daily.   Dispense: 90 tablet; Refill: 3    Sleep disturbance:  Stable  -     eszopiclone (LUNESTA) 2 MG Tab; Take 1 tablet (2 mg total) by mouth nightly as needed (insomnia).  Dispense: 30 tablet; Refill: 2    Anxiety:  Stable  -     eszopiclone (LUNESTA) 2 MG Tab; Take 1 tablet (2 mg total) by mouth nightly as needed (insomnia).  Dispense: 30 tablet; Refill: 2  -     ALPRAZolam (XANAX) 0.25 MG tablet; TAKE 1 TABLET(0.25 MG) BY MOUTH EVERY NIGHT AS NEEDED FOR INSOMNIA OR ANXIETY  Dispense: 30 tablet; Refill: 5      [place] prescription monitoring program was checked and okay, will refer the patient to the GI specialist secondary to symptoms of abdominal pain and the patient is in need for colonoscopy as soon as possible.    Patient agreed with assessment and plan. Patient verbalized understanding.     Subjective:       Patient ID: Mely Viera is a 61 y.o. female.    Chief Complaint: Annual Exam (X1 week bloated and pressure possibly bladder or colon pressure, no vomiting, positive nausea. BM today. Headaches have increased. )    HPI     The patient is coming here today for an annual checkup, the patient is complaining of feeling abdominal bloating, abdominal pain localized on the periumbilical area, feeling nauseated, the patient stated that her headaches are increase, she is having bowel movements daily, she is taking also laxative.  The patient is also due for colonoscopy.  She also has anxiety and insomnia and she needs a prescription for Lunesta and alprazolam, she does not take this medicines on a daily basis, the last time that was filled it was in the beginning of this year.    Past medical history, past social history was reviewed and discussed with the patient.    Review of Systems   Constitutional: Negative for activity change, appetite change and chills.   HENT: Negative for congestion and ear discharge.    Eyes: Negative for discharge and itching.   Respiratory: Negative for choking and chest tightness.     Cardiovascular: Negative for chest pain, palpitations and leg swelling.   Gastrointestinal: Positive for abdominal distention, abdominal pain and nausea.   Endocrine: Negative for cold intolerance and heat intolerance.   Genitourinary: Negative for dysuria and flank pain.   Musculoskeletal: Negative for arthralgias and back pain.   Skin: Negative for pallor and rash.   Allergic/Immunologic: Negative for environmental allergies and food allergies.   Neurological: Negative for dizziness, facial asymmetry and headaches.   Hematological: Negative for adenopathy. Does not bruise/bleed easily.   Psychiatric/Behavioral: Positive for sleep disturbance. Negative for agitation and confusion. The patient is nervous/anxious.        Objective:      Physical Exam  Vitals and nursing note reviewed.   Constitutional:       General: She is not in acute distress.     Appearance: Normal appearance. She is well-developed. She is not diaphoretic.   HENT:      Head: Normocephalic and atraumatic.      Right Ear: External ear normal.      Left Ear: External ear normal.      Nose: Nose normal.      Mouth/Throat:      Pharynx: No oropharyngeal exudate.   Eyes:      General: No scleral icterus.        Right eye: No discharge.         Left eye: No discharge.      Conjunctiva/sclera: Conjunctivae normal.      Pupils: Pupils are equal, round, and reactive to light.   Cardiovascular:      Rate and Rhythm: Normal rate and regular rhythm.      Heart sounds: Normal heart sounds.   Pulmonary:      Effort: Pulmonary effort is normal. No respiratory distress.      Breath sounds: Normal breath sounds. No wheezing.   Abdominal:      General: Abdomen is flat. Bowel sounds are normal. There is distension.      Tenderness: There is abdominal tenderness (Periumbilical area).   Musculoskeletal:         General: No tenderness or deformity.      Cervical back: Neck supple.   Skin:     Coloration: Skin is not pale.      Findings: No erythema.   Neurological:       Mental Status: She is alert.      Cranial Nerves: No cranial nerve deficit.      Coordination: Coordination normal.   Psychiatric:         Behavior: Behavior normal.         Thought Content: Thought content normal.         Judgment: Judgment normal.

## 2022-08-16 ENCOUNTER — LAB VISIT (OUTPATIENT)
Dept: LAB | Facility: HOSPITAL | Age: 62
End: 2022-08-16
Attending: FAMILY MEDICINE
Payer: COMMERCIAL

## 2022-08-16 ENCOUNTER — TELEPHONE (OUTPATIENT)
Dept: FAMILY MEDICINE | Facility: CLINIC | Age: 62
End: 2022-08-16
Payer: COMMERCIAL

## 2022-08-16 DIAGNOSIS — R14.0 ABDOMINAL BLOATING: ICD-10-CM

## 2022-08-16 DIAGNOSIS — R10.33 ABDOMINAL PAIN, ACUTE, PERIUMBILICAL: ICD-10-CM

## 2022-08-16 DIAGNOSIS — Z00.01 ANNUAL VISIT FOR GENERAL ADULT MEDICAL EXAMINATION WITH ABNORMAL FINDINGS: ICD-10-CM

## 2022-08-16 LAB
ALBUMIN SERPL BCP-MCNC: 4.2 G/DL (ref 3.5–5.2)
ALP SERPL-CCNC: 75 U/L (ref 55–135)
ALT SERPL W/O P-5'-P-CCNC: 16 U/L (ref 10–44)
AMYLASE SERPL-CCNC: 82 U/L (ref 20–110)
ANION GAP SERPL CALC-SCNC: 9 MMOL/L (ref 8–16)
AST SERPL-CCNC: 20 U/L (ref 10–40)
BASOPHILS # BLD AUTO: 0.08 K/UL (ref 0–0.2)
BASOPHILS NFR BLD: 1.3 % (ref 0–1.9)
BILIRUB SERPL-MCNC: 1 MG/DL (ref 0.1–1)
BUN SERPL-MCNC: 14 MG/DL (ref 6–20)
CALCIUM SERPL-MCNC: 9.5 MG/DL (ref 8.7–10.5)
CHLORIDE SERPL-SCNC: 103 MMOL/L (ref 95–110)
CHOLEST SERPL-MCNC: 179 MG/DL (ref 120–199)
CHOLEST/HDLC SERPL: 4.3 {RATIO} (ref 2–5)
CO2 SERPL-SCNC: 28 MMOL/L (ref 23–29)
CREAT SERPL-MCNC: 0.8 MG/DL (ref 0.5–1.4)
DIFFERENTIAL METHOD: ABNORMAL
EOSINOPHIL # BLD AUTO: 0.9 K/UL (ref 0–0.5)
EOSINOPHIL NFR BLD: 14.7 % (ref 0–8)
ERYTHROCYTE [DISTWIDTH] IN BLOOD BY AUTOMATED COUNT: 13 % (ref 11.5–14.5)
EST. GFR  (NO RACE VARIABLE): >60 ML/MIN/1.73 M^2
GLUCOSE SERPL-MCNC: 103 MG/DL (ref 70–110)
HCT VFR BLD AUTO: 41.9 % (ref 37–48.5)
HDLC SERPL-MCNC: 42 MG/DL (ref 40–75)
HDLC SERPL: 23.5 % (ref 20–50)
HGB BLD-MCNC: 14.2 G/DL (ref 12–16)
IMM GRANULOCYTES # BLD AUTO: 0.01 K/UL (ref 0–0.04)
IMM GRANULOCYTES NFR BLD AUTO: 0.2 % (ref 0–0.5)
LDLC SERPL CALC-MCNC: 105.8 MG/DL (ref 63–159)
LIPASE SERPL-CCNC: 35 U/L (ref 4–60)
LYMPHOCYTES # BLD AUTO: 1.9 K/UL (ref 1–4.8)
LYMPHOCYTES NFR BLD: 29.5 % (ref 18–48)
MCH RBC QN AUTO: 32.3 PG (ref 27–31)
MCHC RBC AUTO-ENTMCNC: 33.9 G/DL (ref 32–36)
MCV RBC AUTO: 95 FL (ref 82–98)
MONOCYTES # BLD AUTO: 0.4 K/UL (ref 0.3–1)
MONOCYTES NFR BLD: 6.2 % (ref 4–15)
NEUTROPHILS # BLD AUTO: 3 K/UL (ref 1.8–7.7)
NEUTROPHILS NFR BLD: 48.1 % (ref 38–73)
NONHDLC SERPL-MCNC: 137 MG/DL
NRBC BLD-RTO: 0 /100 WBC
PLATELET # BLD AUTO: 222 K/UL (ref 150–450)
PMV BLD AUTO: 11.1 FL (ref 9.2–12.9)
POTASSIUM SERPL-SCNC: 4.1 MMOL/L (ref 3.5–5.1)
PROT SERPL-MCNC: 7.4 G/DL (ref 6–8.4)
RBC # BLD AUTO: 4.4 M/UL (ref 4–5.4)
SODIUM SERPL-SCNC: 140 MMOL/L (ref 136–145)
TRIGL SERPL-MCNC: 156 MG/DL (ref 30–150)
TSH SERPL DL<=0.005 MIU/L-ACNC: 2.64 UIU/ML (ref 0.4–4)
WBC # BLD AUTO: 6.31 K/UL (ref 3.9–12.7)

## 2022-08-16 PROCEDURE — 80061 LIPID PANEL: CPT | Performed by: FAMILY MEDICINE

## 2022-08-16 PROCEDURE — 83690 ASSAY OF LIPASE: CPT | Performed by: FAMILY MEDICINE

## 2022-08-16 PROCEDURE — 85025 COMPLETE CBC W/AUTO DIFF WBC: CPT | Performed by: FAMILY MEDICINE

## 2022-08-16 PROCEDURE — 82150 ASSAY OF AMYLASE: CPT | Performed by: FAMILY MEDICINE

## 2022-08-16 PROCEDURE — 36415 COLL VENOUS BLD VENIPUNCTURE: CPT | Mod: PO | Performed by: FAMILY MEDICINE

## 2022-08-16 PROCEDURE — 80053 COMPREHEN METABOLIC PANEL: CPT | Performed by: FAMILY MEDICINE

## 2022-08-16 PROCEDURE — 84443 ASSAY THYROID STIM HORMONE: CPT | Performed by: FAMILY MEDICINE

## 2022-08-16 NOTE — TELEPHONE ENCOUNTER
----- Message from Lay Main sent at 8/16/2022  4:02 PM CDT -----  Contact: Patient  Type: Patient Call Back         Who called: Patient         What is the request in detail: regarding the Rx that was sent in for linaCLOtide (LINZESS) 145 mcg Cap capsule; states it was for $400; requesting assistance to get it cheaper; please advise          Can the clinic reply by [facility]? yes         Would the patient rather a call back or a response via [facility]? either         Best call back number: [phone]         Additional Information:   [id number] - [place] [address] AT [facility] [address] [address]  Phone: [phone] Fax: [phone]             Thank You

## 2022-08-17 RX ORDER — POLYETHYLENE GLYCOL 3350 17 G/17G
17 POWDER, FOR SOLUTION ORAL DAILY
Qty: 850 G | Refills: 0 | Status: SHIPPED | OUTPATIENT
Start: 2022-08-17 | End: 2022-09-02

## 2022-08-24 ENCOUNTER — PATIENT MESSAGE (OUTPATIENT)
Dept: ADMINISTRATIVE | Facility: HOSPITAL | Age: 62
End: 2022-08-24
Payer: COMMERCIAL

## 2022-08-28 ENCOUNTER — PATIENT MESSAGE (OUTPATIENT)
Dept: FAMILY MEDICINE | Facility: CLINIC | Age: 62
End: 2022-08-28
Payer: COMMERCIAL

## 2022-08-28 NOTE — TELEPHONE ENCOUNTER
Patient was in the er for abdominal issues she have an appointment with gi on 9/9. You want her to follow up with gi first and then you since she is being seen for that.

## 2022-08-29 ENCOUNTER — PATIENT MESSAGE (OUTPATIENT)
Dept: FAMILY MEDICINE | Facility: CLINIC | Age: 62
End: 2022-08-29
Payer: COMMERCIAL

## 2022-08-29 NOTE — TELEPHONE ENCOUNTER
Please can you schedule the patient an appointment for a follow-up ER, if I do not have anything available with somebody in the team.  Thank you.

## 2022-08-30 ENCOUNTER — PATIENT MESSAGE (OUTPATIENT)
Dept: FAMILY MEDICINE | Facility: CLINIC | Age: 62
End: 2022-08-30
Payer: COMMERCIAL

## 2022-09-02 ENCOUNTER — OFFICE VISIT (OUTPATIENT)
Dept: FAMILY MEDICINE | Facility: CLINIC | Age: 62
End: 2022-09-02
Payer: COMMERCIAL

## 2022-09-02 ENCOUNTER — TELEPHONE (OUTPATIENT)
Dept: OBSTETRICS AND GYNECOLOGY | Facility: CLINIC | Age: 62
End: 2022-09-02
Payer: COMMERCIAL

## 2022-09-02 VITALS
BODY MASS INDEX: 18.02 KG/M2 | WEIGHT: 115.06 LBS | HEART RATE: 77 BPM | OXYGEN SATURATION: 99 % | SYSTOLIC BLOOD PRESSURE: 118 MMHG | DIASTOLIC BLOOD PRESSURE: 78 MMHG

## 2022-09-02 DIAGNOSIS — H61.21 HEARING LOSS OF RIGHT EAR DUE TO CERUMEN IMPACTION: ICD-10-CM

## 2022-09-02 DIAGNOSIS — F41.9 ANXIETY: ICD-10-CM

## 2022-09-02 DIAGNOSIS — R42 LIGHTHEADED: ICD-10-CM

## 2022-09-02 DIAGNOSIS — R42 DIZZINESS: Primary | ICD-10-CM

## 2022-09-02 DIAGNOSIS — R19.8 CHANGE IN BOWEL MOVEMENT: ICD-10-CM

## 2022-09-02 DIAGNOSIS — R94.31 ABNORMAL EKG: ICD-10-CM

## 2022-09-02 DIAGNOSIS — H93.11 TINNITUS OF RIGHT EAR: ICD-10-CM

## 2022-09-02 PROCEDURE — 3074F PR MOST RECENT SYSTOLIC BLOOD PRESSURE < 130 MM HG: ICD-10-PCS | Mod: CPTII,S$GLB,, | Performed by: FAMILY MEDICINE

## 2022-09-02 PROCEDURE — 3008F BODY MASS INDEX DOCD: CPT | Mod: CPTII,S$GLB,, | Performed by: FAMILY MEDICINE

## 2022-09-02 PROCEDURE — 99999 PR PBB SHADOW E&M-EST. PATIENT-LVL IV: CPT | Mod: PBBFAC,,, | Performed by: FAMILY MEDICINE

## 2022-09-02 PROCEDURE — 3074F SYST BP LT 130 MM HG: CPT | Mod: CPTII,S$GLB,, | Performed by: FAMILY MEDICINE

## 2022-09-02 PROCEDURE — 99214 PR OFFICE/OUTPT VISIT, EST, LEVL IV, 30-39 MIN: ICD-10-PCS | Mod: S$GLB,,, | Performed by: FAMILY MEDICINE

## 2022-09-02 PROCEDURE — 99999 PR PBB SHADOW E&M-EST. PATIENT-LVL IV: ICD-10-PCS | Mod: PBBFAC,,, | Performed by: FAMILY MEDICINE

## 2022-09-02 PROCEDURE — 99214 OFFICE O/P EST MOD 30 MIN: CPT | Mod: S$GLB,,, | Performed by: FAMILY MEDICINE

## 2022-09-02 PROCEDURE — 3008F PR BODY MASS INDEX (BMI) DOCUMENTED: ICD-10-PCS | Mod: CPTII,S$GLB,, | Performed by: FAMILY MEDICINE

## 2022-09-02 PROCEDURE — 3078F DIAST BP <80 MM HG: CPT | Mod: CPTII,S$GLB,, | Performed by: FAMILY MEDICINE

## 2022-09-02 PROCEDURE — 3078F PR MOST RECENT DIASTOLIC BLOOD PRESSURE < 80 MM HG: ICD-10-PCS | Mod: CPTII,S$GLB,, | Performed by: FAMILY MEDICINE

## 2022-09-02 RX ORDER — MECLIZINE HYDROCHLORIDE 25 MG/1
25 TABLET ORAL 3 TIMES DAILY PRN
Qty: 30 TABLET | Refills: 0 | Status: SHIPPED | OUTPATIENT
Start: 2022-09-02 | End: 2022-12-02

## 2022-09-02 NOTE — TELEPHONE ENCOUNTER
----- Message from Mireya Alex sent at 9/2/2022 12:36 PM CDT -----  Contact: pt  Type:  Sooner Appointment Request    Caller is requesting a sooner appointment.  Caller declined first available appointment listed below.  Caller will not accept being placed on the waitlist and is requesting a message be sent to doctor.    Name of Caller:  pt  When is the first available appointment?  none  Symptoms:  has a bulge coming out of vagina  Best Call Back Number:  [phone]    Additional Information:  pt states she has a bulge coming out of vagina

## 2022-09-06 ENCOUNTER — OFFICE VISIT (OUTPATIENT)
Dept: OBSTETRICS AND GYNECOLOGY | Facility: CLINIC | Age: 62
End: 2022-09-06
Payer: COMMERCIAL

## 2022-09-06 VITALS
HEIGHT: 67 IN | BODY MASS INDEX: 18.27 KG/M2 | WEIGHT: 116.38 LBS | RESPIRATION RATE: 16 BRPM | SYSTOLIC BLOOD PRESSURE: 122 MMHG | DIASTOLIC BLOOD PRESSURE: 66 MMHG

## 2022-09-06 DIAGNOSIS — N81.10 CYSTOCELE WITH RECTOCELE: ICD-10-CM

## 2022-09-06 DIAGNOSIS — Z01.419 ROUTINE GYNECOLOGICAL EXAMINATION: Primary | ICD-10-CM

## 2022-09-06 DIAGNOSIS — N81.6 CYSTOCELE WITH RECTOCELE: ICD-10-CM

## 2022-09-06 PROCEDURE — 3074F SYST BP LT 130 MM HG: CPT | Mod: CPTII,S$GLB,, | Performed by: OBSTETRICS & GYNECOLOGY

## 2022-09-06 PROCEDURE — 99999 PR PBB SHADOW E&M-EST. PATIENT-LVL III: CPT | Mod: PBBFAC,,, | Performed by: OBSTETRICS & GYNECOLOGY

## 2022-09-06 PROCEDURE — 3074F PR MOST RECENT SYSTOLIC BLOOD PRESSURE < 130 MM HG: ICD-10-PCS | Mod: CPTII,S$GLB,, | Performed by: OBSTETRICS & GYNECOLOGY

## 2022-09-06 PROCEDURE — 99999 PR PBB SHADOW E&M-EST. PATIENT-LVL III: ICD-10-PCS | Mod: PBBFAC,,, | Performed by: OBSTETRICS & GYNECOLOGY

## 2022-09-06 PROCEDURE — 99396 PREV VISIT EST AGE 40-64: CPT | Mod: S$GLB,,, | Performed by: OBSTETRICS & GYNECOLOGY

## 2022-09-06 PROCEDURE — 99396 PR PREVENTIVE VISIT,EST,40-64: ICD-10-PCS | Mod: S$GLB,,, | Performed by: OBSTETRICS & GYNECOLOGY

## 2022-09-06 PROCEDURE — 1159F MED LIST DOCD IN RCRD: CPT | Mod: CPTII,S$GLB,, | Performed by: OBSTETRICS & GYNECOLOGY

## 2022-09-06 PROCEDURE — 1159F PR MEDICATION LIST DOCUMENTED IN MEDICAL RECORD: ICD-10-PCS | Mod: CPTII,S$GLB,, | Performed by: OBSTETRICS & GYNECOLOGY

## 2022-09-06 PROCEDURE — 3078F DIAST BP <80 MM HG: CPT | Mod: CPTII,S$GLB,, | Performed by: OBSTETRICS & GYNECOLOGY

## 2022-09-06 PROCEDURE — 3078F PR MOST RECENT DIASTOLIC BLOOD PRESSURE < 80 MM HG: ICD-10-PCS | Mod: CPTII,S$GLB,, | Performed by: OBSTETRICS & GYNECOLOGY

## 2022-09-06 PROCEDURE — 3008F BODY MASS INDEX DOCD: CPT | Mod: CPTII,S$GLB,, | Performed by: OBSTETRICS & GYNECOLOGY

## 2022-09-06 PROCEDURE — 3008F PR BODY MASS INDEX (BMI) DOCUMENTED: ICD-10-PCS | Mod: CPTII,S$GLB,, | Performed by: OBSTETRICS & GYNECOLOGY

## 2022-09-06 NOTE — PROGRESS NOTES
Chief Complaint   Patient presents with    Well Woman    Possible Prolapse       History of Present Illness: Mely Viera is a 61 y.o. female that presents today 9/6/2022 for well gyn visit.    Past Medical History:   Diagnosis Date    Anxiety     Depression     Heat stroke     HSV (herpes simplex virus) infection     Sleep disturbance        Past Surgical History:   Procedure Laterality Date    NONE         Current Outpatient Medications   Medication Sig Dispense Refill    ALPRAZolam (XANAX) 0.25 MG tablet TAKE 1 TABLET(0.25 MG) BY MOUTH EVERY NIGHT AS NEEDED FOR INSOMNIA OR ANXIETY 30 tablet 5    eszopiclone (LUNESTA) 2 MG Tab Take 1 tablet (2 mg total) by mouth nightly as needed (insomnia). 30 tablet 2    meclizine (ANTIVERT) 25 mg tablet Take 1 tablet (25 mg total) by mouth 3 (three) times daily as needed for Dizziness. 30 tablet 0    valACYclovir (VALTREX) 500 MG tablet Take 1 tablet (500 mg total) by mouth once daily. 90 tablet 3     No current facility-administered medications for this visit.       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   Tobacco Use    Smoking status: Never    Smokeless tobacco: Never   Substance and Sexual Activity    Alcohol use: Yes     Alcohol/week: 2.0 standard drinks     Types: 2 Glasses of wine per week     Comment: less than once a week    Drug use: No    Sexual activity: Not Currently     Partners: Male     Birth control/protection: None,  Post-menopausal     Social Determinants of Health     Financial Resource Strain: Low Risk     Difficulty of Paying Living Expenses: Not very hard   Food Insecurity: No Food Insecurity    Worried About Running Out of Food in the Last Year: Never true    Ran Out of Food in the Last Year: Never true   Transportation Needs: No Transportation Needs    Lack of Transportation (Medical): No    Lack of Transportation (Non-Medical): No   Physical Activity: Insufficiently Active    Days of Exercise per Week: 2 days    Minutes of Exercise per Session: 60 min   Stress: Stress Concern Present    Feeling of Stress : Very much   Social Connections: Unknown    Frequency of Communication with Friends and Family: More than three times a week    Frequency of Social Gatherings with Friends and Family: Three times a week    Active Member of Clubs or Organizations: Yes    Attends Club or Organization Meetings: More than 4 times per year    Marital Status:    Housing Stability: Low Risk     Unable to Pay for Housing in the Last Year: No    Number of Places Lived in the Last Year: 1    Unstable Housing in the Last Year: No       OB History    Para Term  AB Living   1 1 1     1   SAB IAB Ectopic Multiple Live Births           1      # Outcome Date GA Lbr Ruddy/2nd Weight Sex Delivery Anes PTL Lv   1 Term 10/09/13   3.289 kg (7 lb 4 oz) F Vag-Spont EPI N DARON       Review of Symptoms:  GENERAL: Denies weight gain or weight loss. Feeling well overall.   SKIN: Denies rash or lesions.   HEAD: Denies head injury or headache.   NODES: Denies enlarged lymph nodes.   CHEST: Denies chest pain or shortness of breath.   CARDIOVASCULAR: Denies palpitations or left sided chest pain.   ABDOMEN: No abdominal pain, constipation, diarrhea, nausea, vomiting or rectal bleeding.   URINARY: No frequency, dysuria, hematuria, or burning on urination.  HEMATOLOGIC: No easy bruisability or excessive bleeding.   MUSCULOSKELETAL: Denies joint pain  "or swelling.     /66   Resp 16   Ht 5' 7" (1.702 m)   Wt 52.8 kg (116 lb 6.5 oz)   LMP 08/11/2014   Physical Exam:  APPEARANCE: Well nourished, well developed, in no acute distress.  SKIN: Normal skin turgor, no lesions.  NECK: Neck symmetric without masses   RESPIRATORY: Normal respiratory effort with no retractions or use of accessory muscles  CARDIOVASCULAR: Peripheral vascular system with no swelling no varicosities and palpation of pulses normal  LYMPHATIC: No enlargements of the lymph nodes noted in the neck, axillae, or groin  ABDOMEN: Soft. No tenderness or masses. No hepatosplenomegaly. No hernias.  BREASTS: Symmetrical, no skin changes or visible lesions. No palpable masses, nipple discharge or adenopathy bilaterally.  PELVIC: Normal external female genitalia without lesions. Normal hair distribution. Adequate perineal body, normal urethral meatus. Urethra with no masses.  Bladder nontender. Vagina moist and well rugated without lesions or discharge. Cervix pink and without lesions. Grade 1-2 cystocele grade 1 rectocele. Bimanual exam showed uterus normal size, shape, position, mobile and nontender. Adnexa without masses or tenderness. Urethra and bladder normal.   EXTREMITIES: No clubbing cyanosis or edema.    ASSESSMENT/PLAN:  Routine gynecological examination    Cystocele with rectocele  -     Ambulatory referral/consult to Physical/Occupational Therapy; Future; Expected date: 09/13/2022        Patient was counseled today on Pelvic exams and Pap Smear guidelines.   We discussed STD screening if at high risk for a STD.  We discussed recommendation for breast cancer screening with mammogram every other year after the age of 40 and annually after the age of 50.    We discussed colon cancer screening when indicated.   Osteoporosis screening discussed when indicated.   She was advised to see her primary care physician for all other health maintenance.     FOLLOW-UP with me for next routine visit. "

## 2022-09-08 NOTE — PROGRESS NOTES
Assessment:       1. Dizziness    2. Lightheaded    3. Abnormal EKG    4. Change in bowel movement    5. Anxiety    6. Hearing loss of right ear due to cerumen impaction    7. Tinnitus of right ear          Plan:       Dizziness:  New problem workup needed  -     Cancel: Ambulatory referral/consult to Cardiology; Future; Expected date: 09/09/2022  -     Ambulatory referral/consult to Cardiology; Future; Expected date: 09/09/2022  -     meclizine (ANTIVERT) 25 mg tablet; Take 1 tablet (25 mg total) by mouth 3 (three) times daily as needed for Dizziness.  Dispense: 30 tablet; Refill: 0    Lightheaded: New problem workup needed  -     Cancel: Ambulatory referral/consult to Cardiology; Future; Expected date: 09/09/2022  -     Ambulatory referral/consult to Cardiology; Future; Expected date: 09/09/2022    Abnormal EKG: New problem workup needed  -     Cancel: Ambulatory referral/consult to Cardiology; Future; Expected date: 09/09/2022  -     Ambulatory referral/consult to Cardiology; Future; Expected date: 09/09/2022    Change in bowel movement: New problem workup needed    Anxiety:  Worsening    Hearing loss of right ear due to cerumen impaction: New problem workup needed  -     Ambulatory referral/consult to ENT; Future; Expected date: 09/09/2022    Tinnitus of right ear: New problem workup needed  -     Ambulatory referral/consult to ENT; Future; Expected date: 09/09/2022       Will refer the patient to the cardiologist soon as possible secondary to abnormal EKG.  Patient has an appointment to see the GI specialist secondary to weight loss and abnormal bowel movements.  Will refer the patient to the ENT specialist secondary to cerumen impaction and decreased hearing on the right ear.  The patient was recommended to continue to drink plenty water, take probiotics over-the-counter.  If the symptoms get worse, the patient notify us immediately.  ER warning signs given to the patient.   Patient agreed with assessment and  plan. Patient verbalized understanding.     Subjective:       Patient ID: Mely Viera is a 61 y.o. female.    Chief Complaint: Hospital Follow Up    HPI    Dizziness:  The patient went to the emergency room secondary to feeling lightheaded, dizziness and having stomach problems.  The patient stated that she feels dizzy like a she is going to passed out, lightheaded, the EKG was abnormal, the patient did not see a cardiologist for a while.  The patient stated that she usually drinks a lot a water.    Anxiety:  Currently taking Xanax, she does not need refills of the medication, the patient complains of decreased hearing on the right ear and presence of tinnitus.    Changes in the bowel movements:  Patient complains of episode of diarrhea and also constipation, went to the ER secondary to problems with MiraLax after taking for a few days does felt severe diarrhea.  The patient is having an appointment to see the gastroenterologist soon.    Past medical history, past social history was reviewed and discussed with the patient.    Review of Systems   Constitutional:  Positive for activity change, fatigue and unexpected weight change. Negative for appetite change and chills.   HENT:  Negative for congestion and ear discharge.    Eyes:  Negative for discharge and itching.   Respiratory:  Negative for choking and chest tightness.    Cardiovascular:  Negative for chest pain and leg swelling.   Gastrointestinal:  Positive for constipation and diarrhea. Negative for abdominal distention and abdominal pain.   Endocrine: Negative for cold intolerance and heat intolerance.   Genitourinary:  Negative for dysuria and flank pain.   Musculoskeletal:  Negative for arthralgias and back pain.   Skin:  Negative for pallor and rash.   Allergic/Immunologic: Negative for environmental allergies and food allergies.   Neurological:  Positive for dizziness and light-headedness. Negative for facial asymmetry and headaches.   Hematological:   Negative for adenopathy. Does not bruise/bleed easily.   Psychiatric/Behavioral:  Positive for dysphoric mood. Negative for agitation, confusion and decreased concentration. The patient is nervous/anxious.      Objective:      Physical Exam  Vitals and nursing note reviewed.   Constitutional:       General: She is not in acute distress.     Appearance: Normal appearance. She is well-developed. She is not ill-appearing or diaphoretic.   HENT:      Head: Normocephalic and atraumatic.      Right Ear: External ear normal. There is impacted cerumen.      Left Ear: External ear normal. There is no impacted cerumen.      Nose: Nose normal.      Mouth/Throat:      Pharynx: No oropharyngeal exudate.   Eyes:      General: No scleral icterus.        Right eye: No discharge.         Left eye: No discharge.      Conjunctiva/sclera: Conjunctivae normal.      Pupils: Pupils are equal, round, and reactive to light.   Cardiovascular:      Rate and Rhythm: Normal rate and regular rhythm.      Heart sounds: Normal heart sounds.   Pulmonary:      Effort: Pulmonary effort is normal. No respiratory distress.      Breath sounds: Normal breath sounds. No wheezing.   Abdominal:      General: Abdomen is flat. Bowel sounds are normal. There is distension.      Palpations: There is no mass.      Tenderness: There is no abdominal tenderness. There is no guarding.   Musculoskeletal:         General: No tenderness or deformity.      Cervical back: Neck supple.   Skin:     Coloration: Skin is not pale.   Neurological:      Mental Status: She is alert.      Cranial Nerves: No cranial nerve deficit.      Coordination: Coordination normal.   Psychiatric:         Behavior: Behavior normal.         Thought Content: Thought content normal.         Judgment: Judgment normal.

## 2022-09-09 ENCOUNTER — OFFICE VISIT (OUTPATIENT)
Dept: CARDIOLOGY | Facility: CLINIC | Age: 62
End: 2022-09-09
Payer: COMMERCIAL

## 2022-09-09 ENCOUNTER — OFFICE VISIT (OUTPATIENT)
Dept: GASTROENTEROLOGY | Facility: CLINIC | Age: 62
End: 2022-09-09
Payer: COMMERCIAL

## 2022-09-09 VITALS
DIASTOLIC BLOOD PRESSURE: 77 MMHG | BODY MASS INDEX: 18.41 KG/M2 | SYSTOLIC BLOOD PRESSURE: 130 MMHG | HEIGHT: 67 IN | HEART RATE: 71 BPM | WEIGHT: 117.31 LBS

## 2022-09-09 VITALS — WEIGHT: 116.38 LBS | HEIGHT: 67 IN | BODY MASS INDEX: 18.27 KG/M2

## 2022-09-09 DIAGNOSIS — R14.0 ABDOMINAL BLOATING: ICD-10-CM

## 2022-09-09 DIAGNOSIS — R07.9 NONSPECIFIC CHEST PAIN: ICD-10-CM

## 2022-09-09 DIAGNOSIS — K92.1 BLACK STOOL: ICD-10-CM

## 2022-09-09 DIAGNOSIS — R11.0 NAUSEA: ICD-10-CM

## 2022-09-09 DIAGNOSIS — R42 DIZZINESS: ICD-10-CM

## 2022-09-09 DIAGNOSIS — R00.2 PALPITATIONS: ICD-10-CM

## 2022-09-09 DIAGNOSIS — Z87.898 HISTORY OF ABDOMINAL PAIN: ICD-10-CM

## 2022-09-09 DIAGNOSIS — Z87.898 HISTORY OF SHORTNESS OF BREATH: ICD-10-CM

## 2022-09-09 DIAGNOSIS — R63.4 WEIGHT LOSS: Primary | ICD-10-CM

## 2022-09-09 DIAGNOSIS — R55 SYNCOPE AND COLLAPSE: Primary | ICD-10-CM

## 2022-09-09 DIAGNOSIS — R94.31 ABNORMAL EKG: ICD-10-CM

## 2022-09-09 DIAGNOSIS — R42 LIGHTHEADED: ICD-10-CM

## 2022-09-09 DIAGNOSIS — Z83.79 FAMILY HISTORY OF CROHN'S DISEASE: ICD-10-CM

## 2022-09-09 PROCEDURE — 99204 PR OFFICE/OUTPT VISIT, NEW, LEVL IV, 45-59 MIN: ICD-10-PCS | Mod: S$GLB,,, | Performed by: NURSE PRACTITIONER

## 2022-09-09 PROCEDURE — 99999 PR PBB SHADOW E&M-EST. PATIENT-LVL IV: CPT | Mod: PBBFAC,,, | Performed by: NURSE PRACTITIONER

## 2022-09-09 PROCEDURE — 3075F SYST BP GE 130 - 139MM HG: CPT | Mod: CPTII,S$GLB,, | Performed by: INTERNAL MEDICINE

## 2022-09-09 PROCEDURE — 3078F PR MOST RECENT DIASTOLIC BLOOD PRESSURE < 80 MM HG: ICD-10-PCS | Mod: CPTII,S$GLB,, | Performed by: INTERNAL MEDICINE

## 2022-09-09 PROCEDURE — 3008F BODY MASS INDEX DOCD: CPT | Mod: CPTII,S$GLB,, | Performed by: NURSE PRACTITIONER

## 2022-09-09 PROCEDURE — 99204 PR OFFICE/OUTPT VISIT, NEW, LEVL IV, 45-59 MIN: ICD-10-PCS | Mod: S$GLB,,, | Performed by: INTERNAL MEDICINE

## 2022-09-09 PROCEDURE — 3008F PR BODY MASS INDEX (BMI) DOCUMENTED: ICD-10-PCS | Mod: CPTII,S$GLB,, | Performed by: INTERNAL MEDICINE

## 2022-09-09 PROCEDURE — 3008F PR BODY MASS INDEX (BMI) DOCUMENTED: ICD-10-PCS | Mod: CPTII,S$GLB,, | Performed by: NURSE PRACTITIONER

## 2022-09-09 PROCEDURE — 99999 PR PBB SHADOW E&M-EST. PATIENT-LVL IV: ICD-10-PCS | Mod: PBBFAC,,, | Performed by: NURSE PRACTITIONER

## 2022-09-09 PROCEDURE — 1159F MED LIST DOCD IN RCRD: CPT | Mod: CPTII,S$GLB,, | Performed by: INTERNAL MEDICINE

## 2022-09-09 PROCEDURE — 1159F PR MEDICATION LIST DOCUMENTED IN MEDICAL RECORD: ICD-10-PCS | Mod: CPTII,S$GLB,, | Performed by: INTERNAL MEDICINE

## 2022-09-09 PROCEDURE — 3008F BODY MASS INDEX DOCD: CPT | Mod: CPTII,S$GLB,, | Performed by: INTERNAL MEDICINE

## 2022-09-09 PROCEDURE — 3075F PR MOST RECENT SYSTOLIC BLOOD PRESS GE 130-139MM HG: ICD-10-PCS | Mod: CPTII,S$GLB,, | Performed by: INTERNAL MEDICINE

## 2022-09-09 PROCEDURE — 3078F DIAST BP <80 MM HG: CPT | Mod: CPTII,S$GLB,, | Performed by: INTERNAL MEDICINE

## 2022-09-09 PROCEDURE — 99204 OFFICE O/P NEW MOD 45 MIN: CPT | Mod: S$GLB,,, | Performed by: INTERNAL MEDICINE

## 2022-09-09 PROCEDURE — 99204 OFFICE O/P NEW MOD 45 MIN: CPT | Mod: S$GLB,,, | Performed by: NURSE PRACTITIONER

## 2022-09-09 PROCEDURE — 99999 PR PBB SHADOW E&M-EST. PATIENT-LVL III: ICD-10-PCS | Mod: PBBFAC,,, | Performed by: INTERNAL MEDICINE

## 2022-09-09 PROCEDURE — 99999 PR PBB SHADOW E&M-EST. PATIENT-LVL III: CPT | Mod: PBBFAC,,, | Performed by: INTERNAL MEDICINE

## 2022-09-09 NOTE — PROGRESS NOTES
Subjective:    Patient ID:  Mely Viera is a 61 y.o. female who presents for evaluation of No chief complaint on file.      HPI61 yo WF with hx of vasovagal syncope and PVC's on holter. She recently has been having episodes of lightheaded and dizziness. She has an EKG with T wave changes that are consistent with ischemia but has never been demonstrated.     Review of Systems   Constitutional: Negative for decreased appetite, fever, malaise/fatigue, weight gain and weight loss.   HENT:  Negative for hearing loss and nosebleeds.    Eyes:  Negative for visual disturbance.   Cardiovascular:  Negative for chest pain, claudication, cyanosis, dyspnea on exertion, irregular heartbeat, leg swelling, near-syncope, orthopnea, palpitations, paroxysmal nocturnal dyspnea and syncope.   Respiratory:  Negative for cough, hemoptysis, shortness of breath, sleep disturbances due to breathing, snoring and wheezing.    Endocrine: Negative for cold intolerance, heat intolerance, polydipsia and polyuria.   Hematologic/Lymphatic: Negative for adenopathy and bleeding problem. Does not bruise/bleed easily.   Skin:  Negative for color change, itching, poor wound healing, rash and suspicious lesions.   Musculoskeletal:  Negative for arthritis, back pain, falls, joint pain, joint swelling, muscle cramps, muscle weakness and myalgias.   Gastrointestinal:  Negative for bloating, abdominal pain, change in bowel habit, constipation, flatus, heartburn, hematemesis, hematochezia, hemorrhoids, jaundice, melena, nausea and vomiting.   Genitourinary:  Negative for bladder incontinence, decreased libido, frequency, hematuria, hesitancy and urgency.   Neurological:  Negative for brief paralysis, difficulty with concentration, excessive daytime sleepiness, dizziness, focal weakness, headaches, light-headedness, loss of balance, numbness, vertigo and weakness.   Psychiatric/Behavioral:  Negative for altered mental status, depression and memory loss. The  "patient does not have insomnia and is not nervous/anxious.    Allergic/Immunologic: Negative for environmental allergies, hives and persistent infections.      Objective:    Physical Exam  Vitals and nursing note reviewed.   Constitutional:       Appearance: She is well-developed.      Comments: /77 (BP Location: Left arm, Patient Position: Sitting, BP Method: Medium (Manual))   Pulse 71   Ht 5' 7" (1.702 m)   Wt 53.2 kg (117 lb 4.6 oz)   LMP 08/11/2014   BMI 18.37 kg/m²      HENT:      Head: Normocephalic and atraumatic.      Right Ear: External ear normal.      Left Ear: External ear normal.      Nose: Nose normal.   Eyes:      General: Lids are normal. No scleral icterus.        Right eye: No discharge.         Left eye: No discharge.      Conjunctiva/sclera: Conjunctivae normal.      Right eye: No hemorrhage.     Pupils: Pupils are equal, round, and reactive to light.   Neck:      Thyroid: No thyromegaly.      Vascular: No JVD.      Trachea: No tracheal deviation.   Cardiovascular:      Rate and Rhythm: Normal rate and regular rhythm.      Pulses: Intact distal pulses.      Heart sounds: Normal heart sounds. No murmur heard.    No friction rub. No gallop.   Pulmonary:      Effort: Pulmonary effort is normal. No respiratory distress.      Breath sounds: Normal breath sounds. No wheezing or rales.   Chest:      Chest wall: No tenderness.   Breasts:     Breasts are symmetrical.   Abdominal:      General: Bowel sounds are normal. There is no distension.      Palpations: Abdomen is soft. There is no hepatomegaly or mass.      Tenderness: There is no abdominal tenderness. There is no guarding or rebound.   Musculoskeletal:         General: No tenderness. Normal range of motion.      Cervical back: Normal range of motion and neck supple.   Lymphadenopathy:      Cervical: No cervical adenopathy.   Skin:     General: Skin is warm and dry.      Coloration: Skin is not pale.      Findings: No erythema or rash. "   Neurological:      Mental Status: She is alert and oriented to person, place, and time.      Cranial Nerves: No cranial nerve deficit.      Coordination: Coordination normal.      Deep Tendon Reflexes: Reflexes are normal and symmetric. Reflexes normal.   Psychiatric:         Behavior: Behavior normal.         Thought Content: Thought content normal.         Judgment: Judgment normal.         Assessment:       1. Syncope and collapse    2. Dizziness    3. Lightheaded    4. Abnormal EKG    5. Palpitations         Plan:     Because of abnormal EKG will get nuclear scan    Also suggested taking BP at home while symptomatic  Orders Placed This Encounter   Procedures    Nuclear Stress - Cardiology Interpreted    Holter monitor - 48 hour    Echo     Follow up in about 6 weeks (around 10/21/2022).

## 2022-09-09 NOTE — PROGRESS NOTES
Subjective:       Patient ID: Mely Viera is a 61 y.o. female Body mass index is 18.23 kg/m².    Chief Complaint: GI Problem (bloating)    This patient is new to me.  Referring Provider: Dr. Elisha Street for abdominal bloating & periumbilical abdominal pain.     Started 4 weeks ago with severe lower abdominal pain (mostly suprapubic), had x-ray and showed stool, PCP put her on glycolax, was doing half a dose of it and no relief so she increased to a full dose and started having heart palpitations, and went to the ER for it. Patient reports she never took dulcolax as noted in the ER visit note. After the ER visit, she had diarrhea. Diagnosed with irregular heart rhythm, seeing cardiologist later today.    GI Problem  The primary symptoms include weight loss (has lost ~10 lbs over the past 2-3 years; not trying to lose weight), fatigue, nausea (occasional) and melena (was having black stool; was taking iron supplement but has stopped taking it). Primary symptoms do not include fever, abdominal pain (started 8/9/2022, has resolved after she had diarrhea), vomiting, diarrhea (has resolved) or hematochezia.   The illness is also significant for bloating. The illness does not include dysphagia, odynophagia or constipation (started ~ 4 weeks ago with abdominal pain, diagnosed by x-ray, took miralax for a few days and it didn't work at first but then had diarrhea and stopped taking it). Associated symptoms comments: bowel movements are once daily of soft formed stool, increased fiber in her diet & drinks plenty of water.. Associated medical issues do not include GERD.     Review of Systems   Constitutional:  Positive for fatigue and weight loss (has lost ~10 lbs over the past 2-3 years; not trying to lose weight). Negative for appetite change (has improved; eating more now; 3 meals a day, protein snack and eating several snacks a day) and fever.   Respiratory:  Positive for shortness of breath (intermittent, gets winded  easily, denies currently).    Cardiovascular:  Positive for chest pain (intermittent, denies currently) and palpitations.        Seeing cardiologist later today   Gastrointestinal:  Positive for bloating, melena (was having black stool; was taking iron supplement but has stopped taking it) and nausea (occasional). Negative for abdominal pain (started 8/9/2022, has resolved after she had diarrhea), anal bleeding, blood in stool, constipation (started ~ 4 weeks ago with abdominal pain, diagnosed by x-ray, took miralax for a few days and it didn't work at first but then had diarrhea and stopped taking it), diarrhea (has resolved), dysphagia, hematochezia and vomiting.   Genitourinary:         Seeing GYN for cystocele with rectocele   Neurological:  Positive for dizziness (intermittent, denies currently) and light-headedness (intermittent, denies currently).       Patient's last menstrual period was 08/11/2014. Postmenopausal  Past Medical History:   Diagnosis Date    Anxiety     Depression     Heat stroke     HSV (herpes simplex virus) infection     Sleep disturbance      Past Surgical History:   Procedure Laterality Date    NONE       Family History   Problem Relation Age of Onset    Bladder Cancer Father     Diabetes Sister     Hypertension Sister     Crohn's disease Sister     Breast cancer Maternal Aunt 49    Ovarian cancer Paternal Cousin 65    COPD Neg Hx     Stomach cancer Neg Hx     Esophageal cancer Neg Hx     Ulcerative colitis Neg Hx     Celiac disease Neg Hx      Social History     Tobacco Use    Smoking status: Never    Smokeless tobacco: Never   Substance Use Topics    Alcohol use: Not Currently     Alcohol/week: 0.0 - 2.0 standard drinks     Comment: less than once a week    Drug use: No     Wt Readings from Last 10 Encounters:   09/09/22 52.8 kg (116 lb 6.5 oz)   09/06/22 52.8 kg (116 lb 6.5 oz)   09/02/22 52.2 kg (115 lb 1.3 oz)   08/25/22 51.3 kg (113 lb)   08/15/22 52.4 kg (115 lb 8.3 oz)   07/12/21  53.8 kg (118 lb 9.7 oz)   11/09/20 55.3 kg (121 lb 14.6 oz)   08/22/19 56.6 kg (124 lb 12.5 oz)   07/10/19 56.2 kg (124 lb)   07/10/19 56.3 kg (124 lb 1.9 oz)     Lab Results   Component Value Date    WBC 6.61 08/25/2022    HGB 14.2 08/25/2022    HCT 42.1 08/25/2022    MCV 94 08/25/2022     08/25/2022     CMP  Sodium   Date Value Ref Range Status   08/25/2022 142 136 - 145 mmol/L Final     Potassium   Date Value Ref Range Status   08/25/2022 4.3 3.5 - 5.1 mmol/L Final     Chloride   Date Value Ref Range Status   08/25/2022 104 95 - 110 mmol/L Final     CO2   Date Value Ref Range Status   08/25/2022 26 22 - 31 mmol/L Final     Glucose   Date Value Ref Range Status   08/25/2022 98 70 - 110 mg/dL Final     Comment:     The ADA recommends the following guidelines for fasting glucose:    Normal:       less than 100 mg/dL    Prediabetes:  100 mg/dL to 125 mg/dL    Diabetes:     126 mg/dL or higher       BUN   Date Value Ref Range Status   08/25/2022 12 7 - 18 mg/dL Final     Creatinine   Date Value Ref Range Status   08/25/2022 0.77 0.50 - 1.40 mg/dL Final     Calcium   Date Value Ref Range Status   08/25/2022 9.4 8.4 - 10.2 mg/dL Final     Total Protein   Date Value Ref Range Status   08/25/2022 7.9 6.0 - 8.4 g/dL Final     Albumin   Date Value Ref Range Status   08/25/2022 4.6 3.5 - 5.2 g/dL Final     Total Bilirubin   Date Value Ref Range Status   08/25/2022 0.5 0.2 - 1.3 mg/dL Final     Alkaline Phosphatase   Date Value Ref Range Status   08/25/2022 82 38 - 145 U/L Final     AST   Date Value Ref Range Status   08/25/2022 34 14 - 36 U/L Final     ALT   Date Value Ref Range Status   08/25/2022 28 0 - 35 U/L Final     Anion Gap   Date Value Ref Range Status   08/25/2022 12 8 - 16 mmol/L Final     eGFR if    Date Value Ref Range Status   11/11/2020 >60.0 >60 mL/min/1.73 m^2 Final     eGFR if non    Date Value Ref Range Status   11/11/2020 >60.0 >60 mL/min/1.73 m^2 Final     Comment:      Calculation used to obtain the estimated glomerular filtration  rate (eGFR) is the CKD-EPI equation.        Lab Results   Component Value Date    AMYLASE 82 08/16/2022     Lab Results   Component Value Date    LIPASE 35 08/16/2022     Lab Results   Component Value Date    TSH 2.640 08/16/2022     Reviewed prior medical records including radiology report of 8/15/2022 abdominal x-ray; 8/15/2022 and 9/2/2022 visit notes with Dr. Street; & 8/25/2022 ER visit note.    Objective:      Physical Exam  Vitals and nursing note reviewed.   Constitutional:       General: She is not in acute distress.     Appearance: Normal appearance. She is well-developed. She is not diaphoretic.   HENT:      Mouth/Throat:      Comments: Patient is wearing a face mask, which covers patient's mouth and nose, due to COVID 19 concerns.  Eyes:      General: No scleral icterus.     Conjunctiva/sclera: Conjunctivae normal.   Pulmonary:      Effort: Pulmonary effort is normal. No respiratory distress.      Breath sounds: Normal breath sounds. No wheezing.   Abdominal:      General: Bowel sounds are normal. There is no distension or abdominal bruit.      Palpations: Abdomen is soft. Abdomen is not rigid. There is no mass.      Tenderness: There is no abdominal tenderness. There is no guarding or rebound. Negative signs include Francis's sign and McBurney's sign.   Skin:     General: Skin is warm and dry.      Coloration: Skin is not pale.      Findings: No erythema or rash.      Comments: Non-jaundiced   Neurological:      Mental Status: She is alert and oriented to person, place, and time.   Psychiatric:         Behavior: Behavior normal.         Thought Content: Thought content normal.         Judgment: Judgment normal.       Assessment:       1. Weight loss    2. Adult BMI <19 kg/sq m    3. Abdominal bloating    4. History of abdominal pain    5. Black stool    6. Nausea    7. Family history of Crohn's disease    8. History of lightheadedness     9. History of dizziness    10. Nonspecific chest pain    11. Palpitations    12. History of shortness of breath          Plan:       Weight loss & Adult BMI <19 kg/sq m  -     Tissue Transglutaminase, IgA; Future; Expected date: 09/09/2022  -     IgA; Future; Expected date: 09/09/2022  -     CT Abdomen Pelvis  Without Contrast; Future; Expected date: 09/09/2022  - schedule EGD, discussed procedure with patient, including risks and benefits, patient verbalized understanding  - schedule Colonoscopy, discussed procedure with the patient, including risks and benefits, patient verbalized understanding  - encouraged PO intake and daily calorie counts to ensure adequate nutrition is taken in, recommend at least 2,000 calories a day  - recommend nutritional drinks, such as Boost, Ensure or Glucerna, to supplement nutrition needs    Abdominal bloating  -     CT Abdomen Pelvis  Without Contrast; Future; Expected date: 09/09/2022  - recommend OTC simethicone as directed, such as Phazyme or Gas-x  - recommend low gas diet: Reduce or eliminate these foods from your diet: Broccoli, Cauliflower, Bryan sprouts, Cabbage, Cooked dried beans, Carbonated beverages (sparkling water, soda, beer, champagne)  Other Causes Of Excess Gas Include:   1) EATING TOO FAST or TALKING WHILE YOU CHEW may cause you to swallow air. This increases the amount of gas in the stomach and may worsen your symptoms.  --> Chew each mouthful completely before swallowing. Take your time.  2) OVEREATING may increase the feeling of being bloated and cause more gas.  --> When you are full, stop eating.  3) CONSTIPATION can increase the amount of normal intestinal gas.  --> Avoid constipation by increasing the amount of fiber in your diet by including whole cereal grains, fresh vegetables (except those in the above list) and fresh fruits. High-fiber foods absorb water and carry it out of the body. When increasing the amount of fiber in your diet, you also need  to increase the amount of water that you drink. You should drink at least eight 8-ounce glasses of water (two quarts) per day.  - schedule EGD, discussed procedure with patient, including risks and benefits, patient verbalized understanding  - schedule Colonoscopy, discussed procedure with the patient, including risks and benefits, patient verbalized understanding    History of abdominal pain  - schedule EGD, discussed procedure with patient, including risks and benefits, patient verbalized understanding  - schedule Colonoscopy, discussed procedure with the patient, including risks and benefits, patient verbalized understanding  - avoid/minimize use of NSAIDs- since they can cause GI upset, bleeding and/or ulcers. If NSAID must be taken, recommend take with food.  -     CT Abdomen Pelvis  Without Contrast; Future; Expected date: 09/09/2022  - Recommend high fiber diet (20-30 grams of fiber daily)/OTC fiber supplements daily as directed, such as Benefiber or Metamucil.    Black stool  -     CT Abdomen Pelvis  Without Contrast; Future; Expected date: 09/09/2022  - schedule EGD, discussed procedure with patient, including risks and benefits, patient verbalized understanding  - avoid/minimize use of NSAIDs- since they can cause GI upset, bleeding and/or ulcers. If NSAID must be taken, recommend take with food.  - advised patient that pepto and iron supplement use can cause possible side effect of change in stool color    Nausea  - schedule EGD, discussed procedure with patient, including risks and benefits, patient verbalized understanding    Family history of Crohn's disease  - schedule EGD, discussed procedure with patient, including risks and benefits, patient verbalized understanding  - schedule Colonoscopy, discussed procedure with the patient, including risks and benefits, patient verbalized understanding    History of lightheadedness & History of dizziness  Recommend follow-up with Primary Care Provider &  cardiologist for continued evaluation and management.    Nonspecific chest pain, History of shortness of breath, & Palpitations  - follow-up with PCP &/or cardiologist for continued evaluation and management ASAP  - if experiencing symptoms of headache, chest pain, shortness of breath, and/or blurred vision, recommend going to ER for further evaluation and management    Follow up in about 1 month (around 10/9/2022), or if symptoms worsen or fail to improve.      If no improvement in symptoms or symptoms worsen, call/follow-up at clinic or go to ER.        48 minutes of total time spent on the encounter, which includes face to face time and non-face to face time preparing to see the patient (e.g., review of tests), Obtaining and/or reviewing separately obtained history, Documenting clinical information in the electronic or other health record, Independently interpreting results (not separately reported) and communicating results to the patient/family/caregiver, or Care coordination (not separately reported).

## 2022-09-09 NOTE — PATIENT INSTRUCTIONS
Abdominal Pain    Abdominal pain is pain in the stomach or belly area. Everyone has this pain from time to time. In many cases it goes away on its own. But abdominal pain can sometimes be due to a serious problem, such as appendicitis. So its important to know when to seek help.  Causes of abdominal pain  There are many possible causes of abdominal pain. Common causes in adults include:  Constipation, diarrhea, or gas  Stomach acid flowing back up into the esophagus (acid reflux or heartburn)  Severe acid reflux, called GERD (gastroesophageal reflux disease)  A sore in the lining of the stomach or small intestine (peptic ulcer)  Inflammation of the gallbladder, liver, or pancreas  Gallstones or kidney stones  Appendicitis   Intestinal blockage   An internal organ pushing through a muscle or other tissue (hernia)  Urinary tract infections  In women, menstrual cramps, fibroids, or endometriosis  Inflammation or infection of the intestines  Diagnosing the cause of abdominal pain  Your healthcare provider will do a physical exam help find the cause of your pain. If needed, tests will be ordered. Belly pain has many possible causes. So it can be hard to find the reason for your pain. Giving details about your pain can help. Tell your provider where and when you feel the pain, and what makes it better or worse. Also let your provider know if you have other symptoms such as:  Fever  Tiredness  Upset stomach (nausea)  Vomiting  Changes in bathroom habits  Treating abdominal pain  Some causes of pain need emergency medical treatment right away. These include appendicitis or a bowel blockage. Other problems can be treated with rest, fluids, or medicines. Your healthcare provider can give you specific instructions for treatment or self-care based on what is causing your pain.  If you have vomiting or diarrhea, sip water or other clear fluids. When you are ready to eat solid foods again, start with small amounts of  easy-to-digest, low-fat foods. These include apple sauce, toast, or crackers.   When to seek medical care  Call 911 or go to the hospital right away if you:  Cant pass stool and are vomiting  Are vomiting blood or have bloody diarrhea or black, tarry diarrhea  Have chest, neck, or shoulder pain  Feel like you might pass out  Have pain in your shoulder blades with nausea  Have sudden, severe belly pain  Have new, severe pain unlike any you have felt before  Have a belly that is rigid, hard, and tender to touch  Call your healthcare provider if you have:  Pain for more than 5 days  Bloating for more than 2 days  Diarrhea for more than 5 days  A fever of 100.4°F (38.0°C) or higher, or as directed by your provider  Pain that gets worse  Weight loss for no reason  Continued lack of appetite  Blood in your stool  How to prevent abdominal pain  Here are some tips to help prevent abdominal pain:  Eat smaller amounts of food at one time.  Avoid greasy, fried, or other high-fat foods.  Avoid foods that give you gas.  Exercise regularly.  Drink plenty of fluids.  To help prevent GERD symptoms:  Quit smoking.  Reduce alcohol and certain foods that increase stomach acid.  Avoid aspirin and over-the-counter pain and fever medicines (NSAIDS or nonsteroidal anti-inflammatory drugs), if possible  Lose extra weight.  Finish eating at least 2 hours before you go to bed or lie down.  Raise the head of your bed.  Date Last Reviewed: 7/1/2016  © 5874-9243 [employer] [address] [place] [address], [place] [address]. All rights reserved. This information is not intended as a substitute for professional medical care. Always follow your healthcare professional's instructions.         Excess Gas  Certain foods produce gas when digested. In some people, these foods make an excessive amount of gas. This may cause bloating, burping, or increased gas passing through the rectum (flatulence).  - Recommend taking Over-The-Counter  simethicone as directed on packaging, such as Phazyme or Gas-x.    Foods that cause gas  The following foods are more likely to cause this problem. Limit them, or remove them from your diet:  Broccoli  Cauliflower  [place] sprouts  Cabbage  Cooked dried beans  Fizzy (carbonated) drinks, such as sparkling water, soda, beer, and champagne  Other causes  Other causes of excess gas include:  Eating too fast or talking while you chew. This may cause you to swallow air. This increases the amount of gas in your stomach. And it may make your symptoms worse. Chew each mouthful completely before you swallow. Take your time.  Chewing on gum or sucking on hard candy. These cause you to swallow more often. And some of what you are swallowing is air. This leads to more gas in your stomach. Avoid chewing gum and hard candy.  Overeating. This may increase the feeling of being bloated and cause more gas. When you are full, stop eating.   Being constipated. This can increase the amount of normal intestinal gas. Avoid constipation by getting more fiber in your diet. Good sources of fiber include whole-grain cereal, fresh vegetables (except those in the above list), and fresh fruits. High-fiber foods absorb water and carry it out of the body. When adding more fiber to your diet, you also need to drink more water. You should drink at least 8, 8-ounce glasses of water (2 quarts) per day.  Date Last Reviewed: 8/1/2016  © 5557-7527 [employer] [address] [place] [address], [place] [address]. All rights reserved. This information is not intended as a substitute for professional medical care. Always follow your healthcare professional's instructions.           Eating a High-Fiber Diet  Fiber is what gives strength and structure to plants. Most grains, beans, vegetables, and fruits contain fiber. Foods rich in fiber are often low in calories and fat, and they fill you up more. They may also reduce your risks for certain health  problems. To find out the amount of fiber in canned, packaged, or frozen foods, read the Nutrition Facts label. It tells you how much fiber is in a serving.    Types of fiber and their benefits  There are two types of fiber: insoluble and soluble. They both aid digestion and help you maintain a healthy weight.  Insoluble fiber. This is found in whole grains, cereals, certain fruits and vegetables such as apple skin, corn, and carrots. Insoluble fiber may prevent constipation and reduce the risk for certain types of cancer.  Soluble fiber. This type of fiber is in oats, beans, and certain fruits and vegetables such as strawberries and peas. Soluble fiber can reduce cholesterol, which may help lower the risk for heart disease. It also helps control blood sugar levels.  Look for high-fiber foods  Try these foods to add fiber to your diet:  Whole-grain breads and cereals. Try to eat 6 to 8 ounces a day. Include wheat and oat bran cereals, whole-wheat muffins or toast, and corn tortillas in your meals.  Fruits. Try to eat 2 cups a day. Apples, oranges, strawberries, pears, and bananas are good sources. (Note: Fruit juice is low in fiber.)  Vegetables. Try to eat at least 2.5 cups a day. Add asparagus, carrots, broccoli, peas, and corn to your meals.  Beans. One cup of cooked lentils gives you over 15 grams of fiber. Try navy beans, lentils, and chickpeas.  Seeds. A small handful of seeds gives you about 3 grams of fiber. Try sunflower seeds.  Keep track of your fiber  Keep track of how much fiber you eat. Start by reading food labels. Then eat a variety of foods high in fiber. As you begin to eat more fiber, ask your healthcare provider how much water you should be drinking to keep your digestive system working smoothly.  You should aim for a certain amount of fiber in your diet each day. If you are a woman, that amount is between 25 and 28 grams per day. Men should aim for 30 to 33 grams per day. After age 50, your  daily fiber needs drop to 22 grams for women and 28 grams for men.  Before you reach for the fiber supplements, think about this. Fiber is found naturally in healthy whole foods. It gives you that feeling of fullness after you eat. Taking fiber supplements or eating fiber-enriched foods will not give you this full feeling.  Your fiber intake is a good measure for the quality of your overall diet. If you are missing out on your daily amount of fiber, you may be lacking other important nutrients as well.  Date Last Reviewed: 5/11/2015 © 2000-2017 [employer]. [address] [place] [id number]. All rights reserved. This information is not intended as a substitute for professional medical care. Always follow your healthcare professional's instructions.

## 2022-09-12 ENCOUNTER — CLINICAL SUPPORT (OUTPATIENT)
Dept: CARDIOLOGY | Facility: HOSPITAL | Age: 62
End: 2022-09-12
Attending: INTERNAL MEDICINE
Payer: COMMERCIAL

## 2022-09-12 VITALS — BODY MASS INDEX: 18.36 KG/M2 | WEIGHT: 117 LBS | HEIGHT: 67 IN | HEART RATE: 65 BPM

## 2022-09-12 LAB
ASCENDING AORTA: 2.42 CM
AV INDEX (PROSTH): 0.8
AV MEAN GRADIENT: 4 MMHG
AV PEAK GRADIENT: 6 MMHG
AV VALVE AREA: 2.16 CM2
AV VELOCITY RATIO: 0.83
BSA FOR ECHO PROCEDURE: 1.58 M2
CV ECHO LV RWT: 0.28 CM
DOP CALC AO PEAK VEL: 1.26 M/S
DOP CALC AO VTI: 26.5 CM
DOP CALC LVOT AREA: 2.7 CM2
DOP CALC LVOT DIAMETER: 1.85 CM
DOP CALC LVOT PEAK VEL: 1.04 M/S
DOP CALC LVOT STROKE VOLUME: 57.23 CM3
DOP CALCLVOT PEAK VEL VTI: 21.3 CM
E WAVE DECELERATION TIME: 151.94 MSEC
E/A RATIO: 1.22
E/E' RATIO: 8.86 M/S
ECHO LV POSTERIOR WALL: 0.63 CM (ref 0.6–1.1)
EJECTION FRACTION: 65 %
FRACTIONAL SHORTENING: 44 % (ref 28–44)
INTERVENTRICULAR SEPTUM: 0.52 CM (ref 0.6–1.1)
IVRT: 111.32 MSEC
LA MAJOR: 3.53 CM
LA MINOR: 4.12 CM
LA WIDTH: 3.2 CM
LEFT ATRIUM SIZE: 2.86 CM
LEFT ATRIUM VOLUME INDEX: 18.4 ML/M2
LEFT ATRIUM VOLUME: 29.58 CM3
LEFT INTERNAL DIMENSION IN SYSTOLE: 2.53 CM (ref 2.1–4)
LEFT VENTRICLE DIASTOLIC VOLUME INDEX: 57.66 ML/M2
LEFT VENTRICLE DIASTOLIC VOLUME: 92.84 ML
LEFT VENTRICLE MASS INDEX: 47 G/M2
LEFT VENTRICLE SYSTOLIC VOLUME INDEX: 14.2 ML/M2
LEFT VENTRICLE SYSTOLIC VOLUME: 22.92 ML
LEFT VENTRICULAR INTERNAL DIMENSION IN DIASTOLE: 4.51 CM (ref 3.5–6)
LEFT VENTRICULAR MASS: 75.14 G
LV LATERAL E/E' RATIO: 8.45 M/S
LV SEPTAL E/E' RATIO: 9.3 M/S
LVOT MG: 1.97 MMHG
LVOT MV: 0.64 CM/S
MV PEAK A VEL: 0.76 M/S
MV PEAK E VEL: 0.93 M/S
MV STENOSIS PRESSURE HALF TIME: 44.06 MS
MV VALVE AREA P 1/2 METHOD: 4.99 CM2
PISA MRMAX VEL: 3.8 M/S
PISA TR MAX VEL: 2.34 M/S
PULM VEIN S/D RATIO: 1.28
PV PEAK D VEL: 0.53 M/S
PV PEAK S VEL: 0.68 M/S
RA MAJOR: 3.41 CM
RA PRESSURE: 3 MMHG
RA WIDTH: 3.38 CM
RIGHT VENTRICULAR END-DIASTOLIC DIMENSION: 3.55 CM
RIGHT VENTRICULAR LENGTH IN DIASTOLE (APICAL 4-CHAMBER VIEW): 3.6 CM
RV MID DIAMA: 3.11 CM
RV TISSUE DOPPLER FREE WALL SYSTOLIC VELOCITY 1 (APICAL 4 CHAMBER VIEW): 0.01 CM/S
SINUS: 2.79 CM
STJ: 2.08 CM
TDI LATERAL: 0.11 M/S
TDI SEPTAL: 0.1 M/S
TDI: 0.11 M/S
TR MAX PG: 22 MMHG
TRICUSPID ANNULAR PLANE SYSTOLIC EXCURSION: 2.27 CM
TV REST PULMONARY ARTERY PRESSURE: 25 MMHG

## 2022-09-12 PROCEDURE — 93306 TTE W/DOPPLER COMPLETE: CPT | Mod: PO

## 2022-09-15 ENCOUNTER — OFFICE VISIT (OUTPATIENT)
Dept: OTOLARYNGOLOGY | Facility: CLINIC | Age: 62
End: 2022-09-15
Payer: COMMERCIAL

## 2022-09-15 ENCOUNTER — CLINICAL SUPPORT (OUTPATIENT)
Dept: AUDIOLOGY | Facility: CLINIC | Age: 62
End: 2022-09-15
Payer: COMMERCIAL

## 2022-09-15 ENCOUNTER — PATIENT MESSAGE (OUTPATIENT)
Dept: CARDIOLOGY | Facility: HOSPITAL | Age: 62
End: 2022-09-15
Payer: COMMERCIAL

## 2022-09-15 VITALS
SYSTOLIC BLOOD PRESSURE: 120 MMHG | DIASTOLIC BLOOD PRESSURE: 60 MMHG | HEIGHT: 67 IN | BODY MASS INDEX: 18.48 KG/M2 | WEIGHT: 117.75 LBS

## 2022-09-15 DIAGNOSIS — H93.11 TINNITUS OF RIGHT EAR: ICD-10-CM

## 2022-09-15 DIAGNOSIS — H83.01 VESTIBULITIS OF EAR, RIGHT: Primary | ICD-10-CM

## 2022-09-15 DIAGNOSIS — R09.81 NASAL CONGESTION: ICD-10-CM

## 2022-09-15 DIAGNOSIS — H90.3 BILATERAL SENSORINEURAL HEARING LOSS: Primary | ICD-10-CM

## 2022-09-15 DIAGNOSIS — J34.2 DNS (DEVIATED NASAL SEPTUM): ICD-10-CM

## 2022-09-15 DIAGNOSIS — R26.89 IMBALANCE: ICD-10-CM

## 2022-09-15 PROCEDURE — 1159F MED LIST DOCD IN RCRD: CPT | Mod: CPTII,S$GLB,, | Performed by: STUDENT IN AN ORGANIZED HEALTH CARE EDUCATION/TRAINING PROGRAM

## 2022-09-15 PROCEDURE — 99999 PR PBB SHADOW E&M-EST. PATIENT-LVL III: ICD-10-PCS | Mod: PBBFAC,,, | Performed by: STUDENT IN AN ORGANIZED HEALTH CARE EDUCATION/TRAINING PROGRAM

## 2022-09-15 PROCEDURE — 99999 PR PBB SHADOW E&M-EST. PATIENT-LVL II: ICD-10-PCS | Mod: PBBFAC,,,

## 2022-09-15 PROCEDURE — 99999 PR PBB SHADOW E&M-EST. PATIENT-LVL II: CPT | Mod: PBBFAC,,,

## 2022-09-15 PROCEDURE — 99204 PR OFFICE/OUTPT VISIT, NEW, LEVL IV, 45-59 MIN: ICD-10-PCS | Mod: S$GLB,,, | Performed by: STUDENT IN AN ORGANIZED HEALTH CARE EDUCATION/TRAINING PROGRAM

## 2022-09-15 PROCEDURE — 99999 PR PBB SHADOW E&M-EST. PATIENT-LVL III: CPT | Mod: PBBFAC,,, | Performed by: STUDENT IN AN ORGANIZED HEALTH CARE EDUCATION/TRAINING PROGRAM

## 2022-09-15 PROCEDURE — 1159F PR MEDICATION LIST DOCUMENTED IN MEDICAL RECORD: ICD-10-PCS | Mod: CPTII,S$GLB,, | Performed by: STUDENT IN AN ORGANIZED HEALTH CARE EDUCATION/TRAINING PROGRAM

## 2022-09-15 PROCEDURE — 3078F PR MOST RECENT DIASTOLIC BLOOD PRESSURE < 80 MM HG: ICD-10-PCS | Mod: CPTII,S$GLB,, | Performed by: STUDENT IN AN ORGANIZED HEALTH CARE EDUCATION/TRAINING PROGRAM

## 2022-09-15 PROCEDURE — 3008F BODY MASS INDEX DOCD: CPT | Mod: CPTII,S$GLB,, | Performed by: STUDENT IN AN ORGANIZED HEALTH CARE EDUCATION/TRAINING PROGRAM

## 2022-09-15 PROCEDURE — 3074F PR MOST RECENT SYSTOLIC BLOOD PRESSURE < 130 MM HG: ICD-10-PCS | Mod: CPTII,S$GLB,, | Performed by: STUDENT IN AN ORGANIZED HEALTH CARE EDUCATION/TRAINING PROGRAM

## 2022-09-15 PROCEDURE — 3008F PR BODY MASS INDEX (BMI) DOCUMENTED: ICD-10-PCS | Mod: CPTII,S$GLB,, | Performed by: STUDENT IN AN ORGANIZED HEALTH CARE EDUCATION/TRAINING PROGRAM

## 2022-09-15 PROCEDURE — 92567 PR TYMPA2METRY: ICD-10-PCS | Mod: S$GLB,,, | Performed by: AUDIOLOGIST

## 2022-09-15 PROCEDURE — 99204 OFFICE O/P NEW MOD 45 MIN: CPT | Mod: S$GLB,,, | Performed by: STUDENT IN AN ORGANIZED HEALTH CARE EDUCATION/TRAINING PROGRAM

## 2022-09-15 PROCEDURE — 3074F SYST BP LT 130 MM HG: CPT | Mod: CPTII,S$GLB,, | Performed by: STUDENT IN AN ORGANIZED HEALTH CARE EDUCATION/TRAINING PROGRAM

## 2022-09-15 PROCEDURE — 92567 TYMPANOMETRY: CPT | Mod: S$GLB,,, | Performed by: AUDIOLOGIST

## 2022-09-15 PROCEDURE — 92557 PR COMPREHENSIVE HEARING TEST: ICD-10-PCS | Mod: S$GLB,,, | Performed by: AUDIOLOGIST

## 2022-09-15 PROCEDURE — 92557 COMPREHENSIVE HEARING TEST: CPT | Mod: S$GLB,,, | Performed by: AUDIOLOGIST

## 2022-09-15 PROCEDURE — 3078F DIAST BP <80 MM HG: CPT | Mod: CPTII,S$GLB,, | Performed by: STUDENT IN AN ORGANIZED HEALTH CARE EDUCATION/TRAINING PROGRAM

## 2022-09-15 NOTE — PROGRESS NOTES
Mely Viera was seen 9/15/22 for an audiological evaluation.     Otoscopy revealed clear view of both external ear canals and tympanic membranes.  No obstructive cerumen present in either ear canal.       Audiogram results revealed a mild-to-moderate sensorineural hearing loss bilaterally.  Speech Reception Thresholds were  15 dBHL for the right ear and 15 dBHL for the left ear.    Word recognition scores were excellent for the right ear and excellent for the left ear.   Tympanograms were Type A for the right ear and Type A for the left ear.    Audiogram results were reviewed in detail with patient and all questions were answered. Results will be reviewed by ENT at the completion of this note.     Recommend binaural amplification pending medical clearance, hearing protection for all loud sounds and annual audiogram to monitor hearing loss.

## 2022-09-16 ENCOUNTER — CLINICAL SUPPORT (OUTPATIENT)
Dept: CARDIOLOGY | Facility: HOSPITAL | Age: 62
End: 2022-09-16
Attending: INTERNAL MEDICINE
Payer: COMMERCIAL

## 2022-09-16 ENCOUNTER — HOSPITAL ENCOUNTER (OUTPATIENT)
Dept: RADIOLOGY | Facility: HOSPITAL | Age: 62
Discharge: HOME | End: 2022-09-16
Attending: INTERNAL MEDICINE
Payer: COMMERCIAL

## 2022-09-16 ENCOUNTER — PATIENT MESSAGE (OUTPATIENT)
Dept: CARDIOLOGY | Facility: CLINIC | Age: 62
End: 2022-09-16

## 2022-09-16 VITALS — WEIGHT: 117 LBS | HEIGHT: 67 IN | BODY MASS INDEX: 18.36 KG/M2

## 2022-09-16 DIAGNOSIS — R94.31 ABNORMAL EKG: ICD-10-CM

## 2022-09-16 DIAGNOSIS — R55 SYNCOPE AND COLLAPSE: ICD-10-CM

## 2022-09-16 LAB
CV STRESS BASE HR: 92 BPM
DIASTOLIC BLOOD PRESSURE: 91 MMHG
NUC REST EJECTION FRACTION: 78
OHS CV CPX 1 MINUTE RECOVERY HEART RATE: 112 BPM
OHS CV CPX 85 PERCENT MAX PREDICTED HEART RATE MALE: 131
OHS CV CPX ESTIMATED METS: 11
OHS CV CPX MAX PREDICTED HEART RATE: 154
OHS CV CPX PATIENT IS FEMALE: 1
OHS CV CPX PATIENT IS MALE: 0
OHS CV CPX PEAK DIASTOLIC BLOOD PRESSURE: 81 MMHG
OHS CV CPX PEAK HEAR RATE: 139 BPM
OHS CV CPX PEAK RATE PRESSURE PRODUCT: NORMAL
OHS CV CPX PEAK SYSTOLIC BLOOD PRESSURE: 183 MMHG
OHS CV CPX PERCENT MAX PREDICTED HEART RATE ACHIEVED: 90
OHS CV CPX RATE PRESSURE PRODUCT PRESENTING: NORMAL
STRESS ECHO POST EXERCISE DUR MIN: 6 MINUTES
STRESS ECHO POST EXERCISE DUR SEC: 46 SECONDS
SYSTOLIC BLOOD PRESSURE: 144 MMHG

## 2022-09-16 PROCEDURE — 93016 CV STRESS TEST SUPVJ ONLY: CPT | Mod: ,,, | Performed by: INTERNAL MEDICINE

## 2022-09-16 PROCEDURE — 93018 CV STRESS TEST I&R ONLY: CPT | Mod: ,,, | Performed by: INTERNAL MEDICINE

## 2022-09-16 PROCEDURE — 93018 STRESS TEST WITH MYOCARDIAL PERFUSION (CUPID ONLY): ICD-10-PCS | Mod: ,,, | Performed by: INTERNAL MEDICINE

## 2022-09-16 PROCEDURE — 78452 STRESS TEST WITH MYOCARDIAL PERFUSION (CUPID ONLY): ICD-10-PCS | Mod: 26,,, | Performed by: INTERNAL MEDICINE

## 2022-09-16 PROCEDURE — 78452 HT MUSCLE IMAGE SPECT MULT: CPT | Mod: PO

## 2022-09-16 PROCEDURE — 93016 STRESS TEST WITH MYOCARDIAL PERFUSION (CUPID ONLY): ICD-10-PCS | Mod: ,,, | Performed by: INTERNAL MEDICINE

## 2022-09-16 PROCEDURE — 78452 HT MUSCLE IMAGE SPECT MULT: CPT | Mod: 26,,, | Performed by: INTERNAL MEDICINE

## 2022-09-16 PROCEDURE — A9502 TC99M TETROFOSMIN: HCPCS | Mod: PO

## 2022-10-10 ENCOUNTER — PATIENT MESSAGE (OUTPATIENT)
Dept: ADMINISTRATIVE | Facility: HOSPITAL | Age: 62
End: 2022-10-10
Payer: COMMERCIAL

## 2022-10-11 ENCOUNTER — CLINICAL SUPPORT (OUTPATIENT)
Dept: CARDIOLOGY | Facility: HOSPITAL | Age: 62
End: 2022-10-11
Attending: INTERNAL MEDICINE
Payer: COMMERCIAL

## 2022-10-11 DIAGNOSIS — R55 SYNCOPE AND COLLAPSE: ICD-10-CM

## 2022-10-11 DIAGNOSIS — R00.2 PALPITATIONS: ICD-10-CM

## 2022-10-11 PROCEDURE — 93227 HOLTER MONITOR - 48 HOUR (CUPID ONLY): ICD-10-PCS | Mod: ,,, | Performed by: INTERNAL MEDICINE

## 2022-10-11 PROCEDURE — 93226 XTRNL ECG REC<48 HR SCAN A/R: CPT | Mod: PO

## 2022-10-11 PROCEDURE — 93227 XTRNL ECG REC<48 HR R&I: CPT | Mod: ,,, | Performed by: INTERNAL MEDICINE

## 2022-10-14 LAB
OHS CV EVENT MONITOR DAY: 0
OHS CV HOLTER LENGTH DECIMAL HOURS: 48
OHS CV HOLTER LENGTH HOURS: 48
OHS CV HOLTER LENGTH MINUTES: 0
OHS CV HOLTER SINUS AVERAGE HR: 72
OHS CV HOLTER SINUS MAX HR: 122
OHS CV HOLTER SINUS MIN HR: 43

## 2022-10-24 ENCOUNTER — PATIENT MESSAGE (OUTPATIENT)
Dept: ADMINISTRATIVE | Facility: HOSPITAL | Age: 62
End: 2022-10-24
Payer: COMMERCIAL

## 2022-10-24 ENCOUNTER — PATIENT OUTREACH (OUTPATIENT)
Dept: ADMINISTRATIVE | Facility: HOSPITAL | Age: 62
End: 2022-10-24
Payer: COMMERCIAL

## 2022-10-24 NOTE — PROGRESS NOTES
Pre-Visit Chart Review  For Appointment Scheduled on 11/7/2022    Health Maintenance Due   Topic    TETANUS VACCINE     COVID-19 Vaccine (3 - Booster for Pfizer series)    Mammogram     Colorectal Cancer Screening     Influenza Vaccine (1)

## 2022-10-27 ENCOUNTER — OFFICE VISIT (OUTPATIENT)
Dept: CARDIOLOGY | Facility: CLINIC | Age: 62
End: 2022-10-27
Payer: COMMERCIAL

## 2022-10-27 VITALS
WEIGHT: 119.69 LBS | DIASTOLIC BLOOD PRESSURE: 71 MMHG | BODY MASS INDEX: 18.79 KG/M2 | HEART RATE: 70 BPM | HEIGHT: 67 IN | SYSTOLIC BLOOD PRESSURE: 110 MMHG

## 2022-10-27 DIAGNOSIS — R00.2 PALPITATIONS: Primary | ICD-10-CM

## 2022-10-27 DIAGNOSIS — R42 LIGHTHEADED: ICD-10-CM

## 2022-10-27 PROCEDURE — 99999 PR PBB SHADOW E&M-EST. PATIENT-LVL III: ICD-10-PCS | Mod: PBBFAC,,, | Performed by: INTERNAL MEDICINE

## 2022-10-27 PROCEDURE — 3074F PR MOST RECENT SYSTOLIC BLOOD PRESSURE < 130 MM HG: ICD-10-PCS | Mod: CPTII,S$GLB,, | Performed by: INTERNAL MEDICINE

## 2022-10-27 PROCEDURE — 3078F DIAST BP <80 MM HG: CPT | Mod: CPTII,S$GLB,, | Performed by: INTERNAL MEDICINE

## 2022-10-27 PROCEDURE — 3074F SYST BP LT 130 MM HG: CPT | Mod: CPTII,S$GLB,, | Performed by: INTERNAL MEDICINE

## 2022-10-27 PROCEDURE — 1159F MED LIST DOCD IN RCRD: CPT | Mod: CPTII,S$GLB,, | Performed by: INTERNAL MEDICINE

## 2022-10-27 PROCEDURE — 3078F PR MOST RECENT DIASTOLIC BLOOD PRESSURE < 80 MM HG: ICD-10-PCS | Mod: CPTII,S$GLB,, | Performed by: INTERNAL MEDICINE

## 2022-10-27 PROCEDURE — 99999 PR PBB SHADOW E&M-EST. PATIENT-LVL III: CPT | Mod: PBBFAC,,, | Performed by: INTERNAL MEDICINE

## 2022-10-27 PROCEDURE — 1160F RVW MEDS BY RX/DR IN RCRD: CPT | Mod: CPTII,S$GLB,, | Performed by: INTERNAL MEDICINE

## 2022-10-27 PROCEDURE — 99213 PR OFFICE/OUTPT VISIT, EST, LEVL III, 20-29 MIN: ICD-10-PCS | Mod: S$GLB,,, | Performed by: INTERNAL MEDICINE

## 2022-10-27 PROCEDURE — 1159F PR MEDICATION LIST DOCUMENTED IN MEDICAL RECORD: ICD-10-PCS | Mod: CPTII,S$GLB,, | Performed by: INTERNAL MEDICINE

## 2022-10-27 PROCEDURE — 99213 OFFICE O/P EST LOW 20 MIN: CPT | Mod: S$GLB,,, | Performed by: INTERNAL MEDICINE

## 2022-10-27 PROCEDURE — 1160F PR REVIEW ALL MEDS BY PRESCRIBER/CLIN PHARMACIST DOCUMENTED: ICD-10-PCS | Mod: CPTII,S$GLB,, | Performed by: INTERNAL MEDICINE

## 2022-10-27 NOTE — PROGRESS NOTES
"Subjective:    Patient ID:  Mely Viera is a 62 y.o. female who presents for follow-up of Loss of Consciousness      HPI62 yo WF seen for light headiness and palpitations. Underwent a complete work up and was negative. Reassued patient that objectively her heart is normal despite NSSTT changes on EKG.     Review of Systems   Cardiovascular:  Negative for chest pain, claudication, cyanosis, dyspnea on exertion, irregular heartbeat, leg swelling, near-syncope, orthopnea, palpitations, paroxysmal nocturnal dyspnea and syncope.      Objective:    Physical Exam  Vitals and nursing note reviewed.   Constitutional:       Appearance: She is well-developed.      Comments: /71 (BP Location: Left arm, Patient Position: Sitting, BP Method: Medium (Automatic))   Pulse 70   Ht 5' 7" (1.702 m)   Wt 54.3 kg (119 lb 11.4 oz)   LMP 08/11/2014   BMI 18.75 kg/m²      HENT:      Head: Normocephalic and atraumatic.      Right Ear: External ear normal.      Left Ear: External ear normal.      Nose: Nose normal.   Eyes:      General: Lids are normal. No scleral icterus.        Right eye: No discharge.         Left eye: No discharge.      Conjunctiva/sclera: Conjunctivae normal.      Right eye: No hemorrhage.     Pupils: Pupils are equal, round, and reactive to light.   Neck:      Thyroid: No thyromegaly.      Vascular: No JVD.      Trachea: No tracheal deviation.   Cardiovascular:      Rate and Rhythm: Normal rate and regular rhythm.      Pulses: Intact distal pulses.      Heart sounds: Normal heart sounds. No murmur heard.    No friction rub. No gallop.   Pulmonary:      Effort: Pulmonary effort is normal. No respiratory distress.      Breath sounds: Normal breath sounds. No wheezing or rales.   Chest:      Chest wall: No tenderness.   Breasts:     Breasts are symmetrical.   Abdominal:      General: Bowel sounds are normal. There is no distension.      Palpations: Abdomen is soft. There is no hepatomegaly or mass.      " Tenderness: There is no abdominal tenderness. There is no guarding or rebound.   Musculoskeletal:         General: No tenderness. Normal range of motion.      Cervical back: Normal range of motion and neck supple.   Lymphadenopathy:      Cervical: No cervical adenopathy.   Skin:     General: Skin is warm and dry.      Coloration: Skin is not pale.      Findings: No erythema or rash.   Neurological:      Mental Status: She is alert and oriented to person, place, and time.      Cranial Nerves: No cranial nerve deficit.      Coordination: Coordination normal.      Deep Tendon Reflexes: Reflexes are normal and symmetric. Reflexes normal.   Psychiatric:         Behavior: Behavior normal.         Thought Content: Thought content normal.         Judgment: Judgment normal.         Assessment:       1. Palpitations    2. Lightheaded         Plan:     Maintain hydration    Patient advised to modify risk factors such as weight, exercise, diet,  tobacco and alcohol exposure    No orders of the defined types were placed in this encounter.    Follow up in about 1 year (around 10/27/2023).

## 2022-11-07 ENCOUNTER — IMMUNIZATION (OUTPATIENT)
Dept: PHARMACY | Facility: CLINIC | Age: 62
End: 2022-11-07
Payer: COMMERCIAL

## 2022-11-07 ENCOUNTER — OFFICE VISIT (OUTPATIENT)
Dept: FAMILY MEDICINE | Facility: CLINIC | Age: 62
End: 2022-11-07
Payer: COMMERCIAL

## 2022-11-07 VITALS
BODY MASS INDEX: 18.51 KG/M2 | DIASTOLIC BLOOD PRESSURE: 70 MMHG | SYSTOLIC BLOOD PRESSURE: 118 MMHG | OXYGEN SATURATION: 98 % | HEART RATE: 76 BPM | WEIGHT: 118.19 LBS

## 2022-11-07 DIAGNOSIS — K57.90 DIVERTICULOSIS: ICD-10-CM

## 2022-11-07 DIAGNOSIS — M41.26 OTHER IDIOPATHIC SCOLIOSIS, LUMBAR REGION: ICD-10-CM

## 2022-11-07 DIAGNOSIS — G47.09 OTHER INSOMNIA: Primary | ICD-10-CM

## 2022-11-07 PROCEDURE — 3008F BODY MASS INDEX DOCD: CPT | Mod: CPTII,S$GLB,, | Performed by: FAMILY MEDICINE

## 2022-11-07 PROCEDURE — 99214 PR OFFICE/OUTPT VISIT, EST, LEVL IV, 30-39 MIN: ICD-10-PCS | Mod: S$GLB,,, | Performed by: FAMILY MEDICINE

## 2022-11-07 PROCEDURE — 3074F SYST BP LT 130 MM HG: CPT | Mod: CPTII,S$GLB,, | Performed by: FAMILY MEDICINE

## 2022-11-07 PROCEDURE — 99214 OFFICE O/P EST MOD 30 MIN: CPT | Mod: S$GLB,,, | Performed by: FAMILY MEDICINE

## 2022-11-07 PROCEDURE — 99999 PR PBB SHADOW E&M-EST. PATIENT-LVL III: CPT | Mod: PBBFAC,,, | Performed by: FAMILY MEDICINE

## 2022-11-07 PROCEDURE — 1159F PR MEDICATION LIST DOCUMENTED IN MEDICAL RECORD: ICD-10-PCS | Mod: CPTII,S$GLB,, | Performed by: FAMILY MEDICINE

## 2022-11-07 PROCEDURE — 3008F PR BODY MASS INDEX (BMI) DOCUMENTED: ICD-10-PCS | Mod: CPTII,S$GLB,, | Performed by: FAMILY MEDICINE

## 2022-11-07 PROCEDURE — 99999 PR PBB SHADOW E&M-EST. PATIENT-LVL III: ICD-10-PCS | Mod: PBBFAC,,, | Performed by: FAMILY MEDICINE

## 2022-11-07 PROCEDURE — 3078F DIAST BP <80 MM HG: CPT | Mod: CPTII,S$GLB,, | Performed by: FAMILY MEDICINE

## 2022-11-07 PROCEDURE — 3074F PR MOST RECENT SYSTOLIC BLOOD PRESSURE < 130 MM HG: ICD-10-PCS | Mod: CPTII,S$GLB,, | Performed by: FAMILY MEDICINE

## 2022-11-07 PROCEDURE — 1159F MED LIST DOCD IN RCRD: CPT | Mod: CPTII,S$GLB,, | Performed by: FAMILY MEDICINE

## 2022-11-07 PROCEDURE — 3078F PR MOST RECENT DIASTOLIC BLOOD PRESSURE < 80 MM HG: ICD-10-PCS | Mod: CPTII,S$GLB,, | Performed by: FAMILY MEDICINE

## 2022-11-07 RX ORDER — TRAZODONE HYDROCHLORIDE 50 MG/1
50 TABLET ORAL NIGHTLY
Qty: 90 TABLET | Refills: 3 | Status: SHIPPED | OUTPATIENT
Start: 2022-11-07 | End: 2023-02-10 | Stop reason: DRUGHIGH

## 2022-11-11 ENCOUNTER — CLINICAL SUPPORT (OUTPATIENT)
Dept: REHABILITATION | Facility: HOSPITAL | Age: 62
End: 2022-11-11
Attending: FAMILY MEDICINE
Payer: COMMERCIAL

## 2022-11-11 DIAGNOSIS — M41.26 OTHER IDIOPATHIC SCOLIOSIS, LUMBAR REGION: ICD-10-CM

## 2022-11-11 PROCEDURE — 97161 PT EVAL LOW COMPLEX 20 MIN: CPT | Mod: PO

## 2022-11-11 PROCEDURE — 97110 THERAPEUTIC EXERCISES: CPT | Mod: PO

## 2022-11-11 PROCEDURE — 97112 NEUROMUSCULAR REEDUCATION: CPT | Mod: PO

## 2022-11-11 NOTE — PROGRESS NOTES
[facility]  Physical Therapy Initial Evaluation    Name: Mely Viera  Clinic Number: [id number]    Therapy Diagnosis:   Encounter Diagnosis   Name Primary?    Other idiopathic scoliosis, lumbar region      Physician: Elisha Street MD    Physician Orders: PT Eval and Treat  Medical Diagnosis from Referral: Other idiopathic scoliosis, lumbar region [M41.26]  Evaluation Date: 11/11/2022  Authorization Period Expiration: 11/7/2022 - 12/31/2022  Plan of Care Expiration: 1/25/22  Visit # / Visits authorized: 1/ 1    Time In: 1400  Time Out: 1500  Total Billable Time: 60 minutes    Precautions: Standard    Subjective   Date of onset: late August   History of current condition - Mely reports: that she received a diagnosis of scoliosis mild, of the lumbar spine in late august when they were taking a CT scan of her abdomen and pelvis for constipation. Pt states she is in no pain at all throughout her body and exercises 2-3x/wk. States she is highly functional but does have insomnia. States that her PCP recommended PT to prevent further progression of scoliosis.        Past Medical History:   Diagnosis Date    Anxiety     Depression     Heat stroke     HSV (herpes simplex virus) infection     Sleep disturbance      Mely Viera  has a past surgical history that includes NONE.    Mely has a current medication list which includes the following prescription(s): alprazolam, flublok quad [id number] (pf), meclizine, trazodone, and valacyclovir.    Review of patient's allergies indicates:   Allergen Reactions    Shrimp Swelling     hives    Temazepam Swelling    Egg      Other reaction(s): Hives    Escitalopram      Other reaction(s): did not like effects        Imaging, x-ray 8/25/22  There is a mild dextroscoliosis of the lumbar spine.  Visualized lumbar spine demonstrates no signs for osteoblastic or osteolytic lesions or any acute fractures.  Pelvis also demonstrates no gross signs for any  osteoblastic or lytic lesions.     Impression:     1. Few diverticula in the sigmoid colon without definite signs for acute diverticulitis.  2. Noncontrast CT scan of the abdomen and pelvis otherwise is unremarkable.    Prior Therapy: none  Social History:  lives with their family    Pain:  Current 0/10, worst 0/10, best 0/10   Location: right back   Description: none      Objective       Lumbar Range of Motion:    Limitations Pain   Flexion 100%           Extension 100%           Left Side Bending 75%         Right Side Bending 100%             Lower Extremity Strength  Right LE  Left LE    Quadriceps: 5/5 Quadriceps: 5/5   Hamstrings: 5/5 Hamstrings: 5/5   Iliospoas: 5/5 Iliospoas: 5/5   Hip extension:  3+/5 Hip extension: 3+/5   PGM: 3+/5 PGM: 3+/5   Hip ER:  3+/5 Hip ER: 3+/5   Hip IR: 4+/5 Hip IR: 4+/5   Ankle dorsiflexion: 5/5 Ankle dorsiflexion: 5/5   Ankle plantarflexion: 5/5 Ankle plantarflexion: 5/5     Sensation: WFL    Reflexes:  -Patellar (L3-L4): 2+  -Achilles (S1): 2+    Special Tests:  -SLR Test: -    -Prone Instability Test: +  -Bridge Test: +  -Slump Test: - bilat     Joint Mobility: hypomobile Right FA jt.      Hip Range of Motion:   Right Passive  Left Passive   Flexion 95 95   Abduction     Extension     Ext. Rotation 73 75   Int. Rotation 50 70        CMS Impairment/Limitation/Restriction for FOTO lumbar Survey    Therapist reviewed FOTO scores for Mely Viera on 11/11/2022.   FOTO documents entered into [employer] - see Media section.    Limitation Score: 94%  Category: Mobility         TREATMENT     Total Treatment time separate from Evaluation: 38 minutes    Mely received therapeutic exercises for 24 minutes including:  Pt edu on HEP, POC, PT eval findings, biomechanics, precautions, safety with ADL   Bridge x10   SL clam 2x5 5s     Mely received the following manual therapy techniques:  for 5 minutes, including:  Left FA jt. Gr3-4 post/inf mob     Mely participated in neuromuscular  re-education activities to improve:  for 9 minutes. The following activities were included:  Saggitall plane and front plane pelvic control  Bridged x3  Sl calm x5 ea side        Home Exercises and Patient Education Provided    Education provided re: precautions, PT eval findings, biomechanics, safety with ADLs, form with activities, POC, goals for therapy, role of therapy for care, exercises/HEP    Written Home Exercises Provided: yes  Exercises were reviewed and Mely was able to demonstrate them prior to the end of the session.   Pt received a written copy of exercises to perform at home. Mely demonstrated good understanding of the education provided.     See EMR under patient instructions for exercises given.   Assessment   Mely is a 62 y.o. female referred to outpatient Physical Therapy with a medical diagnosis of Other idiopathic scoliosis, lumbar region [M41.26]. Pt presents with Other idiopathic scoliosis, lumbar region [M41.26]. Pt has deficits in decreased range of motion and decreased strength and endurance . This does not limit patients ability to function and perform recreational activities. Pt is a not a candidate for skilled physical therapy treatment and would better benefit from a strength and conditioning program with a  or strength  to promote core stability, glute strength/endurance and gross functional patterns such as step ups, squats, and hip hinge patter. Patient education on decision and agrees with POC. Will follow up in 5 weeks to ensure consistency of HEP, improved strength/endurance, and progress HEP.     Pt prognosis is Excellent.   Pt will benefit from skilled outpatient Physical Therapy to address the deficits stated above and in the chart below, provide pt/family education, and to maximize pt's level of independence.     Plan of care discussed with patient: yes  Pt's spiritual, cultural and educational needs considered and patient is agreeable to the plan  of care and goals as stated below:     Anticipated Barriers for therapy: none    Medical Necessity is demonstrated by the following  History  Co-morbidities and personal factors that may impact the plan of care Co-morbidities:   insomnia    Personal Factors:   no deficits     low   Examination  Body Structures and Functions, activity limitations and participation restrictions that may impact the plan of care Body Regions:   back    Body Systems:    strength  gross coordinated movement    Participation Restrictions:   \none    Activity limitations:   Learning and applying knowledge  no deficits    General Tasks and Commands  no deficits    Communication  no deficits    Mobility  no deficits    Self care  no deficits    Domestic Life  no deficits    Interactions/Relationships  no deficits    Life Areas  no deficits    Community and Social Life  no deficits         low   Clinical Presentation stable and uncomplicated low   Decision Making/ Complexity Score: low     Goals:  Long Term Goals: 5-6 weeks   Pt will be able to demonstrate >65s side plank bilaterally.  Pt will be able to demonstrate BW squat and step up 12'' box with good form.  Pt will be able to demonstrate bridging and SL clam with good form x10 5s with training effect in glutes achieved  Pt will subjectively report confidence and good routine with no irritation.     Plan   Plan of care Certification: 11/11/2022 to 12/19/22.    Outpatient Physical Therapy 1 times weekly for 1 weeks to include the following interventions: Electrical Stimulation, Manual Therapy, Moist Heat/ Ice, Neuromuscular Re-ed, Patient Education, Self Care, Therapeutic Activities, and Therapeutic Exercise    Saida Cruz, PT, DPT

## 2022-11-13 NOTE — PROGRESS NOTES
Assessment:       1. Other insomnia    2. Other idiopathic scoliosis, lumbar region    3. Diverticulosis          Plan:        Other insomnia:  Worsening  -     traZODone (DESYREL) 50 MG tablet; Take 1 tablet (50 mg total) by mouth every evening.  Dispense: 90 tablet; Refill: 3    Other idiopathic scoliosis, lumbar region:  Worsening  -     Ambulatory referral/consult to Physical/Occupational Therapy; Future; Expected date: 11/14/2022    Diverticulosis:  Stable     Recommend to eat healthy, avoid processed foods and processed starches, will start patient on trazodone to take at bedtime for insomnia, will refer the patient to physical therapy secondary to that pain.  The patient was advised to avoid to get constipated.  The patient's BMI has been recorded in the chart. The patient has been provided educational materials regarding the benefits of attaining and maintaining a normal weight. We will continue to address and follow this issue during follow up visits.   Patient agreed with assessment and plan. Patient verbalized understanding.     Subjective:       Patient ID: Mely Viera is a 62 y.o. female.    Chief Complaint: Follow-up insomnia    HPI    Insomnia:  The patient stated that she has been and blood of stress, she feels anxious, she is not able to sleep at night, she is taking Xanax but does not seem to work, the patient would like to try something different.  The patient stated that she is not losing more weight she is stable on her weight.  The patient denies any symptoms of chest pain shortness of breath.    Scoliosis:  Complains of pain on the back, mid back, lower back, the symptoms are getting worse, the patient has scoliosis, she would like a referral for therapy.  The patient is coming here for assessment.    Diverticulosis:  The patient has diverticulosis, she would like to know what she can do in order to avoid this problem.    Past medical history, past social history was reviewed and discussed  with the patient.    Review of Systems   Constitutional:  Negative for activity change and appetite change.   HENT:  Negative for ear discharge.    Eyes:  Negative for discharge and itching.   Respiratory:  Negative for choking and chest tightness.    Cardiovascular:  Negative for palpitations and leg swelling.   Gastrointestinal:  Negative for abdominal distention and constipation.   Endocrine: Negative for cold intolerance and heat intolerance.   Genitourinary:  Negative for dysuria and flank pain.   Musculoskeletal:  Negative for back pain.   Skin:  Negative for pallor.   Allergic/Immunologic: Negative for environmental allergies and food allergies.   Neurological:  Negative for dizziness and facial asymmetry.   Hematological:  Negative for adenopathy. Does not bruise/bleed easily.   Psychiatric/Behavioral:  Positive for dysphoric mood and sleep disturbance. Negative for agitation, confusion and decreased concentration. The patient is nervous/anxious.      Objective:      Physical Exam  Vitals and nursing note reviewed.   Constitutional:       General: She is not in acute distress.     Appearance: Normal appearance. She is well-developed. She is not diaphoretic.   HENT:      Head: Normocephalic and atraumatic.      Right Ear: External ear normal.      Left Ear: External ear normal.      Nose: Nose normal.      Mouth/Throat:      Pharynx: No oropharyngeal exudate.   Eyes:      General: No scleral icterus.        Right eye: No discharge.         Left eye: No discharge.      Conjunctiva/sclera: Conjunctivae normal.      Pupils: Pupils are equal, round, and reactive to light.   Cardiovascular:      Rate and Rhythm: Normal rate and regular rhythm.      Heart sounds: Normal heart sounds.   Pulmonary:      Effort: Pulmonary effort is normal. No respiratory distress.      Breath sounds: Normal breath sounds. No wheezing.   Abdominal:      General: Bowel sounds are normal. There is no distension.      Palpations: There is  no mass.      Tenderness: There is no abdominal tenderness. There is no guarding.   Musculoskeletal:         General: No tenderness or deformity.      Cervical back: Neck supple.   Skin:     Coloration: Skin is not pale.      Findings: No erythema.   Neurological:      Mental Status: She is alert.   Psychiatric:         Behavior: Behavior normal.         Thought Content: Thought content normal.         Judgment: Judgment normal.

## 2022-11-15 PROBLEM — N81.10 CYSTOCELE WITH RECTOCELE: Status: ACTIVE | Noted: 2022-11-15

## 2022-11-15 PROBLEM — M62.89 PELVIC FLOOR DYSFUNCTION: Status: ACTIVE | Noted: 2022-11-15

## 2022-11-15 PROBLEM — N81.6 CYSTOCELE WITH RECTOCELE: Status: ACTIVE | Noted: 2022-11-15

## 2022-11-15 PROBLEM — R53.1 WEAKNESS: Status: ACTIVE | Noted: 2022-11-15

## 2022-11-15 PROBLEM — N39.46 MIXED STRESS AND URGE URINARY INCONTINENCE: Status: ACTIVE | Noted: 2022-11-15

## 2022-12-01 ENCOUNTER — TELEPHONE (OUTPATIENT)
Dept: GASTROENTEROLOGY | Facility: CLINIC | Age: 62
End: 2022-12-01
Payer: COMMERCIAL

## 2022-12-01 NOTE — TELEPHONE ENCOUNTER
[facility]mail asking pt to call clinic if they had any questions regarding prep for upcoming procedure. Call back # provided

## 2022-12-02 RX ORDER — ESZOPICLONE 2 MG/1
2 TABLET, FILM COATED ORAL NIGHTLY
COMMUNITY
End: 2023-05-16 | Stop reason: ALTCHOICE

## 2022-12-06 ENCOUNTER — HOSPITAL ENCOUNTER (OUTPATIENT)
Facility: HOSPITAL | Age: 62
Discharge: HOME | End: 2022-12-06
Attending: INTERNAL MEDICINE | Admitting: INTERNAL MEDICINE
Payer: COMMERCIAL

## 2022-12-06 ENCOUNTER — ANESTHESIA (OUTPATIENT)
Dept: ENDOSCOPY | Facility: HOSPITAL | Age: 62
End: 2022-12-06
Payer: COMMERCIAL

## 2022-12-06 ENCOUNTER — ANESTHESIA EVENT (OUTPATIENT)
Dept: ENDOSCOPY | Facility: HOSPITAL | Age: 62
End: 2022-12-06
Payer: COMMERCIAL

## 2022-12-06 DIAGNOSIS — R63.4 WEIGHT LOSS: ICD-10-CM

## 2022-12-06 PROCEDURE — 25000003 PHARM REV CODE 250: Mod: PO | Performed by: NURSE ANESTHETIST, CERTIFIED REGISTERED

## 2022-12-06 PROCEDURE — 88305 TISSUE EXAM BY PATHOLOGIST: ICD-10-PCS | Mod: 26,,, | Performed by: PATHOLOGY

## 2022-12-06 PROCEDURE — 27201012 HC FORCEPS, HOT/COLD, DISP: Mod: PO | Performed by: INTERNAL MEDICINE

## 2022-12-06 PROCEDURE — 88305 TISSUE EXAM BY PATHOLOGIST: CPT | Performed by: PATHOLOGY

## 2022-12-06 PROCEDURE — 45378 DIAGNOSTIC COLONOSCOPY: CPT | Mod: PO | Performed by: INTERNAL MEDICINE

## 2022-12-06 PROCEDURE — 37000009 HC ANESTHESIA EA ADD 15 MINS: Mod: PO | Performed by: INTERNAL MEDICINE

## 2022-12-06 PROCEDURE — 45378 PR COLONOSCOPY,DIAGNOSTIC: ICD-10-PCS | Mod: ,,, | Performed by: INTERNAL MEDICINE

## 2022-12-06 PROCEDURE — 88305 TISSUE EXAM BY PATHOLOGIST: CPT | Mod: 26,,, | Performed by: PATHOLOGY

## 2022-12-06 PROCEDURE — D9220A PRA ANESTHESIA: Mod: ANES,,, | Performed by: ANESTHESIOLOGY

## 2022-12-06 PROCEDURE — 43239 PR EGD, FLEX, W/BIOPSY, SGL/MULTI: ICD-10-PCS | Mod: 51,,, | Performed by: INTERNAL MEDICINE

## 2022-12-06 PROCEDURE — D9220A PRA ANESTHESIA: ICD-10-PCS | Mod: CRNA,,, | Performed by: NURSE ANESTHETIST, CERTIFIED REGISTERED

## 2022-12-06 PROCEDURE — 63600175 PHARM REV CODE 636 W HCPCS: Mod: PO | Performed by: NURSE ANESTHETIST, CERTIFIED REGISTERED

## 2022-12-06 PROCEDURE — 43239 EGD BIOPSY SINGLE/MULTIPLE: CPT | Mod: PO | Performed by: INTERNAL MEDICINE

## 2022-12-06 PROCEDURE — D9220A PRA ANESTHESIA: Mod: CRNA,,, | Performed by: NURSE ANESTHETIST, CERTIFIED REGISTERED

## 2022-12-06 PROCEDURE — 43239 EGD BIOPSY SINGLE/MULTIPLE: CPT | Mod: 51,,, | Performed by: INTERNAL MEDICINE

## 2022-12-06 PROCEDURE — D9220A PRA ANESTHESIA: ICD-10-PCS | Mod: ANES,,, | Performed by: ANESTHESIOLOGY

## 2022-12-06 PROCEDURE — 63600175 PHARM REV CODE 636 W HCPCS: Mod: PO | Performed by: INTERNAL MEDICINE

## 2022-12-06 PROCEDURE — 37000008 HC ANESTHESIA 1ST 15 MINUTES: Mod: PO | Performed by: INTERNAL MEDICINE

## 2022-12-06 PROCEDURE — 45378 DIAGNOSTIC COLONOSCOPY: CPT | Mod: ,,, | Performed by: INTERNAL MEDICINE

## 2022-12-06 RX ORDER — SODIUM CHLORIDE, SODIUM LACTATE, POTASSIUM CHLORIDE, CALCIUM CHLORIDE 600; 310; 30; 20 MG/100ML; MG/100ML; MG/100ML; MG/100ML
INJECTION, SOLUTION INTRAVENOUS CONTINUOUS
Status: DISCONTINUED | OUTPATIENT
Start: 2022-12-06 | End: 2022-12-06 | Stop reason: HOSPADM

## 2022-12-06 RX ORDER — LIDOCAINE HCL/PF 100 MG/5ML
SYRINGE (ML) INTRAVENOUS
Status: DISCONTINUED | OUTPATIENT
Start: 2022-12-06 | End: 2022-12-06

## 2022-12-06 RX ORDER — SODIUM CHLORIDE 0.9 % (FLUSH) 0.9 %
10 SYRINGE (ML) INJECTION
Status: DISCONTINUED | OUTPATIENT
Start: 2022-12-06 | End: 2022-12-06 | Stop reason: HOSPADM

## 2022-12-06 RX ORDER — PROPOFOL 10 MG/ML
VIAL (ML) INTRAVENOUS
Status: DISCONTINUED | OUTPATIENT
Start: 2022-12-06 | End: 2022-12-06

## 2022-12-06 RX ADMIN — PROPOFOL 50 MG: 10 INJECTION, EMULSION INTRAVENOUS at 10:12

## 2022-12-06 RX ADMIN — SODIUM CHLORIDE, SODIUM LACTATE, POTASSIUM CHLORIDE, AND CALCIUM CHLORIDE: .6; .31; .03; .02 INJECTION, SOLUTION INTRAVENOUS at 09:12

## 2022-12-06 RX ADMIN — LIDOCAINE HYDROCHLORIDE 100 MG: 20 INJECTION, SOLUTION INTRAVENOUS at 10:12

## 2022-12-06 RX ADMIN — PROPOFOL 100 MG: 10 INJECTION, EMULSION INTRAVENOUS at 10:12

## 2022-12-06 NOTE — ANESTHESIA PREPROCEDURE EVALUATION
12/06/2022  Mely Viera is a 62 y.o., female.      Pre-op Assessment    I have reviewed the Patient Summary Reports.     I have reviewed the Nursing Notes. I have reviewed the NPO Status.   I have reviewed the Medications.     Review of Systems  Anesthesia Hx:  No problems with previous Anesthesia Denies Hx of Anesthetic complications    Social:  Non-Smoker    Cardiovascular:   Denies Hypertension.  Denies MI.  Denies CAD.    Denies CABG/stent.   Denies Angina. NSVT (nonsustained ventricular tachycardia)   Pulmonary:   Denies COPD.  Denies Asthma.  Denies Recent URI.    Renal/:   Denies Chronic Renal Disease.     Hepatic/GI:   Denies GERD. Denies Liver Disease.    Neurological:   Denies TIA. Denies CVA. Denies Seizures.    Endocrine:   Denies Diabetes. Denies Hypothyroidism.    Psych:   Denies Psychiatric History.          Physical Exam  General: Well nourished, Cooperative, Alert and Oriented    Airway:  Mallampati: II / II  Mouth Opening: Normal  TM Distance: 4 - 6 cm  Tongue: Normal    Dental:  Intact    Chest/Lungs:  Clear to auscultation, Normal Respiratory Rate    Heart:  Rate: Normal  Rhythm: Regular Rhythm  Sounds: Normal        Anesthesia Plan  Type of Anesthesia, risks & benefits discussed:    Anesthesia Type: Gen Natural Airway  Intra-op Monitoring Plan: Standard ASA Monitors  Induction:  IV  Informed Consent: Informed consent signed with the Patient and all parties understand the risks and agree with anesthesia plan.  All questions answered.   ASA Score: 3    Ready For Surgery From Anesthesia Perspective.     .

## 2022-12-06 NOTE — PROVATION PATIENT INSTRUCTIONS
Discharge Summary/Instructions after an Endoscopic Procedure  Patient Name: Mely Viera  Patient MRN: [id number]  Patient YOB: 1960  Tuesday, December 6, 2022  Damien Johnson MD  Dear patient,  As a result of recent federal legislation (The Federal Cures Act), you may   receive lab or pathology results from your procedure in your [facility]   account before your physician is able to contact you. Your physician or   their representative will relay the results to you with their   recommendations at their soonest availability.  Thank you,  RESTRICTIONS:  During your procedure today, you received medications for sedation.  These   medications may affect your judgment, balance and coordination.  Therefore,   for 24 hours, you have the following restrictions:   - DO NOT drive a car, operate machinery, make legal/financial decisions,   sign important papers or drink alcohol.    ACTIVITY:  Today: no heavy lifting, straining or running due to procedural   sedation/anesthesia.  The following day: return to full activity including work.  DIET:  Eat and drink normally unless instructed otherwise.     TREATMENT FOR COMMON SIDE EFFECTS:  - Mild abdominal pain, nausea, belching, bloating or excessive gas:  rest,   eat lightly and use a heating pad.  - Sore Throat: treat with throat lozenges and/or gargle with warm salt   water.  - Because air was used during the procedure, expelling large amounts of air   from your rectum or belching is normal.  - If a bowel prep was taken, you may not have a bowel movement for 1-3 days.    This is normal.  SYMPTOMS TO WATCH FOR AND REPORT TO YOUR PHYSICIAN:  1. Abdominal pain or bloating, other than gas cramps.  2. Chest pain.  3. Back pain.  4. Signs of infection such as: chills or fever occurring within 24 hours   after the procedure.  5. Rectal bleeding, which would show as bright red, maroon, or black stools.   (A tablespoon of blood from the rectum is not serious,  especially if   hemorrhoids are present.)  6. Vomiting.  7. Weakness or dizziness.  GO DIRECTLY TO THE NEAREST EMERGENCY ROOM IF YOU HAVE ANY OF THE FOLLOWING:      Difficulty breathing              Chills and/or fever over 101 F   Persistent vomiting and/or vomiting blood   Severe abdominal pain   Severe chest pain   Black, tarry stools   Bleeding- more than one tablespoon   Any other symptom or condition that you feel may need urgent attention  Your doctor recommends these additional instructions:  If any biopsies were taken, your doctors clinic will contact you in 1 to 2   weeks with any results.  We are waiting for your pathology results.   Continue your present medications.   You are being discharged to home.  For questions, problems or results please call your physician - Damien Johnson MD at Work:  [phone].  EMERGENCY PHONE NUMBER: [phone], LAB RESULTS: [phone]  IF A COMPLICATION OR EMERGENCY SITUATION ARISES AND YOU ARE UNABLE TO REACH   YOUR PHYSICIAN - GO DIRECTLY TO THE EMERGENCY ROOM.  ___________________________________________  Nurse Signature  ___________________________________________  Patient/Designated Responsible Party Signature  Damien Johnson MD  12/6/2022 10:24:41 AM  This report has been verified and signed electronically.  Dear patient,  As a result of recent federal legislation (The Federal Cures Act), you may   receive lab or pathology results from your procedure in your [facility]   account before your physician is able to contact you. Your physician or   their representative will relay the results to you with their   recommendations at their soonest availability.  Thank you.  PROVATION

## 2022-12-06 NOTE — H&P
History & Physical - Short Stay  Gastroenterology      SUBJECTIVE:     Procedure: Colonoscopy and EGD    Chief Complaint/Indication for Procedure: Abdominal Pain and Weight Loss    PTA Medications   Medication Sig    ALPRAZolam (XANAX) 0.25 MG tablet TAKE 1 TABLET(0.25 MG) BY MOUTH EVERY NIGHT AS NEEDED FOR INSOMNIA OR ANXIETY    eszopiclone (LUNESTA) 2 MG Tab Take 2 mg by mouth every evening.    traZODone (DESYREL) 50 MG tablet Take 1 tablet (50 mg total) by mouth every evening.    valACYclovir (VALTREX) 500 MG tablet Take 1 tablet (500 mg total) by mouth once daily.    flu vac qv 2022,18yr up,rcm-PF (FLUBLOK QUAD 0658-8675, PF,) 180 mcg (45 mcg x 4)/0.5 mL Syrg Inject 0.5 mL for 1 dose       Review of patient's allergies indicates:   Allergen Reactions    Shrimp Swelling     hives    Temazepam Swelling    Egg      Other reaction(s): Hives    Escitalopram      Other reaction(s): did not like effects        Past Medical History:   Diagnosis Date    Anxiety     Depression     Heat stroke     HSV (herpes simplex virus) infection     Sleep disturbance      Past Surgical History:   Procedure Laterality Date    NONE      TONSILLECTOMY  1984     Family History   Problem Relation Age of Onset    Bladder Cancer Father     Diabetes Sister     Hypertension Sister     Crohn's disease Sister     Breast cancer Maternal Aunt 49    Ovarian cancer Paternal Cousin 65    COPD Neg Hx     Stomach cancer Neg Hx     Esophageal cancer Neg Hx     Ulcerative colitis Neg Hx     Celiac disease Neg Hx      Social History     Tobacco Use    Smoking status: Never    Smokeless tobacco: Never   Substance Use Topics    Alcohol use: Not Currently     Alcohol/week: 0.0 - 2.0 standard drinks     Comment: less than once a week    Drug use: No         OBJECTIVE:     Vital Signs (Most Recent)       Physical Exam:                                                       GENERAL:  Comfortable, in no acute distress.                                 HEENT EXAM:   Nonicteric.  No adenopathy.  Oropharynx is clear.               NECK:  Supple.                                                               LUNGS:  Clear.                                                               CARDIAC:  Regular rate and rhythm.  S1, S2.  No murmur.                      ABDOMEN:  Soft, positive bowel sounds, nontender.  No hepatosplenomegaly or masses.  No rebound or guarding.                                             EXTREMITIES:  No edema.     MENTAL STATUS:  Normal, alert and oriented.      ASSESSMENT/PLAN:     Assessment: Abdominal Pain and Weight Loss    Plan: Colonoscopy and EGD    Anesthesia Plan: General    ASA Grade: ASA 2 - Patient with mild systemic disease with no functional limitations    MALLAMPATI SCORE:  I (soft palate, uvula, fauces, and tonsillar pillars visible)

## 2022-12-06 NOTE — TRANSFER OF CARE
"Anesthesia Transfer of Care Note    Patient: Mely Viera    Procedure(s) Performed: Procedure(s) (LRB):  EGD (ESOPHAGOGASTRODUODENOSCOPY) (N/A)  COLONOSCOPY (N/A)    Patient location: GI    Anesthesia Type: general    Transport from OR: Transported from OR on room air with adequate spontaneous ventilation    Post pain: adequate analgesia    Post assessment: no apparent anesthetic complications and tolerated procedure well    Post vital signs: stable    Level of consciousness: awake, alert and oriented    Nausea/Vomiting: no nausea/vomiting    Complications: none    Transfer of care protocol was followed      Last vitals:   Visit Vitals  /63   Pulse 81   Temp 36.7 °C (98.1 °F)   Resp 16   Ht 5' 7" (1.702 m)   Wt 53.5 kg (118 lb)   LMP 08/11/2014   SpO2 100%   Breastfeeding No   BMI 18.48 kg/m²     "

## 2022-12-06 NOTE — PROVATION PATIENT INSTRUCTIONS
Discharge Summary/Instructions after an Endoscopic Procedure  Patient Name: Mely Viera  Patient MRN: [id number]  Patient YOB: 1960  Tuesday, December 6, 2022  Damien Johnson MD  Dear patient,  As a result of recent federal legislation (The Federal Cures Act), you may   receive lab or pathology results from your procedure in your [facility]   account before your physician is able to contact you. Your physician or   their representative will relay the results to you with their   recommendations at their soonest availability.  Thank you,  RESTRICTIONS:  During your procedure today, you received medications for sedation.  These   medications may affect your judgment, balance and coordination.  Therefore,   for 24 hours, you have the following restrictions:   - DO NOT drive a car, operate machinery, make legal/financial decisions,   sign important papers or drink alcohol.    ACTIVITY:  Today: no heavy lifting, straining or running due to procedural   sedation/anesthesia.  The following day: return to full activity including work.  DIET:  Eat and drink normally unless instructed otherwise.     TREATMENT FOR COMMON SIDE EFFECTS:  - Mild abdominal pain, nausea, belching, bloating or excessive gas:  rest,   eat lightly and use a heating pad.  - Sore Throat: treat with throat lozenges and/or gargle with warm salt   water.  - Because air was used during the procedure, expelling large amounts of air   from your rectum or belching is normal.  - If a bowel prep was taken, you may not have a bowel movement for 1-3 days.    This is normal.  SYMPTOMS TO WATCH FOR AND REPORT TO YOUR PHYSICIAN:  1. Abdominal pain or bloating, other than gas cramps.  2. Chest pain.  3. Back pain.  4. Signs of infection such as: chills or fever occurring within 24 hours   after the procedure.  5. Rectal bleeding, which would show as bright red, maroon, or black stools.   (A tablespoon of blood from the rectum is not serious,  especially if   hemorrhoids are present.)  6. Vomiting.  7. Weakness or dizziness.  GO DIRECTLY TO THE NEAREST EMERGENCY ROOM IF YOU HAVE ANY OF THE FOLLOWING:      Difficulty breathing              Chills and/or fever over 101 F   Persistent vomiting and/or vomiting blood   Severe abdominal pain   Severe chest pain   Black, tarry stools   Bleeding- more than one tablespoon   Any other symptom or condition that you feel may need urgent attention  Your doctor recommends these additional instructions:  If any biopsies were taken, your doctors clinic will contact you in 1 to 2   weeks with any results.  Your physician has recommended a repeat colonoscopy in 10 years for   screening purposes.   You are being discharged to home.  For questions, problems or results please call your physician - Damien Johnson MD at Work:  [phone].  EMERGENCY PHONE NUMBER: [phone], LAB RESULTS: [phone]  IF A COMPLICATION OR EMERGENCY SITUATION ARISES AND YOU ARE UNABLE TO REACH   YOUR PHYSICIAN - GO DIRECTLY TO THE EMERGENCY ROOM.  ___________________________________________  Nurse Signature  ___________________________________________  Patient/Designated Responsible Party Signature  Damien Johnson MD  12/6/2022 10:40:14 AM  This report has been verified and signed electronically.  Dear patient,  As a result of recent federal legislation (The Federal Cures Act), you may   receive lab or pathology results from your procedure in your [facility]   account before your physician is able to contact you. Your physician or   their representative will relay the results to you with their   recommendations at their soonest availability.  Thank you.  PROVATION

## 2022-12-06 NOTE — DISCHARGE SUMMARY
[place] - Endoscopy  Discharge Note  Short Stay  Discharge Note  Short Stay      SUMMARY     Admit Date: 12/6/2022    Attending Physician: Damien Johnson MD     Discharge Physician: Damien Johnson MD    Discharge Date: 12/6/2022 10:40 AM    Final Diagnosis: Weight loss [R63.4]  History of abdominal pain [Z87.898]  Family history of Crohn's disease [Z83.79]    Disposition: HOME OR SELF CARE    Patient Instructions:   Current Discharge Medication List        CONTINUE these medications which have NOT CHANGED    Details   ALPRAZolam (XANAX) 0.25 MG tablet TAKE 1 TABLET(0.25 MG) BY MOUTH EVERY NIGHT AS NEEDED FOR INSOMNIA OR ANXIETY  Qty: 30 tablet, Refills: 5    Associated Diagnoses: Anxiety      eszopiclone (LUNESTA) 2 MG Tab Take 2 mg by mouth every evening.      traZODone (DESYREL) 50 MG tablet Take 1 tablet (50 mg total) by mouth every evening.  Qty: 90 tablet, Refills: 3    Associated Diagnoses: Other insomnia      valACYclovir (VALTREX) 500 MG tablet Take 1 tablet (500 mg total) by mouth once daily.  Qty: 90 tablet, Refills: 3    Associated Diagnoses: Genital herpes simplex, unspecified site; HSV (herpes simplex virus) anogenital infection      flu vac qv 2022,18yr up,rcm-PF (FLUBLOK QUAD 4552-1486, PF,) 180 mcg (45 mcg x 4)/0.5 mL Syrg Inject 0.5 mL for 1 dose  Qty: 0.5 mL, Refills: 0             Discharge Procedure Orders (must include Diet, Follow-up, Activity)    Follow Up:  Follow up with PCP as previously scheduled  Resume routine diet.  Activity as tolerated.    No driving day of procedure.

## 2022-12-07 VITALS
SYSTOLIC BLOOD PRESSURE: 113 MMHG | OXYGEN SATURATION: 99 % | BODY MASS INDEX: 18.52 KG/M2 | DIASTOLIC BLOOD PRESSURE: 60 MMHG | RESPIRATION RATE: 16 BRPM | HEART RATE: 80 BPM | TEMPERATURE: 98 F | WEIGHT: 118 LBS | HEIGHT: 67 IN

## 2022-12-07 NOTE — ANESTHESIA POSTPROCEDURE EVALUATION
Anesthesia Post Evaluation    Patient: Mely Viera    Procedure(s) Performed: Procedure(s) (LRB):  EGD (ESOPHAGOGASTRODUODENOSCOPY) (N/A)  COLONOSCOPY (N/A)    Final Anesthesia Type: general      Patient location during evaluation: PACU  Patient participation: Yes- Able to Participate  Level of consciousness: awake and alert and oriented  Post-procedure vital signs: reviewed and stable  Pain management: adequate  Airway patency: patent    PONV status at discharge: No PONV  Anesthetic complications: no      Cardiovascular status: blood pressure returned to baseline  Respiratory status: unassisted, spontaneous ventilation and room air  Hydration status: euvolemic  Follow-up not needed.          Vitals Value Taken Time   /60 12/06/22 1052   Temp 36.7 °C (98.1 °F) 12/06/22 1041   Pulse 80 12/06/22 1052   Resp 16 12/06/22 1052   SpO2 99 % 12/06/22 1052         Event Time   Out of Recovery 11:17:15         Pain/Suzie Score: Suzie Score: 10 (12/6/2022 10:53 AM)

## 2022-12-19 LAB
FINAL PATHOLOGIC DIAGNOSIS: NORMAL
GROSS: NORMAL
Lab: NORMAL
MICROSCOPIC EXAM: NORMAL

## 2023-01-10 NOTE — PROGRESS NOTES
Subjective:       Patient ID: Mely Viera is a 57 y.o. female.    Chief Complaint: Heat Exposure (follow up/ heat exhaustion); Establish Care; and cardiologist referral     The patient states that approximately 1 week ago was transfer to the local ED and was hospitalized secondary to syncopal episode and heat exhaustion. Patient loss consciousness for less than 1 minutes, while in the hospital she found to be dehydrated and received IV fluids. She also had an EKG and was told that she had an irregular heart beat. Patient states that since her hospital admission, her fatigue is improved and almost to the baseline levels. States also that she has sometimes dizziness spells and was worked up in the past for this problem and was told that everything was ok.    The patient also complains of anxiety and insomnia, the patient takes occasional Xanax, and states the prescription for 30 days last her more than 6 months. The patient states that multiple medicines were tried in the past without improvement by previous physician. She denies any episodes of chest pain, panic attacks, diarrhea or constipation issues.    HPI  Review of Systems   Constitutional: Positive for activity change and fatigue. Negative for appetite change, chills and fever.   Eyes: Negative for discharge and itching.   Respiratory: Negative for cough and chest tightness.    Cardiovascular: Positive for palpitations. Negative for chest pain and leg swelling.   Gastrointestinal: Negative for abdominal pain and anal bleeding.   Genitourinary: Negative for flank pain and frequency.   Neurological: Positive for dizziness and syncope.   Psychiatric/Behavioral: Positive for agitation and sleep disturbance.       Objective:      Physical Exam   Constitutional: She appears well-developed and well-nourished. No distress.   HENT:   Head: Normocephalic and atraumatic.   Right Ear: External ear normal.   Left Ear: External ear normal.   Nose: Nose normal.    Mouth/Throat: Oropharynx is clear and moist. No oropharyngeal exudate.   Eyes: Conjunctivae are normal. Pupils are equal, round, and reactive to light. Right eye exhibits no discharge. Left eye exhibits no discharge. No scleral icterus.   Neck: Neck supple. No JVD present. No tracheal deviation present. No thyromegaly present.   Cardiovascular: Normal rate, normal heart sounds and intact distal pulses.    No murmur heard.  Irregular rhythm   Pulmonary/Chest: Effort normal and breath sounds normal. No respiratory distress. She has no wheezes.   Abdominal: She exhibits no distension and no mass. There is no tenderness. There is no guarding.   Musculoskeletal: She exhibits no tenderness or deformity.   Lymphadenopathy:     She has no cervical adenopathy.   Neurological: She displays normal reflexes. No cranial nerve deficit. Coordination normal.   Skin: Capillary refill takes less than 2 seconds. No rash noted. She is not diaphoretic. No erythema. No pallor.   Nursing note and vitals reviewed.      Heat exhaustion, subsequent encounter: resolved    Syncope and collapse: Resolved  -     Comprehensive metabolic panel; Future; Expected date: 08/01/2018  -     Ambulatory referral to Cardiology  -     TSH; Future; Expected date: 08/01/2018    Irregular heart beat, new problem, work up needed  -     Ambulatory referral to Cardiology    Hypophosphatemia, new problem work up needed  -     PHOSPHORUS; Future; Expected date: 08/01/2018    Hypomagnesemia, new problem, work up needed  -     MAGNESIUM; Future; Expected date: 08/01/2018    Generalized anxiety disorder: stable, patient currently does not need the prescription for Alprazolam.    Other insomnia: Stable.        Will refer the patient to the cardiologist for further assessment, will release medical records from the [facility] in [place], patient discharge summary was reviewed and phospate, potassium and magnesium low, will need to be recheck. Will call patient after  results. Patient was advised to drink plenty of water, at least 64 oz daily, if any worsening of the symptoms to let us know immediately. ER warning  signs. Patient agreed with assessment and plan. Patient verbalized understanding.     Azelaic Acid Counseling: Patient counseled that medicine may cause skin irritation and to avoid applying near the eyes.  In the event of skin irritation, the patient was advised to reduce the amount of the drug applied or use it less frequently.   The patient verbalized understanding of the proper use and possible adverse effects of azelaic acid.  All of the patient's questions and concerns were addressed.

## 2023-01-18 ENCOUNTER — PATIENT MESSAGE (OUTPATIENT)
Dept: ADMINISTRATIVE | Facility: HOSPITAL | Age: 63
End: 2023-01-18
Payer: COMMERCIAL

## 2023-02-10 ENCOUNTER — OFFICE VISIT (OUTPATIENT)
Dept: FAMILY MEDICINE | Facility: CLINIC | Age: 63
End: 2023-02-10
Payer: COMMERCIAL

## 2023-02-10 VITALS
HEART RATE: 75 BPM | HEIGHT: 67 IN | BODY MASS INDEX: 18.1 KG/M2 | OXYGEN SATURATION: 98 % | WEIGHT: 115.31 LBS | SYSTOLIC BLOOD PRESSURE: 121 MMHG | DIASTOLIC BLOOD PRESSURE: 61 MMHG

## 2023-02-10 DIAGNOSIS — F41.9 ANXIETY: ICD-10-CM

## 2023-02-10 DIAGNOSIS — I20.9 ANGINA PECTORIS, UNSPECIFIED: Primary | ICD-10-CM

## 2023-02-10 DIAGNOSIS — I47.29 NSVT (NONSUSTAINED VENTRICULAR TACHYCARDIA): ICD-10-CM

## 2023-02-10 DIAGNOSIS — G47.09 OTHER INSOMNIA: ICD-10-CM

## 2023-02-10 PROCEDURE — 1159F PR MEDICATION LIST DOCUMENTED IN MEDICAL RECORD: ICD-10-PCS | Mod: CPTII,S$GLB,, | Performed by: FAMILY MEDICINE

## 2023-02-10 PROCEDURE — 3078F PR MOST RECENT DIASTOLIC BLOOD PRESSURE < 80 MM HG: ICD-10-PCS | Mod: CPTII,S$GLB,, | Performed by: FAMILY MEDICINE

## 2023-02-10 PROCEDURE — 3074F PR MOST RECENT SYSTOLIC BLOOD PRESSURE < 130 MM HG: ICD-10-PCS | Mod: CPTII,S$GLB,, | Performed by: FAMILY MEDICINE

## 2023-02-10 PROCEDURE — 99214 PR OFFICE/OUTPT VISIT, EST, LEVL IV, 30-39 MIN: ICD-10-PCS | Mod: S$GLB,,, | Performed by: FAMILY MEDICINE

## 2023-02-10 PROCEDURE — 99999 PR PBB SHADOW E&M-EST. PATIENT-LVL III: ICD-10-PCS | Mod: PBBFAC,,, | Performed by: FAMILY MEDICINE

## 2023-02-10 PROCEDURE — 3078F DIAST BP <80 MM HG: CPT | Mod: CPTII,S$GLB,, | Performed by: FAMILY MEDICINE

## 2023-02-10 PROCEDURE — 99999 PR PBB SHADOW E&M-EST. PATIENT-LVL III: CPT | Mod: PBBFAC,,, | Performed by: FAMILY MEDICINE

## 2023-02-10 PROCEDURE — 3008F BODY MASS INDEX DOCD: CPT | Mod: CPTII,S$GLB,, | Performed by: FAMILY MEDICINE

## 2023-02-10 PROCEDURE — 99214 OFFICE O/P EST MOD 30 MIN: CPT | Mod: S$GLB,,, | Performed by: FAMILY MEDICINE

## 2023-02-10 PROCEDURE — 3008F PR BODY MASS INDEX (BMI) DOCUMENTED: ICD-10-PCS | Mod: CPTII,S$GLB,, | Performed by: FAMILY MEDICINE

## 2023-02-10 PROCEDURE — 1159F MED LIST DOCD IN RCRD: CPT | Mod: CPTII,S$GLB,, | Performed by: FAMILY MEDICINE

## 2023-02-10 PROCEDURE — 3074F SYST BP LT 130 MM HG: CPT | Mod: CPTII,S$GLB,, | Performed by: FAMILY MEDICINE

## 2023-02-10 RX ORDER — TRAZODONE HYDROCHLORIDE 100 MG/1
100 TABLET ORAL NIGHTLY
Qty: 30 TABLET | Refills: 11 | Status: SHIPPED | OUTPATIENT
Start: 2023-02-10 | End: 2023-08-11

## 2023-02-11 NOTE — PROGRESS NOTES
Assessment:       1. Angina pectoris, unspecified    2. NSVT (nonsustained ventricular tachycardia)    3. Other insomnia    4. Anxiety          Plan:       Angina pectoris, unspecified:  Improved    NSVT (nonsustained ventricular tachycardia):  Improved    Other insomnia:  Stable  -     traZODone (DESYREL) 100 MG tablet; Take 1 tablet (100 mg total) by mouth every evening.  Dispense: 30 tablet; Refill: 11    Anxiety:  Stable         Will increase the dosage of the trazodone 100 mg at bedtime, [place] prescription monitoring program was checked and okay, Lunesta was prescribed for the patient to take as needed, also the patient was advised to not take Lunesta Xanax of the same time because of the risk of side effects.  Recommend the patient to drink plenty water, continue exercising, avoid refined sugars, meditation techniques.  The patient's BMI has been recorded in the chart. The patient has been provided educational materials regarding the benefits of attaining and maintaining a normal weight. We will continue to address and follow this issue during follow up visits.   Patient agreed with assessment and plan. Patient verbalized understanding.     Subjective:       Patient ID: Mely Viera is a 62 y.o. female.    Chief Complaint: Insomnia (/Follow up)    HPI    Insomnia:  The patient complains worsening symptoms of insomnia, the patient has been taking trazodone in help some her mood but is not helping for sleeping, she would like to try Lunesta, the patient also stated that Xanax does not work for her to help her sleep back count her down.  The patient stated that she feels very anxious but since she has been exercising and now she is in a new relationship, her symptoms are improved slightly.    Anxiety:  The patient takes Xanax as needed, she does not need a refill at this time.  She denies any side effects of the medication.    Chest pain:  The patient has intermittent chest pains, already seen by the  cardiologist, she was found to have SVT, not taking treatment for that, denies any worsening symptoms.  Denies any chest pain at this office visit.    Past medical history, past social history was reviewed and discussed with the patient.    Review of Systems   Constitutional:  Negative for activity change, appetite change and chills.   HENT:  Negative for congestion and ear discharge.    Eyes:  Negative for discharge and itching.   Respiratory:  Negative for choking and chest tightness.    Cardiovascular:  Positive for palpitations. Negative for leg swelling.   Gastrointestinal:  Negative for abdominal distention, abdominal pain and constipation.   Endocrine: Negative for cold intolerance and heat intolerance.   Genitourinary:  Negative for dysuria and flank pain.   Musculoskeletal:  Negative for arthralgias and back pain.   Skin:  Negative for pallor and rash.   Allergic/Immunologic: Negative for environmental allergies and food allergies.   Neurological:  Negative for dizziness, facial asymmetry and headaches.   Hematological:  Negative for adenopathy. Does not bruise/bleed easily.   Psychiatric/Behavioral:  Positive for dysphoric mood and sleep disturbance. Negative for agitation, confusion and decreased concentration. The patient is nervous/anxious.      Objective:      Physical Exam  Vitals and nursing note reviewed.   Constitutional:       General: She is not in acute distress.     Appearance: Normal appearance. She is well-developed. She is not diaphoretic.   HENT:      Head: Normocephalic and atraumatic.      Right Ear: External ear normal.      Left Ear: External ear normal.      Nose: Nose normal.   Eyes:      General: No scleral icterus.        Right eye: No discharge.         Left eye: No discharge.      Conjunctiva/sclera: Conjunctivae normal.   Cardiovascular:      Rate and Rhythm: Normal rate and regular rhythm.      Heart sounds: Normal heart sounds.   Pulmonary:      Effort: Pulmonary effort is  normal. No respiratory distress.      Breath sounds: Normal breath sounds. No wheezing.   Abdominal:      General: Abdomen is flat. Bowel sounds are normal. There is no distension.      Palpations: There is no mass.      Tenderness: There is no abdominal tenderness. There is no guarding.   Musculoskeletal:         General: No tenderness or deformity.      Cervical back: Neck supple.   Skin:     Coloration: Skin is not pale.      Findings: No erythema.   Neurological:      Mental Status: She is alert.      Cranial Nerves: No cranial nerve deficit.      Coordination: Coordination normal.   Psychiatric:         Behavior: Behavior normal.         Thought Content: Thought content normal.         Judgment: Judgment normal.

## 2023-04-26 ENCOUNTER — PATIENT MESSAGE (OUTPATIENT)
Dept: FAMILY MEDICINE | Facility: CLINIC | Age: 63
End: 2023-04-26
Payer: COMMERCIAL

## 2023-05-15 ENCOUNTER — PATIENT MESSAGE (OUTPATIENT)
Dept: FAMILY MEDICINE | Facility: CLINIC | Age: 63
End: 2023-05-15
Payer: COMMERCIAL

## 2023-05-15 DIAGNOSIS — F41.9 ANXIETY: ICD-10-CM

## 2023-05-16 RX ORDER — ZALEPLON 10 MG/1
10 CAPSULE ORAL NIGHTLY
Qty: 30 CAPSULE | Refills: 0 | Status: SHIPPED | OUTPATIENT
Start: 2023-05-16 | End: 2023-06-15

## 2023-05-16 RX ORDER — ALPRAZOLAM 0.25 MG/1
TABLET ORAL
Qty: 30 TABLET | Refills: 2 | Status: SHIPPED | OUTPATIENT
Start: 2023-05-16 | End: 2024-03-22 | Stop reason: SDUPTHER

## 2023-05-23 ENCOUNTER — OFFICE VISIT (OUTPATIENT)
Dept: OBSTETRICS AND GYNECOLOGY | Facility: CLINIC | Age: 63
End: 2023-05-23
Payer: COMMERCIAL

## 2023-05-23 VITALS
SYSTOLIC BLOOD PRESSURE: 110 MMHG | DIASTOLIC BLOOD PRESSURE: 68 MMHG | WEIGHT: 114.88 LBS | BODY MASS INDEX: 18.03 KG/M2 | HEIGHT: 67 IN

## 2023-05-23 DIAGNOSIS — N81.10 CYSTOCELE WITH RECTOCELE: Primary | ICD-10-CM

## 2023-05-23 DIAGNOSIS — R39.15 URINARY URGENCY: ICD-10-CM

## 2023-05-23 DIAGNOSIS — N81.6 CYSTOCELE WITH RECTOCELE: Primary | ICD-10-CM

## 2023-05-23 DIAGNOSIS — N39.41 URGE INCONTINENCE: ICD-10-CM

## 2023-05-23 PROCEDURE — 99213 OFFICE O/P EST LOW 20 MIN: CPT | Mod: S$GLB,,, | Performed by: OBSTETRICS & GYNECOLOGY

## 2023-05-23 PROCEDURE — 1159F PR MEDICATION LIST DOCUMENTED IN MEDICAL RECORD: ICD-10-PCS | Mod: CPTII,S$GLB,, | Performed by: OBSTETRICS & GYNECOLOGY

## 2023-05-23 PROCEDURE — 99999 PR PBB SHADOW E&M-EST. PATIENT-LVL III: CPT | Mod: PBBFAC,,, | Performed by: OBSTETRICS & GYNECOLOGY

## 2023-05-23 PROCEDURE — 3008F PR BODY MASS INDEX (BMI) DOCUMENTED: ICD-10-PCS | Mod: CPTII,S$GLB,, | Performed by: OBSTETRICS & GYNECOLOGY

## 2023-05-23 PROCEDURE — 3074F PR MOST RECENT SYSTOLIC BLOOD PRESSURE < 130 MM HG: ICD-10-PCS | Mod: CPTII,S$GLB,, | Performed by: OBSTETRICS & GYNECOLOGY

## 2023-05-23 PROCEDURE — 99999 PR PBB SHADOW E&M-EST. PATIENT-LVL III: ICD-10-PCS | Mod: PBBFAC,,, | Performed by: OBSTETRICS & GYNECOLOGY

## 2023-05-23 PROCEDURE — 3008F BODY MASS INDEX DOCD: CPT | Mod: CPTII,S$GLB,, | Performed by: OBSTETRICS & GYNECOLOGY

## 2023-05-23 PROCEDURE — 99213 PR OFFICE/OUTPT VISIT, EST, LEVL III, 20-29 MIN: ICD-10-PCS | Mod: S$GLB,,, | Performed by: OBSTETRICS & GYNECOLOGY

## 2023-05-23 PROCEDURE — 3078F PR MOST RECENT DIASTOLIC BLOOD PRESSURE < 80 MM HG: ICD-10-PCS | Mod: CPTII,S$GLB,, | Performed by: OBSTETRICS & GYNECOLOGY

## 2023-05-23 PROCEDURE — 3074F SYST BP LT 130 MM HG: CPT | Mod: CPTII,S$GLB,, | Performed by: OBSTETRICS & GYNECOLOGY

## 2023-05-23 PROCEDURE — 3078F DIAST BP <80 MM HG: CPT | Mod: CPTII,S$GLB,, | Performed by: OBSTETRICS & GYNECOLOGY

## 2023-05-23 PROCEDURE — 1159F MED LIST DOCD IN RCRD: CPT | Mod: CPTII,S$GLB,, | Performed by: OBSTETRICS & GYNECOLOGY

## 2023-05-23 RX ORDER — MIRABEGRON 25 MG/1
25 TABLET, FILM COATED, EXTENDED RELEASE ORAL DAILY
Qty: 30 TABLET | Refills: 11 | Status: SHIPPED | OUTPATIENT
Start: 2023-05-23 | End: 2024-05-22

## 2023-05-23 RX ORDER — NEOMYCIN SULFATE, POLYMYXIN B SULFATE, AND DEXAMETHASONE 3.5; 10000; 1 MG/G; [USP'U]/G; MG/G
OINTMENT OPHTHALMIC
COMMUNITY
Start: 2023-03-22

## 2023-05-23 NOTE — PROGRESS NOTES
Chief Complaint   Patient presents with    Bladder Prolapse     Follow up at doing pelvic physical therapy       History of Present Illness: Mely Viera is a 62 y.o. female that presents today 5/23/2023 with LMP Patient's last menstrual period was 08/11/2014. for   Chief Complaint   Patient presents with    Bladder Prolapse     Follow up at doing pelvic physical therapy         Past Medical History:   Diagnosis Date    Anxiety     Chronic insomnia     >40 years    Depression     Heat stroke     HSV (herpes simplex virus) infection        Past Surgical History:   Procedure Laterality Date    COLONOSCOPY N/A 12/6/2022    Procedure: COLONOSCOPY;  Surgeon: Damien Johnson MD;  Location: [facility] ENDO;  Service: Endoscopy;  Laterality: N/A;    ESOPHAGOGASTRODUODENOSCOPY N/A 12/6/2022    Procedure: EGD (ESOPHAGOGASTRODUODENOSCOPY);  Surgeon: Damien Johnson MD;  Location: [facility] ENDO;  Service: Endoscopy;  Laterality: N/A;    NONE      TONSILLECTOMY  1984       Current Outpatient Medications   Medication Sig Dispense Refill    ALPRAZolam (XANAX) 0.25 MG tablet TAKE 1 TABLET(0.25 MG) BY MOUTH EVERY NIGHT AS NEEDED FOR INSOMNIA OR ANXIETY 30 tablet 2    traZODone (DESYREL) 100 MG tablet Take 1 tablet (100 mg total) by mouth every evening. 30 tablet 11    valACYclovir (VALTREX) 500 MG tablet Take 1 tablet (500 mg total) by mouth once daily. 90 tablet 3    zaleplon (SONATA) 10 MG capsule Take 1 capsule (10 mg total) by mouth every evening. 30 capsule 0    mirabegron (MYRBETRIQ) 25 mg Tb24 ER tablet Take 1 tablet (25 mg total) by mouth once daily. 30 tablet 11    neomycin-polymyxin-dexamethasone (DEXACINE) 3.5 mg/g-10,000 unit/g-0.1 % Oint APPLY SMALL AMOUNT TO THE EYELID TWICE DAILY       No current facility-administered medications for this visit.       Review of patient's allergies indicates:   Allergen Reactions    Shrimp Swelling     hives    Temazepam Swelling    Egg      Other reaction(s): Hives    Escitalopram   "    Other reaction(s): did not like effects       Family History   Problem Relation Age of Onset    Bladder Cancer Father     Diabetes Sister     Hypertension Sister     Crohn's disease Sister     Breast cancer Maternal Aunt 49    Ovarian cancer Paternal Cousin 65    COPD Neg Hx     Stomach cancer Neg Hx     Esophageal cancer Neg Hx     Ulcerative colitis Neg Hx     Celiac disease Neg Hx        Social History     Tobacco Use    Smoking status: Never    Smokeless tobacco: Never   Substance Use Topics    Alcohol use: Not Currently     Alcohol/week: 0.0 - 2.0 standard drinks     Comment: less than once a week    Drug use: No       OB History    Para Term  AB Living   1 1 1     1   SAB IAB Ectopic Multiple Live Births           1      # Outcome Date GA Lbr Ruddy/2nd Weight Sex Delivery Anes PTL Lv   1 Term 10/09/13   3.289 kg (7 lb 4 oz) F Vag-Spont EPI N DARON         /68   Ht 5' 7" (1.702 m)   Wt 52.1 kg (114 lb 13.8 oz)   LMP 2014   Physical Exam:  APPEARANCE: Well nourished, well developed, in no acute distress.  SKIN: Normal skin turgor, no lesions.ABDOMEN: Soft. No tenderness or masses. No hepatosplenomegaly. No hernias.  PELVIC: Normal external female genitalia without lesions. Normal hair distribution. Adequate perineal body, normal urethral meatus. Urethra with no masses.  Bladder nontender. Vagina moist and well rugated without lesions or discharge. Cervix pink and without lesions. Grade 1-2 cystocele with mild improvement and grade 1 rectocele. Bimanual exam showed uterus normal size, shape, [id number], mobile and nontender. Adnexa without masses or tenderness. Urethra and bladder normal.  EXTREMITIES: No clubbing cyanosis or edema.    ASSESSMENT/PLAN:  Cystocele with rectocele  Comments:  she is not ready for surgery and would like to continue PT    Urinary urgency  -     mirabegron (MYRBETRIQ) 25 mg Tb24 ER tablet; Take 1 tablet (25 mg total) by mouth once daily.  Dispense: 30 " tablet; Refill: 11    Urge incontinence  -     mirabegron (MYRBETRIQ) 25 mg Tb24 ER tablet; Take 1 tablet (25 mg total) by mouth once daily.  Dispense: 30 tablet; Refill: 11      20 minutes spent today preparing reviewing previous external notes, reviewing previous results, performing medical examination, orders tests or medications, counseling and documenting.

## 2023-06-19 ENCOUNTER — PATIENT MESSAGE (OUTPATIENT)
Dept: FAMILY MEDICINE | Facility: CLINIC | Age: 63
End: 2023-06-19
Payer: COMMERCIAL

## 2023-06-19 ENCOUNTER — PATIENT MESSAGE (OUTPATIENT)
Dept: OBSTETRICS AND GYNECOLOGY | Facility: CLINIC | Age: 63
End: 2023-06-19
Payer: COMMERCIAL

## 2023-06-19 DIAGNOSIS — G47.09 OTHER INSOMNIA: Primary | ICD-10-CM

## 2023-06-20 RX ORDER — OXYBUTYNIN CHLORIDE 10 MG/1
10 TABLET, EXTENDED RELEASE ORAL DAILY
Qty: 30 TABLET | Refills: 2 | Status: SHIPPED | OUTPATIENT
Start: 2023-06-20 | End: 2023-09-18

## 2023-08-11 ENCOUNTER — OFFICE VISIT (OUTPATIENT)
Dept: FAMILY MEDICINE | Facility: CLINIC | Age: 63
End: 2023-08-11
Payer: COMMERCIAL

## 2023-08-11 VITALS
BODY MASS INDEX: 18.01 KG/M2 | HEIGHT: 67 IN | DIASTOLIC BLOOD PRESSURE: 58 MMHG | HEART RATE: 83 BPM | SYSTOLIC BLOOD PRESSURE: 100 MMHG | OXYGEN SATURATION: 99 % | WEIGHT: 114.75 LBS

## 2023-08-11 DIAGNOSIS — G47.09 OTHER INSOMNIA: ICD-10-CM

## 2023-08-11 DIAGNOSIS — Z78.0 POST-MENOPAUSAL: ICD-10-CM

## 2023-08-11 DIAGNOSIS — Z00.01 ANNUAL VISIT FOR GENERAL ADULT MEDICAL EXAMINATION WITH ABNORMAL FINDINGS: Primary | ICD-10-CM

## 2023-08-11 PROCEDURE — 3078F PR MOST RECENT DIASTOLIC BLOOD PRESSURE < 80 MM HG: ICD-10-PCS | Mod: CPTII,S$GLB,, | Performed by: FAMILY MEDICINE

## 2023-08-11 PROCEDURE — 99396 PR PREVENTIVE VISIT,EST,40-64: ICD-10-PCS | Mod: S$GLB,,, | Performed by: FAMILY MEDICINE

## 2023-08-11 PROCEDURE — 3008F PR BODY MASS INDEX (BMI) DOCUMENTED: ICD-10-PCS | Mod: CPTII,S$GLB,, | Performed by: FAMILY MEDICINE

## 2023-08-11 PROCEDURE — 3074F SYST BP LT 130 MM HG: CPT | Mod: CPTII,S$GLB,, | Performed by: FAMILY MEDICINE

## 2023-08-11 PROCEDURE — 99999 PR PBB SHADOW E&M-EST. PATIENT-LVL IV: ICD-10-PCS | Mod: PBBFAC,,, | Performed by: FAMILY MEDICINE

## 2023-08-11 PROCEDURE — 3078F DIAST BP <80 MM HG: CPT | Mod: CPTII,S$GLB,, | Performed by: FAMILY MEDICINE

## 2023-08-11 PROCEDURE — 3074F PR MOST RECENT SYSTOLIC BLOOD PRESSURE < 130 MM HG: ICD-10-PCS | Mod: CPTII,S$GLB,, | Performed by: FAMILY MEDICINE

## 2023-08-11 PROCEDURE — 99396 PREV VISIT EST AGE 40-64: CPT | Mod: S$GLB,,, | Performed by: FAMILY MEDICINE

## 2023-08-11 PROCEDURE — 99999 PR PBB SHADOW E&M-EST. PATIENT-LVL IV: CPT | Mod: PBBFAC,,, | Performed by: FAMILY MEDICINE

## 2023-08-11 PROCEDURE — 1159F MED LIST DOCD IN RCRD: CPT | Mod: CPTII,S$GLB,, | Performed by: FAMILY MEDICINE

## 2023-08-11 PROCEDURE — 1159F PR MEDICATION LIST DOCUMENTED IN MEDICAL RECORD: ICD-10-PCS | Mod: CPTII,S$GLB,, | Performed by: FAMILY MEDICINE

## 2023-08-11 PROCEDURE — 1160F RVW MEDS BY RX/DR IN RCRD: CPT | Mod: CPTII,S$GLB,, | Performed by: FAMILY MEDICINE

## 2023-08-11 PROCEDURE — 3008F BODY MASS INDEX DOCD: CPT | Mod: CPTII,S$GLB,, | Performed by: FAMILY MEDICINE

## 2023-08-11 PROCEDURE — 1160F PR REVIEW ALL MEDS BY PRESCRIBER/CLIN PHARMACIST DOCUMENTED: ICD-10-PCS | Mod: CPTII,S$GLB,, | Performed by: FAMILY MEDICINE

## 2023-08-11 RX ORDER — CLONAZEPAM 0.5 MG/1
0.5 TABLET ORAL NIGHTLY PRN
COMMUNITY

## 2023-08-11 NOTE — PROGRESS NOTES
Assessment:       1. Annual visit for general adult medical examination with abnormal findings    2. Post-menopausal    3. Other insomnia        Plan:       Annual visit for general adult medical examination with abnormal findings  -     CBC Auto Differential; Future; Expected date: 08/11/2023  -     Comprehensive Metabolic Panel; Future; Expected date: 08/11/2023  -     Lipid Panel; Future; Expected date: 08/11/2023  -     TSH; Future; Expected date: 08/11/2023    Post-menopausal  -     DXA Bone Density Axial Skeleton 1 or more sites; Future; Expected date: 08/11/2023    Other insomnia:  Stable       The patient will follow-up with the sleep specialist for insomnia, she will continue clonazepam, has an appointment for a follow-up in 4 weeks.  Will send a message when we have the results of the bone density, blood work and mammogram.   Patient agreed with assessment and plan. Patient verbalized understanding.     Subjective:       Patient ID: Mely Viera is a 62 y.o. female.    Chief Complaint: Follow-up and Annual Exam    HPI    The patient is coming here today for an annual checkup and for a follow-up visit.  The patient is currently taking clonazepam for insomnia was prescribed by the sleep specialist, currently taking 0.5 mg half a tablet.  The patient stated that is working well for her but make her feel drowsy in the morning.  She was taking Xanax in the past and she is currently not taking the medication anymore.  The patient denies any other symptoms of chest pain, shortness of breath, nausea, vomiting, abdominal pain, diarrhea and constipation.  The patient is due for mammogram and bone density test, she would like this to be ordered.      Past medical history, past social history was reviewed and discussed with the patient.    Review of Systems   Constitutional:  Negative for activity change, appetite change and chills.   HENT:  Negative for congestion and ear discharge.    Eyes:  Negative for  discharge and itching.   Respiratory:  Negative for choking and chest tightness.    Cardiovascular:  Negative for chest pain, palpitations and leg swelling.   Gastrointestinal:  Negative for abdominal distention, abdominal pain and constipation.   Endocrine: Negative for cold intolerance and heat intolerance.   Genitourinary:  Negative for dysuria and flank pain.   Musculoskeletal:  Negative for arthralgias and back pain.   Skin:  Negative for pallor and rash.   Allergic/Immunologic: Negative for environmental allergies and food allergies.   Neurological:  Negative for dizziness, facial asymmetry and headaches.   Hematological:  Negative for adenopathy. Does not bruise/bleed easily.   Psychiatric/Behavioral:  Positive for sleep disturbance. Negative for agitation, confusion and decreased concentration. The patient is nervous/anxious.        Objective:      Physical Exam  Vitals and nursing note reviewed.   Constitutional:       General: She is not in acute distress.     Appearance: Normal appearance. She is well-developed. She is not diaphoretic.   HENT:      Head: Normocephalic and atraumatic.      Right Ear: External ear normal.      Left Ear: External ear normal.      Nose: Nose normal.      Mouth/Throat:      Pharynx: No oropharyngeal exudate.   Eyes:      General: No scleral icterus.        Right eye: No discharge.         Left eye: No discharge.      Conjunctiva/sclera: Conjunctivae normal.      Pupils: Pupils are equal, round, and reactive to light.   Cardiovascular:      Rate and Rhythm: Normal rate and regular rhythm.      Heart sounds: Normal heart sounds.   Pulmonary:      Effort: Pulmonary effort is normal. No respiratory distress.      Breath sounds: Normal breath sounds. No wheezing.   Abdominal:      General: Abdomen is flat. Bowel sounds are normal. There is no distension.      Palpations: There is no mass.      Tenderness: There is no abdominal tenderness.   Musculoskeletal:         General: No  tenderness or deformity.      Cervical back: Neck supple.   Skin:     Coloration: Skin is not pale.      Findings: No erythema.   Neurological:      Mental Status: She is alert.      Cranial Nerves: No cranial nerve deficit.      Coordination: Coordination normal.   Psychiatric:         Behavior: Behavior normal.         Thought Content: Thought content normal.         Judgment: Judgment normal.

## 2023-08-14 ENCOUNTER — HOSPITAL ENCOUNTER (OUTPATIENT)
Dept: RADIOLOGY | Facility: HOSPITAL | Age: 63
Discharge: HOME | End: 2023-08-14
Attending: FAMILY MEDICINE
Payer: COMMERCIAL

## 2023-08-14 DIAGNOSIS — Z12.31 BREAST CANCER SCREENING BY MAMMOGRAM: ICD-10-CM

## 2023-08-14 DIAGNOSIS — Z12.31 ENCOUNTER FOR SCREENING MAMMOGRAM FOR MALIGNANT NEOPLASM OF BREAST: ICD-10-CM

## 2023-08-14 PROCEDURE — 77067 SCR MAMMO BI INCL CAD: CPT | Mod: TC,PO

## 2023-08-14 PROCEDURE — 77067 MAMMO DIGITAL SCREENING BILAT WITH TOMO: ICD-10-PCS | Mod: 26,,, | Performed by: RADIOLOGY

## 2023-08-14 PROCEDURE — 77067 SCR MAMMO BI INCL CAD: CPT | Mod: 26,,, | Performed by: RADIOLOGY

## 2023-08-14 PROCEDURE — 77063 MAMMO DIGITAL SCREENING BILAT WITH TOMO: ICD-10-PCS | Mod: 26,,, | Performed by: RADIOLOGY

## 2023-08-14 PROCEDURE — 77063 BREAST TOMOSYNTHESIS BI: CPT | Mod: 26,,, | Performed by: RADIOLOGY

## 2023-08-15 ENCOUNTER — TELEPHONE (OUTPATIENT)
Dept: RADIOLOGY | Facility: HOSPITAL | Age: 63
End: 2023-08-15
Payer: COMMERCIAL

## 2023-08-15 ENCOUNTER — CLINICAL SUPPORT (OUTPATIENT)
Dept: REHABILITATION | Facility: HOSPITAL | Age: 63
End: 2023-08-15
Payer: COMMERCIAL

## 2023-08-15 DIAGNOSIS — N81.89 PELVIC FLOOR WEAKNESS IN FEMALE: ICD-10-CM

## 2023-08-15 DIAGNOSIS — N39.46 MIXED STRESS AND URGE URINARY INCONTINENCE: Primary | ICD-10-CM

## 2023-08-15 DIAGNOSIS — N81.6 CYSTOCELE WITH RECTOCELE: ICD-10-CM

## 2023-08-15 DIAGNOSIS — N81.10 CYSTOCELE WITH RECTOCELE: ICD-10-CM

## 2023-08-15 DIAGNOSIS — R27.8 OTHER LACK OF COORDINATION: ICD-10-CM

## 2023-08-15 PROCEDURE — 97161 PT EVAL LOW COMPLEX 20 MIN: CPT | Mod: PN

## 2023-08-16 ENCOUNTER — HOSPITAL ENCOUNTER (OUTPATIENT)
Dept: RADIOLOGY | Facility: HOSPITAL | Age: 63
Discharge: HOME | End: 2023-08-16
Attending: FAMILY MEDICINE
Payer: COMMERCIAL

## 2023-08-16 DIAGNOSIS — Z78.0 POST-MENOPAUSAL: ICD-10-CM

## 2023-08-16 PROBLEM — N81.89 PELVIC FLOOR WEAKNESS IN FEMALE: Status: ACTIVE | Noted: 2023-08-16

## 2023-08-16 PROBLEM — R27.8 OTHER LACK OF COORDINATION: Status: ACTIVE | Noted: 2023-08-16

## 2023-08-16 PROCEDURE — 77080 DXA BONE DENSITY AXIAL SKELETON 1 OR MORE SITES: ICD-10-PCS | Mod: 26,,, | Performed by: RADIOLOGY

## 2023-08-16 PROCEDURE — 77080 DXA BONE DENSITY AXIAL: CPT | Mod: 26,,, | Performed by: RADIOLOGY

## 2023-08-16 PROCEDURE — 77080 DXA BONE DENSITY AXIAL: CPT | Mod: TC,PO

## 2023-08-16 NOTE — PLAN OF CARE
[facility]  Pelvic Health Physical Therapy Initial Evaluation    Date: 8/15/2023   Name: Mely Viera  Clinic Number: [id number]  Therapy Diagnosis:   Encounter Diagnoses   Name Primary?    Mixed stress and urge urinary incontinence Yes    Cystocele with rectocele     Pelvic floor weakness in female     Other lack of coordination      Physician: Margarette Nazario, *    Physician Orders: PT Eval and Treat     Medical Diagnosis from Referral:   N81.10,N81.6 (ICD-10-CM) - Cystocele with rectocele  Evaluation Date: 8/15/2023  Authorization Period Expiration: 23  Plan of Care Expiration: 23  Progress Note Due: 9/15/23  Visit # / Visits authorized:  10/20 from old auth    FOTO: Issued Visit # 1    Time In: 2:30  Time Out: 3:25  Total Appointment Time (timed & untimed codes): 55 minutes    Precautions: universal    Subjective     Date of onset: 1.5 years ago    History of current condition - Mely reports: Hx of POP with UUI and at times WOO. Has been seen at [facility] since 2022. She is transferring services to this clinic while her treating PT at [facility] is on maternity leave.      Feels she has made progress. Has K-Fit Kegel  and uses 3x week.    OB/GYN History: vaginal delivery, episiotomy, ant & post prolapse, and menopause,   Sexually active? Yes  Appropriate lubrication/hydration? Occasional dryness  Appropriate libido/arousal? Yes  Able to reach orgasm? Yes  Pain? Not usually; sometimes with initial penetraion    Bladder/Bowel History:   Frequency of urination:   Daytime: Every 1 - 2 hours           Nighttime: 1x  Difficulty initiating urine stream: No  Urine stream: strong  Complete emptying: Yes - sometimes gets urge 15 min after voiding  Bladder leakage: Yes  Frequency of incidents: Hasn't been leaking but is voiding frequently  Amount leaked (urine): few drops, teaspoon(s), and large squirt sometimes when getting to toilet  Urinary Urgency: Yes  Able to delay  the urge for at least 5 minute(s).  Frequency of bowel movements: once a day  Difficulty initiating BM: No  Quality/Shape of BM: [place]nn Stool Chart 4  Does Patient Feel Empty after BM? Yes  Fiber Supplements or Laxative Use?  Yes - fiber drinks and probiotic yogurt  Colon leakage: No  Form of protection: none      PAIN:  Not an issue     Medical History: Mely  has a past medical history of Anxiety, Chronic insomnia, Depression, Heat stroke, and HSV (herpes simplex virus) infection.     Surgical History:  Mely Viera  has a past surgical history that includes NONE; Tonsillectomy (1984); Esophagogastroduodenoscopy (N/A, 12/6/2022); and Colonoscopy (N/A, 12/6/2022).    Medications: Mely has a current medication list which includes the following prescription(s): alprazolam, clonazepam, myrbetriq, neomycin-polymyxin-dexamethasone, oxybutynin, and valacyclovir.    Allergies:   Review of patient's allergies indicates:   Allergen Reactions    Shrimp Swelling     hives    Temazepam Swelling    Egg      Other reaction(s): Hives    Escitalopram      Other reaction(s): did not like effects        Imaging CT scan films of pelvis 9/21/22:   Impression:     1. Few diverticula in the sigmoid colon without definite signs for acute diverticulitis.  2. Noncontrast CT scan of the abdomen and pelvis otherwise is unremarkable.    Prior Therapy/Previous treatment included: Has had PFPT since Nov 2022 at [facility] with + results  Social History:  lives alone  Current exercise:2x/week Silver Sneakers/ 3x/week pelvic floor HEP  Occupation: Retired  Prior Level of Function: I  Current Level of Function: see above    Types of fluid intake: Water  Diet: regular   Habitus:thin  Abuse/Neglect: tbd    Pts goals: Strengthen pelvic floor and avoid surgery    OBJECTIVE     See EMR under MEDIA for written consent provided 8/15/2023  Chaperone: declined    ORTHO SCREEN  deferred    BREATHING MECHANICS ASSESSMENT   Thorax Assessment During Quiet  Respiration: WNL excursion of ribcage and WNL excursion of abdominal wall  Thorax Assessment During Deep Respiration: WNL excursion of abdominal wall and Decreased excursion bilaterally of lateral ribs     VAGINAL PELVIC FLOOR EXAM     EXTERNAL ASSESSMENT  Introitus: WNL  Skin condition: WNL  Scarring: none  Sensation: WNL  Pain: none  Voluntary contraction: visible lift  Involuntary contraction:  Bearing down: Stage 2 cystocele                               INTERNAL ASSESSMENT  Sensation: able to localize pressure appropriately  Pain: none  Vaginal vault: roomy  Muscle Bulk: atrophy/however normal muscle tone noted in some areas  Muscle Power: weak 3+/5   Muscle Endurance: 10 sec  Involuntary Contraction: valsalva / worsening prolapse  Bearing Down: excursion worsening prolapse  Quality of contraction: slow rise and slow relaxation  Specificity: WNL  Prolapse Screen: grade 2 cystocele and grade 1 rectocele       Hip Range of Motion    Right Left     Passive Passive   Flexion WFL WFL   Extension WFL WFL   Abduction WFL WFL   Adduction WFL WFL   External rotation WFL WFL   Internal rotation WFL WFL   *painful     Lower Extremity Strength    Right Left   Hip flexion 4+/5 4+/5   Hip extension 4+/5 4+/5   Hip abduction 4/5 4/5   Hip adduction 4/5 4/5   Hip ER 4/5 4/5   Hip IR 4/5 4/5     Limitation/Restriction for FOTO Prolapse Survey    Therapist reviewed FOTO scores for Mely Viera on 8/15/2023.   FOTO documents entered into EPIC - see Media section.    Limitation Score: 8%       TREATMENT     No treatment today. Reviewed patient's existing HEP and protocol for K-Fit . Pt to continue as prescribed by previous PT.    Patient Education provided:   benefits of treatment was discussed with the pt. Additionally, plan for progression of strengthening exercises was reviewed.     Home Exercises Provided:  Written Home Exercises Provided: Patient instructed to cont prior HEP.  Exercises were reviewed and Mely perera  able to demonstrate them prior to the end of the session.    Mely demonstrated good  understanding of the education provided.       Assessment     Mely is a 62 y.o. female referred to outpatient Physical Therapy with a medical diagnosis of cystocele with rectocele. Pt presents with decreased pelvic muscle strength, decreased endurance of the pelvic muscles, decreased phasic ability of the pelvic muscles, increased frequency of urination, and poor coordination of pelvic floor muscles during ADL's leading to urinary or fecal leakage.     Pt prognosis is Good.   Pt will benefit from skilled outpatient Physical Therapy to address the deficits stated above and in the chart below, provide pt/family education, and to maximize pt's level of independence.     Plan of care discussed with patient: Yes  Pt's spiritual, cultural and educational needs considered and patient is agreeable to the plan of care and goals as stated below:       Anticipated Barriers for therapy: none    Medical Necessity is demonstrated by the following:  History  Co-morbidities and personal factors that may impact the plan of care Co-morbidities   insomnia, scoliosis, HSV, depression, anxiety, POP , GUSM     Personal Factors  none       high   Examination  Body structures and functions, activity limitations and participation restrictions that may impact the plan of care Body Regions/Systems/Functions:  altered posture, poor knowledge of body mechanics and posture, poor trunk stability, perineal descent, decreased pelvic muscle strength, decreased endurance of the pelvic muscles, decreased phasic ability of the pelvic muscles, increased tension of the pelvic muscles, poor quality of pelvic muscle contraction, increased frequency of urination, increased nocturia, poor coordination of pelvic floor muscles during ADL's leading to urinary or fecal leakage, and dysfunctional defecation      Activity Limitations:  delaying urge to urinate, bearing down  for BM, incontinence with ADLs, and bathroom mapping     Participation Restrictions:  all ADLs/iADLs uninterrupted by urinary incontinence/urgency/frequency, all ADLs/iADLs uninterrupted by discomfort associated with chronic constipation, social activities with friends/family, regularly having a comfortable BM, work duties, and exercise restrictions due to incontinence              high   Clinical Presentation evolving clinical presentation with changing clinical characteristics moderate   Decision Making/ Complexity Score: moderate          Goals:  ST weeks  1)Pt will be independent with HEP for self management of symptoms.-MET  2)Pt will maintain a voiding interval of > or = 1.5 hours for improved activity tolerance (ie. prolonged driving, work meeting, watching a movie).-progressing (utilizing urge suppression techniques to assist)     LT weeks  1) Pt will improve PFM strength to a 4/5 to allow for improved strength and continued maintenance of prolapse symptoms. -progressing (  2) Pt to demonstrate improved B hip strength to 4+/5. -progressing  3) Pt to report decreased vaginal pressure and urgency during exercise class.-progressing        Plan     Plan of care Certification: 8/15/2023 to 2013.    Outpatient Physical Therapy 1 times weekly for 4 weeks then every other week for 4 visits to include the following interventions: therapeutic exercises, therapeutic activity, neuromuscular re-education, manual therapy, patient/family education, dry needling, and self care/home management    Nati Nance, PT

## 2023-08-18 ENCOUNTER — HOSPITAL ENCOUNTER (OUTPATIENT)
Dept: RADIOLOGY | Facility: HOSPITAL | Age: 63
Discharge: HOME | End: 2023-08-18
Attending: FAMILY MEDICINE
Payer: COMMERCIAL

## 2023-08-18 DIAGNOSIS — R92.8 ABNORMAL MAMMOGRAM OF RIGHT BREAST: ICD-10-CM

## 2023-08-18 PROCEDURE — 77065 MAMMO DIGITAL DIAGNOSTIC RIGHT WITH TOMO: ICD-10-PCS | Mod: 26,RT,, | Performed by: RADIOLOGY

## 2023-08-18 PROCEDURE — 77061 MAMMO DIGITAL DIAGNOSTIC RIGHT WITH TOMO: ICD-10-PCS | Mod: 26,RT,, | Performed by: RADIOLOGY

## 2023-08-18 PROCEDURE — 76642 ULTRASOUND BREAST LIMITED: CPT | Mod: 26,RT,, | Performed by: RADIOLOGY

## 2023-08-18 PROCEDURE — 76642 US BREAST RIGHT LIMITED: ICD-10-PCS | Mod: 26,RT,, | Performed by: RADIOLOGY

## 2023-08-18 PROCEDURE — 76642 ULTRASOUND BREAST LIMITED: CPT | Mod: TC,PO,RT

## 2023-08-18 PROCEDURE — 77065 DX MAMMO INCL CAD UNI: CPT | Mod: 26,RT,, | Performed by: RADIOLOGY

## 2023-08-18 PROCEDURE — 77061 BREAST TOMOSYNTHESIS UNI: CPT | Mod: 26,RT,, | Performed by: RADIOLOGY

## 2023-08-18 PROCEDURE — 77061 BREAST TOMOSYNTHESIS UNI: CPT | Mod: TC,PO,RT

## 2023-08-30 ENCOUNTER — CLINICAL SUPPORT (OUTPATIENT)
Dept: REHABILITATION | Facility: HOSPITAL | Age: 63
End: 2023-08-30
Payer: COMMERCIAL

## 2023-08-30 DIAGNOSIS — N81.89 PELVIC FLOOR WEAKNESS IN FEMALE: Primary | ICD-10-CM

## 2023-08-30 DIAGNOSIS — R27.8 OTHER LACK OF COORDINATION: ICD-10-CM

## 2023-08-30 PROCEDURE — 97112 NEUROMUSCULAR REEDUCATION: CPT | Mod: PN

## 2023-08-30 PROCEDURE — 97110 THERAPEUTIC EXERCISES: CPT | Mod: PN

## 2023-08-30 NOTE — PROGRESS NOTES
"  Pelvic Health Physical Therapy   Treatment Note     Name: Mely Viera  Clinic Number: [id number]    Therapy Diagnosis:   Encounter Diagnoses   Name Primary?    Pelvic floor weakness in female Yes    Other lack of coordination      Physician: Margarette Nazario, *    Visit Date: 8/30/2023    Physician Orders: PT Eval and Treat      Medical Diagnosis from Referral:   N81.10,N81.6 (ICD-10-CM) - Cystocele with rectocele  Evaluation Date: 8/15/2023  Authorization Period Expiration: 12/31/23  Plan of Care Expiration: 11/7/23  Progress Note Due: 9/15/23  Visit # / Visits authorized:  10/20 from old auth     FOTO: Issued Visit # 1    Time In: 9:00  Time Out: 9:50  Total Billable Time: 50 minutes    Precautions: Standard    Subjective     Pt reports:  Using K-Fit  consistently - has been at same program since starting to use it.  One episode of leakage after watering lawn and then stopped in kitchen to do something and didn't make it to toilet without leak.   Doing her HEP 3x/wk while at gym.    She was compliant with home exercise program.  Response to previous treatment: Initial eval  Functional change: continues to report improvement in urinary urgency and incontinence    Pain: none reported    Constitutional Symptoms Review: The patient denies having any constitutional symptoms.     Objective   Pt verbally consents to intravaginal treatment today.  Signed consent form already on file.       Mely received therapeutic exercises to develop  strength, endurance, and core stabilization for 40 minutes including:   hooklying hip adduction ball squeezes 1x10 (neutral, IR and ER)  + bridges with ball squeeze 2x10   - marches Level 2 2x10 B   +Isometric abdominals  1x10  +squats with 10# KB 2x10  +Deadlifts with 2  10# DB 2x10  +Retro lunge with knee lift 1x10 B  +Speed skaters 30"    Not performed:  +-quadruped hip extension 1x8 RTB  -quadruped clam 1x8 RTB    Next:  +Lateral band walks   Side " plank  +Plank    Mely participated in neuromuscular re-education activities to develop Coordination, Control, and Proprioception for 10 minutes including:   Kegel holds with intravaginal digital feedback - isolated and with TA co-contraction.  Intravaginal digital monitoring by PT while pt performed abdominal marches and bridges. Pt cued to coordinate breathing with lower abdominal engagement.   -Cues throughout ex session for breath coordination with TA engagement.      Home Exercises Provided and Patient Education Provided     Education provided:   - posture/body mechanices, isometric abdominal exercises, and coordination of breathing with TA engagement during all exercises.  Discussed progression of plan of care with patient; educated pt in activity modification; reviewed HEP with pt. Pt demonstrated and verbalized understanding of all instruction and was provided with a handout of HEP (see Patient Instructions).    Written Home Exercises Provided: yes.  Exercises were reviewed and Mely was able to demonstrate them prior to the end of the session.  Mely demonstrated good  understanding of the education provided.     See EMR under Patient Instructions for exercises provided 8/30/2023.    Assessment     Mely reports one episode of UUI last week. Using her Kegel Trainer - has been on same program since starting use. Suggested pt try other programs - #6 and #10. Monitored patient intravaginally during bridges and marches - pt with adequate engagement of PFM and TA with these ex. Progressed to standing functional ex for strengthening of abdominals, glutes, hips and PFM.  Mely Is progressing well towards her goals.   Pt prognosis is Good.     Pt will continue to benefit from skilled outpatient physical therapy to address the deficits listed in the problem list box on initial evaluation, provide pt/family education and to maximize pt's level of independence in the home and community environment.     Pt's  spiritual, cultural and educational needs considered and pt agreeable to plan of care and goals.     Anticipated barriers to physical therapy: none    Goals:   ST weeks  1)Pt will be independent with HEP for self management of symptoms.-MET  2)Pt will maintain a voiding interval of > or = 1.5 hours for improved activity tolerance (ie. prolonged driving, work meeting, watching a movie).-progressing (utilizing urge suppression techniques to assist)     LT weeks  1) Pt will improve PFM strength to a 4/5 to allow for improved strength and continued maintenance of prolapse symptoms. -progressing (  2) Pt to demonstrate improved B hip strength to 4+/5. -progressing  3) Pt to report decreased vaginal pressure and urgency during exercise class.-progressing     Plan     Plan of care Certification: 8/15/2023 to 2013.     Outpatient Physical Therapy 1 times weekly for 4 weeks then every other week for 4 visits to include the following interventions: therapeutic exercises, therapeutic activity, neuromuscular re-education, manual therapy, patient/family education, dry needling, and self care/home management    Nati Nance, PT

## 2023-09-18 RX ORDER — OXYBUTYNIN CHLORIDE 10 MG/1
10 TABLET, EXTENDED RELEASE ORAL DAILY
Qty: 90 TABLET | Refills: 2 | Status: SHIPPED | OUTPATIENT
Start: 2023-09-18

## 2023-09-18 NOTE — TELEPHONE ENCOUNTER
Refill Decision Note   Mely Viera  is requesting a refill authorization.  Brief Assessment and Rationale for Refill:  Approve     Medication Therapy Plan:       Medication Reconciliation Completed: No    Comments:     No Care Gaps recommended.     Note composed:1:59 PM 09/18/2023

## 2023-09-28 ENCOUNTER — CLINICAL SUPPORT (OUTPATIENT)
Dept: REHABILITATION | Facility: HOSPITAL | Age: 63
End: 2023-09-28
Payer: COMMERCIAL

## 2023-09-28 DIAGNOSIS — N81.89 PELVIC FLOOR WEAKNESS IN FEMALE: Primary | ICD-10-CM

## 2023-09-28 DIAGNOSIS — R27.8 OTHER LACK OF COORDINATION: ICD-10-CM

## 2023-09-28 PROCEDURE — 97112 NEUROMUSCULAR REEDUCATION: CPT | Mod: PN

## 2023-09-28 PROCEDURE — 97140 MANUAL THERAPY 1/> REGIONS: CPT | Mod: PN

## 2023-09-28 NOTE — PROGRESS NOTES
Pelvic Health Physical Therapy   Treatment/ Progress Note     Name: Mely Viera  Clinic Number: [id number]    Therapy Diagnosis:   Encounter Diagnoses   Name Primary?    Pelvic floor weakness in female Yes    Other lack of coordination        Physician: Margarette Nazario, *    Visit Date: 9/28/2023    Physician Orders: PT Eval and Treat      Medical Diagnosis from Referral:   N81.10,N81.6 (ICD-10-CM) - Cystocele with rectocele  Evaluation Date: 8/15/2023  Authorization Period Expiration: 12/31/23  Plan of Care Expiration: 11/7/23  Progress Note Due: 10/15/23  Visit # / Visits authorized:  12/20 from old auth     FOTO: Issued Visit # 1    Time In:4:30  Time Out: 5:16  Total Billable Time: 46 minutes    Precautions: Standard    Subjective     Pt reports:    Using K-Fit  -tried it on different program  No episodes of leakage since last visit.  Does not take oxybutynin consistently  - takes it prn when she is going out  Was doing yard work and had gone 2 hours since last void but then had to run to the toilet.     Doing her HEP 3x/wk while at gym.    Bladder/Bowel History:   Frequency of urination:   Daytime: Every 1 - 2 hours  (some days she goes more than 2 hours)                                   Nighttime: 1x  Difficulty initiating urine stream: No  Urine stream: strong  Complete emptying: Yes - double voiding  Bladder leakage: Not lately  Frequency of incidents: Hasn't been leaking but is voiding frequently  Amount leaked (urine): few drops, teaspoon(s), and large squirt sometimes when getting to toilet  Urinary Urgency: Yes  Able to delay the urge for at least 5 minute(s).  Frequency of bowel movements: once a day  Difficulty initiating BM: No  Quality/Shape of BM: [place] Stool Chart 4  Does Patient Feel Empty after BM? Yes  Fiber Supplements or Laxative Use?  Yes - fiber drinks and probiotic yogurt  Colon leakage: No  Form of protection: none      She was compliant with home exercise  program.  Response to previous treatment:Positive  Functional change: continues to report improvement in urinary urgency and incontinence; no urinary incontinence since last visit    Pain: none reported    Constitutional Symptoms Review: The patient denies having any constitutional symptoms.     Objective   Pt verbally consents to intravaginal treatment today.  Signed consent form already on file.     Lower Extremity Strength    Right Left   Hip flexion 4+/5 4+/5   Hip extension 4+/5 4+/5   Hip abduction 4+/5 4+/5   Hip adduction 4+/5 4+/5   Hip ER 4/5 4/5   Hip IR 4/5 4/5        INTERNAL ASSESSMENT  Sensation: able to localize pressure appropriately  Pain: none  Vaginal vault: roomy  Muscle Bulk: atrophy/however trigger points of muscle guarding in levator ani  Muscle Power: 4-/5  (improved from 3+/5)  Muscle Endurance: 10 sec  Involuntary Contraction: worsening prolapse  Bearing Down: excursion worsening prolapse  Quality of contraction: slow rise and slow relaxation  Specificity: WNL  Prolapse Screen: grade 2 cystocele and grade 1 rectocele      Manual therapy was performed for 25 min to improve muscle guarding:  -intravaginal manual release of B levator ani and obturator internus m  Pt with moderate tension noted in levator ani which responded + to direct ischemic pressure.    Mely participated in neuromuscular re-education activities to develop Coordination, Control, and Proprioception for 21 minutes including:   -NMRE with EMG biofeedback using external anal sensors. Pt with resting tone of 10 microvolts. Pt performed diaphragmatic breathing and received manual release of PFM but still registered a higher than average resting tone at 7-10 mv.   Pt is able to actively contract and lift pelvic floor muscles but has incomplete relaxation.   -Pt instructed in urge delay strategies to reduce urinary urgency. She was given written instructions and is to apply when strong urge occurs. Pt is to try to push void  "window gradually to every 2 hours.     Not performed:  Kegel holds with intravaginal digital feedback - isolated and with TA co-contraction.  Intravaginal digital monitoring by PT while pt performed abdominal marches and bridges. Pt cued to coordinate breathing with lower abdominal engagement.     Mely received therapeutic exercises to develop  strength, endurance, and core stabilization for 0 minutes including:   hooklying hip adduction ball squeezes 1x10 (neutral, IR and ER)  + bridges with ball squeeze 2x10   - marches Level 2 2x10 B   +Isometric abdominals  1x10  +squats with 10# KB 2x10  +Deadlifts with 2  10# DB 2x10  +Retro lunge with knee lift 1x10 B  +Speed skaters 30"    Not performed:  +-quadruped hip extension 1x8 RTB  -quadruped clam 1x8 RTB    Next:  +Lateral band walks   Side plank  +Plank    Home Exercises Provided and Patient Education Provided     Education provided:   - posture/body mechanices, isometric abdominal exercises, and coordination of breathing with TA engagement during all exercises.  -Urge delay strategies. Try to push void window to 2 hours.     Discussed progression of plan of care with patient; educated pt in activity modification; reviewed HEP with pt.     Pt demonstrated and verbalized understanding of all instruction and was provided with a handout of HEP (see Patient Instructions).    Written Home Exercises Provided: yes.  Exercises were reviewed and Mely was able to demonstrate them prior to the end of the session.  Mely demonstrated good  understanding of the education provided.     See EMR under Patient Instructions for exercises provided 8/30/2023, 9/28/23    Assessment     Mely reports no urinary leakage this week. Does have urgency and is voiding every 1-2 hours. Pt instructed in urge delay strategies and is to try to push void window to 2 hours. Pt tried other programs on Kegel  at home. She exhibits higher than average resting tone of PFM even after " manual release on EMG.  Pt has improved PFM strength to 4-/5.  Mely Is progressing well towards her goals.   Pt prognosis is Good.     Pt will continue to benefit from skilled outpatient physical therapy to address the deficits listed in the problem list box on initial evaluation, provide pt/family education and to maximize pt's level of independence in the home and community environment.     Pt's spiritual, cultural and educational needs considered and pt agreeable to plan of care and goals.     Anticipated barriers to physical therapy: none    Goals:   ST weeks  1)Pt will be independent with HEP for self management of symptoms.-MET  2)Pt will maintain a voiding interval of > or = 1.5 hours for improved activity tolerance (ie. prolonged driving, work meeting, watching a movie).-progressing (utilizing urge suppression techniques to assist)     LT weeks  1) Pt will improve PFM strength to a 4/5 to allow for improved strength and continued maintenance of prolapse symptoms. -progressing (  2) Pt to demonstrate improved B hip strength to 4+/5. -progressing  3) Pt to report decreased vaginal pressure and urgency during exercise class.-progressing     Plan     Plan of care Certification: 8/15/2023 to 2013.     Outpatient Physical Therapy 1 times weekly for 4 weeks then every other week for 4 visits to include the following interventions: therapeutic exercises, therapeutic activity, neuromuscular re-education, manual therapy, patient/family education, dry needling, and self care/home management    Nati Nance, PT

## 2023-09-28 NOTE — PATIENT INSTRUCTIONS
Home Exercise Program: 09/28/2023    CONTROLLING URINARY / FECAL URGENCY    What to do when you experience a strong urge to urinate or defecate:     FIRST  Stop activity, stand quietly or sit down. Try to stay very still to maintain control. Avoid rushing to the toilet.    SECOND Begin Quick Flicks (1 second LIFT of pelvic floor muscles, 4 second DROP). Pelvic floor contractions send a message to the bladder to relax and hold urine.     THIRD Relax. Do not rush to the toilet. Take a deep belly or diaphragmatic breath and let it out slowly. Let the urge to urinate pass by using distraction techniques and positive thoughts. Try not to think about going to the bathroom.     FINALLY If the urge returns, repeat the above steps to regain control. When you feel the urge subside, walk normally to the bathroom. You can urinate once the urge has subsided.        The urge feeling strikes!   Stop, breathe, and be still and then begin Quick Flicks     Do Not rush to the toilet.     Think positively and distract yourself.

## 2023-10-24 ENCOUNTER — OFFICE VISIT (OUTPATIENT)
Dept: CARDIOLOGY | Facility: CLINIC | Age: 63
End: 2023-10-24
Payer: COMMERCIAL

## 2023-10-24 VITALS
SYSTOLIC BLOOD PRESSURE: 116 MMHG | WEIGHT: 115.5 LBS | RESPIRATION RATE: 17 BRPM | HEIGHT: 67 IN | BODY MASS INDEX: 18.13 KG/M2 | DIASTOLIC BLOOD PRESSURE: 71 MMHG | HEART RATE: 65 BPM

## 2023-10-24 DIAGNOSIS — R00.2 PALPITATIONS: Primary | ICD-10-CM

## 2023-10-24 DIAGNOSIS — R55 SYNCOPE AND COLLAPSE: ICD-10-CM

## 2023-10-24 PROCEDURE — 99213 PR OFFICE/OUTPT VISIT, EST, LEVL III, 20-29 MIN: ICD-10-PCS | Mod: S$GLB,,,

## 2023-10-24 PROCEDURE — 3078F DIAST BP <80 MM HG: CPT | Mod: CPTII,S$GLB,,

## 2023-10-24 PROCEDURE — 99999 PR PBB SHADOW E&M-EST. PATIENT-LVL III: ICD-10-PCS | Mod: PBBFAC,,,

## 2023-10-24 PROCEDURE — 3078F PR MOST RECENT DIASTOLIC BLOOD PRESSURE < 80 MM HG: ICD-10-PCS | Mod: CPTII,S$GLB,,

## 2023-10-24 PROCEDURE — 3008F PR BODY MASS INDEX (BMI) DOCUMENTED: ICD-10-PCS | Mod: CPTII,S$GLB,,

## 2023-10-24 PROCEDURE — 3074F PR MOST RECENT SYSTOLIC BLOOD PRESSURE < 130 MM HG: ICD-10-PCS | Mod: CPTII,S$GLB,,

## 2023-10-24 PROCEDURE — 3074F SYST BP LT 130 MM HG: CPT | Mod: CPTII,S$GLB,,

## 2023-10-24 PROCEDURE — 99999 PR PBB SHADOW E&M-EST. PATIENT-LVL III: CPT | Mod: PBBFAC,,,

## 2023-10-24 PROCEDURE — 99213 OFFICE O/P EST LOW 20 MIN: CPT | Mod: S$GLB,,,

## 2023-10-24 PROCEDURE — 3008F BODY MASS INDEX DOCD: CPT | Mod: CPTII,S$GLB,,

## 2023-10-24 NOTE — PROGRESS NOTES
Subjective:    Patient ID:  Mely Viera is a 62 y.o. female patient here for evaluation Follow-up    History of Present Illness:     Mrs. Viera is a 62 year old F who previously followed with Dr. Young here today for an annual follow up. Has had a really good year so far. Still having occasional palpitations, but no more near syncope or syncope. BP has been well controlled. Exercising almost daily in some way or the other.           Most Recent Echocardiogram Results  Results for orders placed in visit on 09/09/22    Echo    Interpretation Summary  · The left ventricle is normal in size with normal systolic function.  · The estimated ejection fraction is 65%.  · Normal left ventricular diastolic function.  · Normal right ventricular size with normal right ventricular systolic function.  · The aortic valve appears structurally normal. There is normal leaflet mobility.  · The mitral valve appears structurally normal. There is normal leaflet mobility.  · Mild tricuspid regurgitation.  · Normal central venous pressure (3 mmHg).  · The estimated PA systolic pressure is 25 mmHg.      Most Recent Nuclear Stress Test Results  Results for orders placed in visit on 09/16/22    Nuclear Stress - Cardiology Interpreted    Interpretation Summary    Normal myocardial perfusion scan. There is no evidence of myocardial ischemia or infarction.    The gated perfusion images showed an ejection fraction of 78% at rest.    There is normal wall motion at rest.    The EKG portion of this study is negative for ischemia.    There were no arrhythmias during stress.    The exercise capacity was normal.  The patient exercised for 6 minutes 46 seconds, achieved 11 Mets and 90% of maximum predicted heart rate      Most Recent Cardiac PET Stress Test Results  No results found for this or any previous visit.      Most Recent Cardiovascular Angiogram results  No results found for this or any previous visit.      Other Most Recent Cardiology  Results  Results for orders placed in visit on 10/11/22    Holter monitor - 48 hour    Interpretation Summary  · Predominant Rhythm Sinus rhythm with heart rates varying between 43 and 122 BPM with an average of 72BPM.  · Ventricular Arrhythmias There were very rare PVCs totalling 4 and averaging 0.08 per hour.  · Supraventricular Arrhythmias There were very rare PACs totalling 10 and averaging 0.21 per hour.  · Normal variations in heart rate      REVIEW OF SYSTEMS: As noted in HPI   CARDIOVASCULAR: +palps. No recent chest pain, arm/neck/jaw pain, or edema.  RESPIRATORY: No recent fever, cough, SOB.  : No blood in the urine  GI: No reflux, nausea, vomiting, or blood in stool.   MUSCULOSKELETAL: No falls.   NEURO: No headaches, syncope, or dizziness.  EYES: No sudden changes in vision.     Past Medical History:   Diagnosis Date    Anxiety     Chronic insomnia     >40 years    Depression     Heat stroke     HSV (herpes simplex virus) infection      Past Surgical History:   Procedure Laterality Date    COLONOSCOPY N/A 12/6/2022    Procedure: COLONOSCOPY;  Surgeon: Damien Johnson MD;  Location: [facility];  Service: Endoscopy;  Laterality: N/A;    ESOPHAGOGASTRODUODENOSCOPY N/A 12/6/2022    Procedure: EGD (ESOPHAGOGASTRODUODENOSCOPY);  Surgeon: Damien Johnson MD;  Location: [facility];  Service: Endoscopy;  Laterality: N/A;    NONE      TONSILLECTOMY  1984     Social History     Tobacco Use    Smoking status: Never    Smokeless tobacco: Never   Substance Use Topics    Alcohol use: Not Currently     Alcohol/week: 0.0 - 2.0 standard drinks of alcohol     Comment: less than once a week    Drug use: No         Objective      Vitals:    10/24/23 1409   BP: 116/71   Pulse: 65   Resp: 17       LAST EKG  Results for orders placed or performed during the hospital encounter of 08/25/22   EKG 12-lead    Collection Time: 08/25/22 10:16 AM    Narrative    Test Reason : R07.89,    Vent. Rate : 079 BPM     Atrial Rate : 079  "BPM     P-R Int : 142 ms          QRS Dur : 080 ms      QT Int : 370 ms       P-R-T Axes : 078 063 -36 degrees     QTc Int : 424 ms    Normal sinus rhythm  ST and T wave abnormality, consider inferior ischemia  Abnormal ECG  When compared with ECG of 10-SEP-2018 10:46,  Inverted T waves have replaced nonspecific T wave abnormality in Inferior  leads  Confirmed by Gordy Heller MD [id number]) on 8/27/2022 8:58:12 AM    Referred By: [email or web address]   SELF           Confirmed By:Gordy Heller MD     LIPIDS - LAST 2   Lab Results   Component Value Date    CHOL 160 08/16/2023    CHOL 179 08/16/2022    HDL 43 08/16/2023    HDL 42 08/16/2022    LDLCALC 94.0 08/16/2023    LDLCALC 105.8 08/16/2022    TRIG 115 08/16/2023    TRIG 156 (H) 08/16/2022    CHOLHDL 26.9 08/16/2023    CHOLHDL 23.5 08/16/2022     CARDIAC PROFILE - LAST 2  Lab Results   Component Value Date    TROPONINI <0.012 08/25/2022    TROPONINI <0.012 08/25/2022      CBC - LAST 2  Lab Results   Component Value Date    WBC 4.22 08/16/2023    WBC 6.61 08/25/2022    RBC 4.30 08/16/2023    RBC 4.46 08/25/2022    HGB 13.3 08/16/2023    HGB 14.2 08/25/2022    HCT 41.2 08/16/2023    HCT 42.1 08/25/2022     08/16/2023     08/25/2022     No results found for: "LABPT", "INR", "APTT"  CHEMISTRY - LAST 2  Lab Results   Component Value Date     08/16/2023     08/25/2022    K 4.6 08/16/2023    K 4.3 08/25/2022     08/16/2023     08/25/2022    CO2 26 08/16/2023    CO2 26 08/25/2022    ANIONGAP 10 08/16/2023    ANIONGAP 12 08/25/2022    BUN 13 08/16/2023    BUN 12 08/25/2022    CREATININE 0.9 08/16/2023    CREATININE 0.77 08/25/2022    GLU 93 08/16/2023    GLU 98 08/25/2022    CALCIUM 9.3 08/16/2023    CALCIUM 9.4 08/25/2022    MG 2.5 08/01/2018    ALBUMIN 3.9 08/16/2023    ALBUMIN 4.6 08/25/2022    PROT 7.0 08/16/2023    PROT 7.9 08/25/2022    ALKPHOS 66 08/16/2023    ALKPHOS 82 08/25/2022    ALT 21 08/16/2023    ALT 28 08/25/2022    AST 22 " 08/16/2023    AST 34 08/25/2022    BILITOT 0.7 08/16/2023    BILITOT 0.5 08/25/2022      ENDOCRINE - LAST 2  Lab Results   Component Value Date    HGBA1C 5.2 11/11/2020    TSH 2.618 08/16/2023    TSH 2.640 08/16/2022        PHYSICAL EXAM  CONSTITUTIONAL: Well built, well nourished in no apparent distress  NECK: no carotid bruit, no JVD  LUNGS: CTA  CHEST WALL: no tenderness  HEART: regular rate and rhythm, S1, S2 normal, no murmur, click, rub or gallop   ABDOMEN: soft, non-tender; bowel sounds normal; no masses,  no organomegaly  EXTREMITIES: Extremities normal, no edema, no calf tenderness noted  NEURO: AAO X 3    I HAVE REVIEWED :    The vital signs, most recent cardiac testing, and most recent pertinent non-cardiology provider notes.    Current Outpatient Medications   Medication Instructions    ALPRAZolam (XANAX) 0.25 MG tablet TAKE 1 TABLET(0.25 MG) BY MOUTH EVERY NIGHT AS NEEDED FOR INSOMNIA OR ANXIETY    clonazePAM (KLONOPIN) 0.5 mg, Oral, Nightly PRN    MYRBETRIQ 25 mg, Oral, Daily    neomycin-polymyxin-dexamethasone (DEXACINE) 3.5 mg/g-10,000 unit/g-0.1 % Oint APPLY SMALL AMOUNT TO THE EYELID TWICE DAILY    oxybutynin (DITROPAN-XL) 10 mg, Oral, Daily    valACYclovir (VALTREX) 500 mg, Oral, Daily      Assessment & Plan     1. Palpitations  Continue conservative management   Can try magnesium supplementation   Normal holter, echo, stress last year     2. Syncope and collapse  No further episodes            Follow up in about 9 months (around 7/24/2024).     Mel Martins PA-C   [facility]   Office: [phone]

## 2023-10-25 ENCOUNTER — CLINICAL SUPPORT (OUTPATIENT)
Dept: REHABILITATION | Facility: HOSPITAL | Age: 63
End: 2023-10-25
Payer: COMMERCIAL

## 2023-10-25 DIAGNOSIS — N81.89 PELVIC FLOOR WEAKNESS IN FEMALE: Primary | ICD-10-CM

## 2023-10-25 DIAGNOSIS — R27.8 OTHER LACK OF COORDINATION: ICD-10-CM

## 2023-10-25 PROCEDURE — 97112 NEUROMUSCULAR REEDUCATION: CPT | Mod: PN

## 2023-10-25 PROCEDURE — 97110 THERAPEUTIC EXERCISES: CPT | Mod: PN

## 2023-10-25 NOTE — PROGRESS NOTES
Pelvic Health Physical Therapy   Treatment/ Updated POC Note     Name: Mely Viera  [facility] Number: [id number]    Therapy Diagnosis:   Encounter Diagnoses   Name Primary?    Pelvic floor weakness in female Yes    Other lack of coordination          Physician: Margarette Nazario, *    Visit Date: 10/25/2023    Physician Orders: PT Eval and Treat      Medical Diagnosis from Referral:   N81.10,N81.6 (ICD-10-CM) - Cystocele with rectocele  Evaluation Date: 8/15/2023  Authorization Period Expiration: 12/31/23  Plan of Care Expiration: 1/30/24  Progress Note Due: 11/25/23  Visit # / Visits authorized:  4 at this clinic (13/20 from old auth)     FOTO: Issued Visit # 1, 4    Time In: 9:00  Time Out: 9:45  Total Billable Time: 45 minutes    Precautions: Standard    Subjective     Pt reports:  Has had a stomach virus and then strep throat;   No episodes of leakage since last visit - even with coughing.     Has been practicing urge delay strategies and is able to push void window back 20-30 min.     Doing her HEP 3x/wk while at gym.    Bladder/Bowel History:   Frequency of urination:   Daytime: Every 2 hours for the most part; sometimes goes 3 hours.  Nighttime: 1x -  getting up later in the night  Difficulty initiating urine stream: No  Urine stream: strong  Complete emptying: Yes - double voiding  Bladder leakage: Not lately  Frequency of incidents:   Amount leaked (urine): few drops, teaspoon(s), and large squirt sometimes when getting to toilet  Urinary Urgency: Yes  Able to delay the urge for at least 10 minute(s).  Frequency of bowel movements: once a day  Difficulty initiating BM: No  Quality/Shape of BM: [place] Stool Chart 4  Does Patient Feel Empty after BM? Yes  Fiber Supplements or Laxative Use?  Yes - fiber drinks and probiotic yogurt  Colon leakage: No  Form of protection: none      She was compliant with home exercise program.  Response to previous treatment:Positive  Functional change: continues to report  "improvement in urinary urgency and incontinence; no urinary incontinence since last visit; improving urinary frequency to every 2 hours most days    Pain: none reported    Constitutional Symptoms Review: The patient denies having any constitutional symptoms.     Objective   Pt verbally consents to intravaginal treatment today.  Signed consent form already on file.     Lower Extremity Strength    Right Left   Hip flexion 4+/5 4+/5   Hip extension 4+/5 4+/5   Hip abduction 4+/5 4+/5   Hip adduction 4+/5 4+/5   Hip ER 4/5 4/5   Hip IR 4/5 4/5        INTERNAL ASSESSMENT  Sensation: able to localize pressure appropriately  Pain: none  Vaginal vault: roomy  Muscle Bulk: atrophy/however trigger points of muscle guarding in levator ani  Muscle Power: 4-/5  (improved from 3+/5)  Muscle Endurance: 8 sec x10 reps  Involuntary Contraction: worsening prolapse  Bearing Down: excursion worsening prolapse  Quality of contraction: slow rise and slow relaxation  Specificity: WNL  Prolapse Screen: grade 2 cystocele and grade 1 rectocele      Manual therapy was performed for 0 min to improve muscle guarding:  -intravaginal manual release of B levator ani and obturator internus m  Pt with moderate tension noted in levator ani which responded + to direct ischemic pressure.    Mely participated in neuromuscular re-education activities to develop Coordination, Control, and Proprioception for 15 minutes including:   Kegel holds with intravaginal digital feedback - isolated and with TA co-contraction.  Pt holding 8" x10 reps without fatigue. Slow rise but sufficient hold.   -Coordination of breath and TA activation with exercises performed today      Not performed:  -NMRE with EMG biofeedback using external anal sensors. Pt with resting tone of 10 microvolts. Pt performed diaphragmatic breathing and received manual release of PFM but still registered a higher than average resting tone at 7-10 mv.   Pt is able to actively contract and lift " "pelvic floor muscles but has incomplete relaxation.   -Pt instructed in urge delay strategies to reduce urinary urgency. She was given written instructions and is to apply when strong urge occurs. Pt is to try to push void window gradually to every 2 hours.       Mely received therapeutic exercises to develop  strength, endurance, and core stabilization for 30 minutes including:   hooklying hip adduction ball squeezes 1x10 (neutral, IR and ER)  + bridges with ball squeeze 2x10   + Dead bug 2x10 B   -Isometric abdominals  1x10  +Side plank from knees 5x5"  squats with 10# KB 2x10  Deadlifts with 2  10# DB 2x10  Retro lunge with knee lift 1x10 B  Speed skaters 30"    Not performed:  +-quadruped hip extension 1x8 RTB  -quadruped clam 1x8 RTB    Next:  +Lateral band walks   +Plank    Home Exercises Provided and Patient Education Provided     Education provided:   - posture/body mechanices, isometric abdominal exercises, and coordination of breathing with TA engagement during all exercises.  -Urge delay strategies. Try to push void window to 2 hours.     Discussed progression of plan of care with patient; educated pt in activity modification; reviewed HEP with pt.     Pt demonstrated and verbalized understanding of all instruction and was provided with a handout of HEP (see Patient Instructions).    Written Home Exercises Provided: yes.  Exercises were reviewed and Mely was able to demonstrate them prior to the end of the session.  Mely demonstrated good  understanding of the education provided.     See EMR under Patient Instructions for exercises provided 8/30/2023, 9/28/23, 10/25/23    Assessment     Mely reports no urinary leakage since last visit on 9/28/23 (4 weeks) despite having illness . Improving urinary frequency to every 2 hours most days. Using urge delay strategies successfully.  Pt has improved PFM strength to 4-/5 with improved endurance - 8 sec x 10 reps. She has met all STG. Will continue PT " 2x/month to address remaining LTG.  Mely Is progressing well towards her goals.   Pt prognosis is Good.     Pt will continue to benefit from skilled outpatient physical therapy to address the deficits listed in the problem list box on initial evaluation, provide pt/family education and to maximize pt's level of independence in the home and community environment.     Pt's spiritual, cultural and educational needs considered and pt agreeable to plan of care and goals.     Anticipated barriers to physical therapy: none    Goals:   ST weeks  1)Pt will be independent with HEP for self management of symptoms.-MET  2)Pt will maintain a voiding interval of > or = 1.5 hours for improved activity tolerance (ie. prolonged driving, work meeting, watching a movie).(utilizing urge suppression techniques to assist) (met)     LT weeks  1) Pt will improve PFM strength to a 4/5 to allow for improved strength and continued maintenance of prolapse symptoms. -progressing   2) Pt to demonstrate improved B hip strength to 4+/5. -progressing  3) Pt to report decreased vaginal pressure and urgency during exercise class.-progressing   4) New goal - Pt will perform light plyometric activity without WOO  5) New goal - Pt will maintain a voiding interval of 2-3 hours during the day for improved activity tolerance.    Plan     Plan of care Certification: 10/25/2023 to 2024.     Outpatient Physical Therapy 2x/month through 24 to include the following interventions: therapeutic exercises, therapeutic activity, neuromuscular re-education, manual therapy, patient/family education, dry needling, and self care/home management    Nati Nance, PT

## 2023-10-25 NOTE — PLAN OF CARE
Pelvic Health Physical Therapy   Treatment/ Updated POC Note     Name: Mely Viera  [facility] Number: [id number]    Therapy Diagnosis:   Encounter Diagnoses   Name Primary?    Pelvic floor weakness in female Yes    Other lack of coordination          Physician: Margarette Nazario, *    Visit Date: 10/25/2023    Physician Orders: PT Eval and Treat      Medical Diagnosis from Referral:   N81.10,N81.6 (ICD-10-CM) - Cystocele with rectocele  Evaluation Date: 8/15/2023  Authorization Period Expiration: 12/31/23  Plan of Care Expiration: 1/30/24  Progress Note Due: 11/25/23  Visit # / Visits authorized:  4 at this clinic (13/20 from old auth)     FOTO: Issued Visit # 1, 4    Time In: 9:00  Time Out: 9:45  Total Billable Time: 45 minutes    Precautions: Standard    Subjective     Pt reports:  Has had a stomach virus and then strep throat;   No episodes of leakage since last visit - even with coughing.     Has been practicing urge delay strategies and is able to push void window back 20-30 min.     Doing her HEP 3x/wk while at gym.    Bladder/Bowel History:   Frequency of urination:   Daytime: Every 2 hours for the most part; sometimes goes 3 hours.  Nighttime: 1x -  getting up later in the night  Difficulty initiating urine stream: No  Urine stream: strong  Complete emptying: Yes - double voiding  Bladder leakage: Not lately  Frequency of incidents:   Amount leaked (urine): few drops, teaspoon(s), and large squirt sometimes when getting to toilet  Urinary Urgency: Yes  Able to delay the urge for at least 10 minute(s).  Frequency of bowel movements: once a day  Difficulty initiating BM: No  Quality/Shape of BM: [place] Stool Chart 4  Does Patient Feel Empty after BM? Yes  Fiber Supplements or Laxative Use?  Yes - fiber drinks and probiotic yogurt  Colon leakage: No  Form of protection: none      She was compliant with home exercise program.  Response to previous treatment:Positive  Functional change: continues to report  "improvement in urinary urgency and incontinence; no urinary incontinence since last visit; improving urinary frequency to every 2 hours most days    Pain: none reported    Constitutional Symptoms Review: The patient denies having any constitutional symptoms.     Objective   Pt verbally consents to intravaginal treatment today.  Signed consent form already on file.     Lower Extremity Strength    Right Left   Hip flexion 4+/5 4+/5   Hip extension 4+/5 4+/5   Hip abduction 4+/5 4+/5   Hip adduction 4+/5 4+/5   Hip ER 4/5 4/5   Hip IR 4/5 4/5        INTERNAL ASSESSMENT  Sensation: able to localize pressure appropriately  Pain: none  Vaginal vault: roomy  Muscle Bulk: atrophy/however trigger points of muscle guarding in levator ani  Muscle Power: 4-/5  (improved from 3+/5)  Muscle Endurance: 8 sec x10 reps  Involuntary Contraction: worsening prolapse  Bearing Down: excursion worsening prolapse  Quality of contraction: slow rise and slow relaxation  Specificity: WNL  Prolapse Screen: grade 2 cystocele and grade 1 rectocele      Manual therapy was performed for 0 min to improve muscle guarding:  -intravaginal manual release of B levator ani and obturator internus m  Pt with moderate tension noted in levator ani which responded + to direct ischemic pressure.    Mely participated in neuromuscular re-education activities to develop Coordination, Control, and Proprioception for 15 minutes including:   Kegel holds with intravaginal digital feedback - isolated and with TA co-contraction.  Pt holding 8" x10 reps without fatigue. Slow rise but sufficient hold.   -Coordination of breath and TA activation with exercises performed today      Not performed:  -NMRE with EMG biofeedback using external anal sensors. Pt with resting tone of 10 microvolts. Pt performed diaphragmatic breathing and received manual release of PFM but still registered a higher than average resting tone at 7-10 mv.   Pt is able to actively contract and lift " "pelvic floor muscles but has incomplete relaxation.   -Pt instructed in urge delay strategies to reduce urinary urgency. She was given written instructions and is to apply when strong urge occurs. Pt is to try to push void window gradually to every 2 hours.       Mely received therapeutic exercises to develop  strength, endurance, and core stabilization for 30 minutes including:   hooklying hip adduction ball squeezes 1x10 (neutral, IR and ER)  + bridges with ball squeeze 2x10   + Dead bug 2x10 B   -Isometric abdominals  1x10  +Side plank from knees 5x5"  squats with 10# KB 2x10  Deadlifts with 2  10# DB 2x10  Retro lunge with knee lift 1x10 B  Speed skaters 30"    Not performed:  +-quadruped hip extension 1x8 RTB  -quadruped clam 1x8 RTB    Next:  +Lateral band walks   +Plank    Home Exercises Provided and Patient Education Provided     Education provided:   - posture/body mechanices, isometric abdominal exercises, and coordination of breathing with TA engagement during all exercises.  -Urge delay strategies. Try to push void window to 2 hours.     Discussed progression of plan of care with patient; educated pt in activity modification; reviewed HEP with pt.     Pt demonstrated and verbalized understanding of all instruction and was provided with a handout of HEP (see Patient Instructions).    Written Home Exercises Provided: yes.  Exercises were reviewed and Mely was able to demonstrate them prior to the end of the session.  Mely demonstrated good  understanding of the education provided.     See EMR under Patient Instructions for exercises provided 8/30/2023, 9/28/23, 10/25/23    Assessment     Mely reports no urinary leakage since last visit on 9/28/23 (4 weeks) despite having illness . Improving urinary frequency to every 2 hours most days. Using urge delay strategies successfully.  Pt has improved PFM strength to 4-/5 with improved endurance - 8 sec x 10 reps. She has met all STG. Will continue PT " 2x/month to address remaining LTG.  Mely Is progressing well towards her goals.   Pt prognosis is Good.     Pt will continue to benefit from skilled outpatient physical therapy to address the deficits listed in the problem list box on initial evaluation, provide pt/family education and to maximize pt's level of independence in the home and community environment.     Pt's spiritual, cultural and educational needs considered and pt agreeable to plan of care and goals.     Anticipated barriers to physical therapy: none    Goals:   ST weeks  1)Pt will be independent with HEP for self management of symptoms.-MET  2)Pt will maintain a voiding interval of > or = 1.5 hours for improved activity tolerance (ie. prolonged driving, work meeting, watching a movie).(utilizing urge suppression techniques to assist) (met)     LT weeks  1) Pt will improve PFM strength to a 4/5 to allow for improved strength and continued maintenance of prolapse symptoms. -progressing   2) Pt to demonstrate improved B hip strength to 4+/5. -progressing  3) Pt to report decreased vaginal pressure and urgency during exercise class.-progressing   4) New goal - Pt will perform light plyometric activity without WOO  5) New goal - Pt will maintain a voiding interval of 2-3 hours during the day for improved activity tolerance.    Plan     Plan of care Certification: 10/25/2023 to 2024.     Outpatient Physical Therapy 2x/month through 24 to include the following interventions: therapeutic exercises, therapeutic activity, neuromuscular re-education, manual therapy, patient/family education, dry needling, and self care/home management    Nati Nance, PT

## 2023-11-09 ENCOUNTER — TELEPHONE (OUTPATIENT)
Dept: OBSTETRICS AND GYNECOLOGY | Facility: CLINIC | Age: 63
End: 2023-11-09
Payer: COMMERCIAL

## 2023-11-09 NOTE — TELEPHONE ENCOUNTER
----- Message from Juan Jamison sent at 11/8/2023 12:05 PM CST -----      Name of Who is Calling: [facility]          What is the request in detail: [facility] called regarding pt for a new pelvic floor order in for the pt.Please contact to further discuss and advise.          Can the clinic reply by [facility]: N      [facility]: Fax [phone] pelvic order

## 2024-02-20 PROBLEM — N81.89 PELVIC FLOOR WEAKNESS IN FEMALE: Status: RESOLVED | Noted: 2023-08-16 | Resolved: 2024-02-20

## 2024-02-20 PROBLEM — N81.10 CYSTOCELE WITH RECTOCELE: Status: RESOLVED | Noted: 2022-11-15 | Resolved: 2024-02-20

## 2024-02-20 PROBLEM — R53.1 WEAKNESS: Status: RESOLVED | Noted: 2022-11-15 | Resolved: 2024-02-20

## 2024-02-20 PROBLEM — N39.46 MIXED STRESS AND URGE URINARY INCONTINENCE: Status: RESOLVED | Noted: 2022-11-15 | Resolved: 2024-02-20

## 2024-02-20 PROBLEM — N81.6 CYSTOCELE WITH RECTOCELE: Status: RESOLVED | Noted: 2022-11-15 | Resolved: 2024-02-20

## 2024-02-20 PROBLEM — R27.8 OTHER LACK OF COORDINATION: Status: RESOLVED | Noted: 2023-08-16 | Resolved: 2024-02-20

## 2024-02-20 PROBLEM — M62.89 PELVIC FLOOR DYSFUNCTION: Status: RESOLVED | Noted: 2022-11-15 | Resolved: 2024-02-20

## 2024-03-22 DIAGNOSIS — F41.9 ANXIETY: ICD-10-CM

## 2024-03-22 NOTE — TELEPHONE ENCOUNTER
No care due was identified.  [facility] Embedded Care Due Messages. Reference number: [id number].   3/22/2024 11:39:36 AM CDT

## 2024-03-23 RX ORDER — ALPRAZOLAM 0.25 MG/1
TABLET ORAL
Qty: 30 TABLET | Refills: 0 | Status: SHIPPED | OUTPATIENT
Start: 2024-03-23

## 2024-04-30 ENCOUNTER — OFFICE VISIT (OUTPATIENT)
Dept: OBSTETRICS AND GYNECOLOGY | Facility: CLINIC | Age: 64
End: 2024-04-30
Payer: COMMERCIAL

## 2024-04-30 VITALS
HEIGHT: 67 IN | DIASTOLIC BLOOD PRESSURE: 72 MMHG | WEIGHT: 117.75 LBS | SYSTOLIC BLOOD PRESSURE: 114 MMHG | BODY MASS INDEX: 18.48 KG/M2

## 2024-04-30 DIAGNOSIS — Z12.31 ENCOUNTER FOR SCREENING MAMMOGRAM FOR MALIGNANT NEOPLASM OF BREAST: ICD-10-CM

## 2024-04-30 DIAGNOSIS — R23.4 BREAST SKIN CHANGES: Primary | ICD-10-CM

## 2024-04-30 PROCEDURE — 3078F DIAST BP <80 MM HG: CPT | Mod: CPTII,S$GLB,, | Performed by: OBSTETRICS & GYNECOLOGY

## 2024-04-30 PROCEDURE — 99213 OFFICE O/P EST LOW 20 MIN: CPT | Mod: S$GLB,,, | Performed by: OBSTETRICS & GYNECOLOGY

## 2024-04-30 PROCEDURE — 99999 PR PBB SHADOW E&M-EST. PATIENT-LVL III: CPT | Mod: PBBFAC,,, | Performed by: OBSTETRICS & GYNECOLOGY

## 2024-04-30 PROCEDURE — 3074F SYST BP LT 130 MM HG: CPT | Mod: CPTII,S$GLB,, | Performed by: OBSTETRICS & GYNECOLOGY

## 2024-04-30 PROCEDURE — 1159F MED LIST DOCD IN RCRD: CPT | Mod: CPTII,S$GLB,, | Performed by: OBSTETRICS & GYNECOLOGY

## 2024-04-30 PROCEDURE — 3008F BODY MASS INDEX DOCD: CPT | Mod: CPTII,S$GLB,, | Performed by: OBSTETRICS & GYNECOLOGY

## 2024-04-30 RX ORDER — PRENATAL VIT 91/IRON/FOLIC/DHA 28-975-200
COMBINATION PACKAGE (EA) ORAL 2 TIMES DAILY
Qty: 28.4 G | Refills: 0 | Status: SHIPPED | OUTPATIENT
Start: 2024-04-30

## 2024-04-30 NOTE — PROGRESS NOTES
Chief Complaint   Patient presents with    left breast red adn blotchy       History of Present Illness: Mely Viera is a 63 y.o. female that presents today 4/30/2024 for   Chief Complaint   Patient presents with    left breast red adn blotchy         Past Medical History:   Diagnosis Date    Anxiety     Chronic insomnia     >40 years    Depression     Heat stroke     HSV (herpes simplex virus) infection        Past Surgical History:   Procedure Laterality Date    COLONOSCOPY N/A 12/6/2022    Procedure: COLONOSCOPY;  Surgeon: Damien Johnson MD;  Location: [facility] ENDO;  Service: Endoscopy;  Laterality: N/A;    ESOPHAGOGASTRODUODENOSCOPY N/A 12/6/2022    Procedure: EGD (ESOPHAGOGASTRODUODENOSCOPY);  Surgeon: Damien Johnson MD;  Location: [facility];  Service: Endoscopy;  Laterality: N/A;    NONE      TONSILLECTOMY  1984       Outpatient Medications Prior to Visit   Medication Sig Dispense Refill    ALPRAZolam (XANAX) 0.25 MG tablet TAKE 1 TABLET(0.25 MG) BY MOUTH EVERY NIGHT AS NEEDED FOR INSOMNIA OR ANXIETY 30 tablet 0    clonazePAM (KLONOPIN) 0.5 MG tablet Take 0.5 mg by mouth nightly as needed.      neomycin-polymyxin-dexamethasone (DEXACINE) 3.5 mg/g-10,000 unit/g-0.1 % Oint APPLY SMALL AMOUNT TO THE EYELID TWICE DAILY      valACYclovir (VALTREX) 500 MG tablet Take 1 tablet (500 mg total) by mouth once daily. 90 tablet 3    mirabegron (MYRBETRIQ) 25 mg Tb24 ER tablet Take 1 tablet (25 mg total) by mouth once daily. (Patient not taking: Reported on 4/30/2024) 30 tablet 11    oxybutynin (DITROPAN-XL) 10 MG 24 hr tablet Take 1 tablet (10 mg total) by mouth once daily. (Patient not taking: Reported on 4/30/2024) 90 tablet 2     No facility-administered medications prior to visit.       Review of patient's allergies indicates:   Allergen Reactions    Shrimp Swelling     hives    Temazepam Swelling and Rash    Egg      Other reaction(s): Hives    Escitalopram      Other reaction(s): did not like effects  "      Family History   Problem Relation Name Age of Onset    Bladder Cancer Father      Diabetes Sister      Hypertension Sister      Crohn's disease Sister      Breast cancer Maternal Aunt  49    Ovarian cancer Paternal Cousin 1st 65    COPD Neg Hx      Stomach cancer Neg Hx      Esophageal cancer Neg Hx      Ulcerative colitis Neg Hx      Celiac disease Neg Hx         Social History     Tobacco Use    Smoking status: Never    Smokeless tobacco: Never   Substance Use Topics    Alcohol use: Not Currently     Alcohol/week: 0.0 - 2.0 standard drinks of alcohol     Comment: less than once a week    Drug use: No       OB History    Para Term  AB Living   1 1 1     1   SAB IAB Ectopic Multiple Live Births           1      # Outcome Date GA Lbr Ruddy/2nd Weight Sex Type Anes PTL Lv   1 Term 10/09/13   3.289 kg (7 lb 4 oz) F Vag-Spont EPI N DARON         /72   Ht 5' 7" (1.702 m)   Wt 53.4 kg (117 lb 11.6 oz)   LMP 2014   Physical Exam:  APPEARANCE: Well nourished, well developed, in no acute distress.  SKIN: Normal skin turgor, no lesions.  NECK: Neck symmetric without masses   RESPIRATORY: Normal respiratory effort with no retractions or use of accessory muscles  CARDIOVASCULAR: Peripheral vascular system with no swelling no varicosities and palpation of pulses normal  LYMPHATIC: No enlargements of the lymph nodes noted in the neck, axillae, or groin  ABDOMEN: Soft. No tenderness or masses. No hepatosplenomegaly. No hernias.  BREASTS: Symmetrical, ++ red flat round rash of the left breast   no visible lesions. No palpable masses, nipple discharge or adenopathy bilaterally.EXTREMITIES: No clubbing cyanosis or edema.    ASSESSMENT/PLAN:  Breast skin changes  -     terbinafine HCL (LAMISIL) 1 % cream; Apply topically 2 (two) times daily.  Dispense: 28.4 g; Refill: 0    Encounter for screening mammogram for malignant neoplasm of breast  -     Mammo Digital Screening Bilat w/ Inocente; Future; Expected " date: 04/30/2024          20 minutes spent today preparing reviewing previous external notes, reviewing previous results, performing medical examination, orders tests or medications, counseling and documenting.

## 2024-05-08 ENCOUNTER — PATIENT OUTREACH (OUTPATIENT)
Dept: ADMINISTRATIVE | Facility: HOSPITAL | Age: 64
End: 2024-05-08
Payer: COMMERCIAL

## 2024-06-20 ENCOUNTER — PATIENT MESSAGE (OUTPATIENT)
Dept: OBSTETRICS AND GYNECOLOGY | Facility: CLINIC | Age: 64
End: 2024-06-20
Payer: COMMERCIAL

## 2024-06-21 RX ORDER — CLOBETASOL PROPIONATE 0.5 MG/G
CREAM TOPICAL 2 TIMES DAILY
Qty: 15 G | Refills: 0 | Status: SHIPPED | OUTPATIENT
Start: 2024-06-21

## 2024-07-19 ENCOUNTER — TELEPHONE (OUTPATIENT)
Dept: OBSTETRICS AND GYNECOLOGY | Facility: CLINIC | Age: 64
End: 2024-07-19
Payer: COMMERCIAL

## 2024-07-19 NOTE — TELEPHONE ENCOUNTER
----- Message from Asiya Baker sent at 7/19/2024  9:31 AM CDT -----  Type:  Same Day Appointment Request    Caller is requesting a same day appointment.  Caller declined first available appointment listed below.      Name of Caller:  pt  When is the first available appointment?  8/2--said she need to be seen today--please call and advise  Symptoms:  bladder problem/concerns  Best Call Back Number:  [phone]  Additional Information:   thank you  
Appt scheduled with Dr Nazario for 7/22 at 10:20.  
DISPLAY PLAN FREE TEXT

## 2024-07-22 ENCOUNTER — OFFICE VISIT (OUTPATIENT)
Dept: OBSTETRICS AND GYNECOLOGY | Facility: CLINIC | Age: 64
End: 2024-07-22
Payer: COMMERCIAL

## 2024-07-22 VITALS
BODY MASS INDEX: 18.55 KG/M2 | HEIGHT: 67 IN | DIASTOLIC BLOOD PRESSURE: 72 MMHG | SYSTOLIC BLOOD PRESSURE: 112 MMHG | WEIGHT: 118.19 LBS

## 2024-07-22 DIAGNOSIS — A60.9 HSV (HERPES SIMPLEX VIRUS) ANOGENITAL INFECTION: ICD-10-CM

## 2024-07-22 DIAGNOSIS — N81.6 CYSTOCELE WITH RECTOCELE: ICD-10-CM

## 2024-07-22 DIAGNOSIS — N81.10 CYSTOCELE WITH RECTOCELE: ICD-10-CM

## 2024-07-22 DIAGNOSIS — A60.00 GENITAL HERPES SIMPLEX, UNSPECIFIED SITE: Primary | ICD-10-CM

## 2024-07-22 PROCEDURE — 87529 HSV DNA AMP PROBE: CPT | Performed by: OBSTETRICS & GYNECOLOGY

## 2024-07-22 PROCEDURE — 3008F BODY MASS INDEX DOCD: CPT | Mod: CPTII,S$GLB,, | Performed by: OBSTETRICS & GYNECOLOGY

## 2024-07-22 PROCEDURE — 99999 PR PBB SHADOW E&M-EST. PATIENT-LVL III: CPT | Mod: PBBFAC,,, | Performed by: OBSTETRICS & GYNECOLOGY

## 2024-07-22 PROCEDURE — 1159F MED LIST DOCD IN RCRD: CPT | Mod: CPTII,S$GLB,, | Performed by: OBSTETRICS & GYNECOLOGY

## 2024-07-22 PROCEDURE — 99214 OFFICE O/P EST MOD 30 MIN: CPT | Mod: S$GLB,,, | Performed by: OBSTETRICS & GYNECOLOGY

## 2024-07-22 PROCEDURE — 3078F DIAST BP <80 MM HG: CPT | Mod: CPTII,S$GLB,, | Performed by: OBSTETRICS & GYNECOLOGY

## 2024-07-22 PROCEDURE — 3074F SYST BP LT 130 MM HG: CPT | Mod: CPTII,S$GLB,, | Performed by: OBSTETRICS & GYNECOLOGY

## 2024-07-22 RX ORDER — VALACYCLOVIR HYDROCHLORIDE 500 MG/1
500 TABLET, FILM COATED ORAL DAILY
Qty: 90 TABLET | Refills: 3 | Status: SHIPPED | OUTPATIENT
Start: 2024-07-22 | End: 2025-07-22

## 2024-07-22 NOTE — PROGRESS NOTES
Chief Complaint   Patient presents with    Vaginal Pain     Swelling that  has been going on since last Thursday.    Medication Refill     Valtrex 500mg       History of Present Illness: Mely Viera is a 63 y.o. female that presents today 7/22/2024 with LMP Patient's last menstrual period was 08/11/2014. for   Chief Complaint   Patient presents with    Vaginal Pain     Swelling that  has been going on since last Thursday.    Medication Refill     Valtrex 500mg         Past Medical History:   Diagnosis Date    Anxiety     Chronic insomnia     >40 years    Depression     Heat stroke     HSV (herpes simplex virus) infection        Past Surgical History:   Procedure Laterality Date    COLONOSCOPY N/A 12/6/2022    Procedure: COLONOSCOPY;  Surgeon: Damien Johnson MD;  Location: [facility];  Service: Endoscopy;  Laterality: N/A;    ESOPHAGOGASTRODUODENOSCOPY N/A 12/6/2022    Procedure: EGD (ESOPHAGOGASTRODUODENOSCOPY);  Surgeon: Damien Johnson MD;  Location: [facility];  Service: Endoscopy;  Laterality: N/A;    NONE      TONSILLECTOMY  1984       Outpatient Medications Prior to Visit   Medication Sig Dispense Refill    ALPRAZolam (XANAX) 0.25 MG tablet TAKE 1 TABLET(0.25 MG) BY MOUTH EVERY NIGHT AS NEEDED FOR INSOMNIA OR ANXIETY 30 tablet 0    clobetasoL (TEMOVATE) 0.05 % cream Apply topically 2 (two) times daily. 15 g 0    clonazePAM (KLONOPIN) 0.5 MG tablet Take 0.5 mg by mouth nightly as needed.      terbinafine HCL (LAMISIL) 1 % cream Apply topically 2 (two) times daily. 28.4 g 0    valACYclovir (VALTREX) 500 MG tablet Take 1 tablet (500 mg total) by mouth once daily. 90 tablet 3    mirabegron (MYRBETRIQ) 25 mg Tb24 ER tablet Take 1 tablet (25 mg total) by mouth once daily. (Patient not taking: Reported on 4/30/2024) 30 tablet 11    neomycin-polymyxin-dexamethasone (DEXACINE) 3.5 mg/g-10,000 unit/g-0.1 % Oint APPLY SMALL AMOUNT TO THE EYELID TWICE DAILY (Patient not taking: Reported on 7/22/2024)       "oxybutynin (DITROPAN-XL) 10 MG 24 hr tablet Take 1 tablet (10 mg total) by mouth once daily. (Patient not taking: Reported on 2024) 90 tablet 2     No facility-administered medications prior to visit.       Review of patient's allergies indicates:   Allergen Reactions    Shrimp Swelling     hives    Temazepam Swelling and Rash    Egg      Other reaction(s): Hives    Escitalopram      Other reaction(s): did not like effects       Family History   Problem Relation Name Age of Onset    Bladder Cancer Father      Diabetes Sister      Hypertension Sister      Crohn's disease Sister      Breast cancer Maternal Aunt  49    Ovarian cancer Paternal Cousin 1st 65    COPD Neg Hx      Stomach cancer Neg Hx      Esophageal cancer Neg Hx      Ulcerative colitis Neg Hx      Celiac disease Neg Hx         Social History     Tobacco Use    Smoking status: Never    Smokeless tobacco: Never   Substance Use Topics    Alcohol use: Not Currently     Alcohol/week: 0.0 - 2.0 standard drinks of alcohol     Comment: less than once a week    Drug use: No       OB History    Para Term  AB Living   1 1 1     1   SAB IAB Ectopic Multiple Live Births           1      # Outcome Date GA Lbr Ruddy/2nd Weight Sex Type Anes PTL Lv   1 Term 10/09/13   3.289 kg (7 lb 4 oz) F Vag-Spont EPI N DARON         /72   Ht 5' 7" (1.702 m)   Wt 53.6 kg (118 lb 2.7 oz)   LMP 2014   Physical Exam:  APPEARANCE: Well nourished, well developed, in no acute distress.  SKIN: Normal skin turgor, no lesions.  NECK: Neck symmetric without masses   RESPIRATORY: Normal respiratory effort with no retractions or use of accessory muscles  CARDIOVASCULAR: Peripheral vascular system with no swelling no varicosities and palpation of pulses normal  LYMPHATIC: No enlargements of the lymph nodes noted in the neck, axillae, or groin  ABDOMEN: Soft. No tenderness or masses. No hepatosplenomegaly. No hernias.  PELVIC: Normal external female genitalia with " right minora ulceration +++   Normal hair distribution. Adequate perineal body, normal urethral meatus. Urethra with no masses.  Bladder nontender. Vagina moist and well rugated without lesions or discharge. Cervix pink and without lesions. Grade 2 cystocele grade 1 rectocele. Bimanual exam showed uterus normal size, shape, position, mobile and nontender. Adnexa without masses or tenderness. Urethra and bladder normal.  EXTREMITIES: No clubbing cyanosis or edema.    ASSESSMENT/PLAN:  Genital herpes simplex, unspecified site  -     HSV by Rapid PCR, Non-Blood; Future; Expected date: 07/22/2024  -     valACYclovir (VALTREX) 500 MG tablet; Take 1 tablet (500 mg total) by mouth once daily.  Dispense: 90 tablet; Refill: 3    Cystocele with rectocele  -     Ambulatory referral/consult to Urogynecology; Future; Expected date: 07/29/2024    HSV (herpes simplex virus) anogenital infection  -     valACYclovir (VALTREX) 500 MG tablet; Take 1 tablet (500 mg total) by mouth once daily.  Dispense: 90 tablet; Refill: 3        30 minutes spent today spent preparing reviewing previous external notes, reviewing previous results, performing medical examination, ordering tests or medications, counseling and documenting.        Assistance with ambulation/Assistance OOB with selected safe patient handling equipment/Communicate Risk of Fall with Harm to all staff/Monitor gait and stability/Reinforce activity limits and safety measures with patient and family/Sit up slowly, dangle for a short time, stand at bedside before walking/Tailored Fall Risk Interventions/Use of alarms - bed, chair and/or voice tab/Visual Cue: Yellow wristband and red socks/Bed in lowest position, wheels locked, appropriate side rails in place/Call bell, personal items and telephone in reach/Instruct patient to call for assistance before getting out of bed or chair/Non-slip footwear when patient is out of bed/[place] to call system/Physically safe environment - no spills, clutter or unnecessary equipment/Purposeful Proactive Rounding/Room/bathroom lighting operational, light cord in reach

## 2024-07-24 NOTE — PROGRESS NOTES
Subjective:    Patient ID:  Mely Viera is a 63 y.o. female who presents for NSVT (nonsustained ventricular tachycardia), Follow-up, and Heart Problem        HPI    NEW PATIENT EVALUATION SEEN BY , DR FRANCIS, LAST SEEN BY PA, LABS FROM LAST YEAR LDL 94, HDL 43, DOING WELL,H/O NSVT AFTER HEAT STROKE, , OCC LIGHTHEADEDNESS, PALP EVERY OTHER WEEK, HAD PALP ,LAST NIGHT WITH CHEST DISCOMFORT, NEAR SYNCOPE, FH OF CAD,  SEE ROS  Past Medical History:   Diagnosis Date    Anxiety     Chronic insomnia     >40 years    Depression     Heat stroke     HSV (herpes simplex virus) infection      Past Surgical History:   Procedure Laterality Date    COLONOSCOPY N/A 12/6/2022    Procedure: COLONOSCOPY;  Surgeon: Damien Johnson MD;  Location: [facility];  Service: Endoscopy;  Laterality: N/A;    ESOPHAGOGASTRODUODENOSCOPY N/A 12/6/2022    Procedure: EGD (ESOPHAGOGASTRODUODENOSCOPY);  Surgeon: Damien Johnson MD;  Location: [facility];  Service: Endoscopy;  Laterality: N/A;    NONE      TONSILLECTOMY  1984     Family History   Problem Relation Name Age of Onset    Bladder Cancer Father      Diabetes Sister      Hypertension Sister      Crohn's disease Sister      Breast cancer Maternal Aunt  49    Ovarian cancer Paternal Cousin 1st 65    COPD Neg Hx      Stomach cancer Neg Hx      Esophageal cancer Neg Hx      Ulcerative colitis Neg Hx      Celiac disease Neg Hx       Social History     Socioeconomic History    Marital status: Single    Number of children: 1   Occupational History    Occupation: marketing     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   Tobacco Use    Smoking status: Never    Smokeless tobacco: Never   Substance and Sexual Activity    Alcohol use: Not Currently     Alcohol/week: 0.0 - 2.0 standard drinks of alcohol     Comment: less than once a week    Drug use: No    Sexual activity: Not Currently     Partners: Male     Birth control/protection: None, Post-menopausal     Social Determinants of Health      Financial Resource Strain: Low Risk  (7/25/2024)    Overall Financial Resource Strain (CARDIA)     Difficulty of Paying Living Expenses: Not hard at all   Food Insecurity: No Food Insecurity (7/25/2024)    Hunger Vital Sign     Worried About Running Out of Food in the Last Year: Never true     Ran Out of Food in the Last Year: Never true   Transportation Needs: No Transportation Needs (10/23/2023)    PRAPARE - Transportation     Lack of Transportation (Medical): No     Lack of Transportation (Non-Medical): No   Physical Activity: Sufficiently Active (7/25/2024)    Exercise Vital Sign     Days of Exercise per Week: 3 days     Minutes of Exercise per Session: 60 min   Stress: Stress Concern Present (7/25/2024)    Citizen of [place] of Occup[employer] - Occupational Stress Questionnaire     Feeling of Stress : Very much   Housing Stability: Low Risk  (10/23/2023)    Housing Stability Vital Sign     Unable to Pay for Housing in the Last Year: No     Number of Places Lived in the Last Year: 1     Unstable Housing in the Last Year: No       Review of patient's allergies indicates:   Allergen Reactions    Shrimp Swelling     hives    Temazepam Swelling and Rash    Egg      Other reaction(s): Hives    Escitalopram      Other reaction(s): did not like effects       Current Outpatient Medications:     clonazePAM (KLONOPIN) 0.5 MG tablet, Take 0.5 mg by mouth nightly as needed., Disp: , Rfl:     valACYclovir (VALTREX) 500 MG tablet, Take 1 tablet (500 mg total) by mouth once daily., Disp: 90 tablet, Rfl: 3    ALPRAZolam (XANAX) 0.25 MG tablet, TAKE 1 TABLET(0.25 MG) BY MOUTH EVERY NIGHT AS NEEDED FOR INSOMNIA OR ANXIETY (Patient not taking: Reported on 7/25/2024), Disp: 30 tablet, Rfl: 0    clobetasoL (TEMOVATE) 0.05 % cream, Apply topically 2 (two) times daily. (Patient not taking: Reported on 7/25/2024), Disp: 15 g, Rfl: 0    mirabegron (MYRBETRIQ) 25 mg Tb24 ER tablet, Take 1 tablet (25 mg total) by mouth once daily.  "(Patient not taking: Reported on 4/30/2024), Disp: 30 tablet, Rfl: 11    neomycin-polymyxin-dexamethasone (DEXACINE) 3.5 mg/g-10,000 unit/g-0.1 % Oint, APPLY SMALL AMOUNT TO THE EYELID TWICE DAILY (Patient not taking: Reported on 7/22/2024), Disp: , Rfl:     terbinafine HCL (LAMISIL) 1 % cream, Apply topically 2 (two) times daily. (Patient not taking: Reported on 7/25/2024), Disp: 28.4 g, Rfl: 0    Review of Systems   Constitutional: Negative for chills, decreased appetite, diaphoresis and fever.   Eyes:  Negative for blurred vision and visual disturbance.   Cardiovascular:  Positive for chest pain, dyspnea on exertion (MILD), irregular heartbeat, near-syncope and palpitations.   Respiratory:  Negative for cough, hemoptysis and shortness of breath.    Endocrine: Negative for polyphagia and polyuria.   Hematologic/Lymphatic: Negative for adenopathy. Does not bruise/bleed easily.   Musculoskeletal:  Negative for back pain and falls.   Gastrointestinal:  Negative for abdominal pain, dysphagia, jaundice and melena.   Genitourinary:  Negative for dysuria and flank pain.   Neurological:  Positive for dizziness. Negative for brief paralysis, loss of balance and numbness.   Psychiatric/Behavioral:  Negative for altered mental status and depression. The patient has insomnia.    Allergic/Immunologic: Negative for hives and persistent infections.        Objective:      Vitals:    07/25/24 1232   BP: (!) 96/59   Pulse: 69   Weight: 53.7 kg (118 lb 6.2 oz)   Height: 5' 7" (1.702 m)   PainSc:   6   PainLoc: Chest     Body mass index is 18.54 kg/m².    Physical Exam  Constitutional:       Appearance: Normal appearance. She is underweight.   Neck:      Vascular: No carotid bruit or JVD.   Cardiovascular:      Rate and Rhythm: Normal rate and regular rhythm. No extrasystoles are present.     Pulses:           Carotid pulses are 2+ on the right side and 2+ on the left side.       Radial pulses are 2+ on the right side and 2+ on the " left side.        Posterior tibial pulses are 2+ on the right side and 2+ on the left side.      Heart sounds: Murmur heard.      Systolic murmur is present with a grade of 1/6.      No friction rub. No gallop.   Pulmonary:      Effort: Pulmonary effort is normal.      Breath sounds: Normal breath sounds and air entry.   Musculoskeletal:      Cervical back: Neck supple.      Right lower leg: No edema.      Left lower leg: No edema.   Skin:     Capillary Refill: Capillary refill takes less than 2 seconds.   Neurological:      General: No focal deficit present.      Mental Status: She is alert and oriented to person, place, and time.      Cranial Nerves: No cranial nerve deficit.   Psychiatric:         Mood and Affect: Mood normal.         Speech: Speech normal.         Behavior: Behavior normal.                 ..    Chemistry        Component Value Date/Time     07/25/2024 1320    K 4.1 07/25/2024 1320     07/25/2024 1320    CO2 27 07/25/2024 1320    BUN 15 07/25/2024 1320    CREATININE 0.9 07/25/2024 1320    GLU 74 07/25/2024 1320        Component Value Date/Time    CALCIUM 9.6 07/25/2024 1320    ALKPHOS 76 07/25/2024 1320    AST 19 07/25/2024 1320    ALT 16 07/25/2024 1320    BILITOT 0.5 07/25/2024 1320    ESTGFRAFRICA >60.0 11/11/2020 0742    EGFRNONAA >60.0 11/11/2020 0742            ..  Lab Results   Component Value Date    CHOL 160 08/16/2023    CHOL 179 08/16/2022    CHOL 187 11/11/2020     Lab Results   Component Value Date    HDL 43 08/16/2023    HDL 42 08/16/2022    HDL 44 11/11/2020     Lab Results   Component Value Date    LDLCALC 94.0 08/16/2023    LDLCALC 105.8 08/16/2022    LDLCALC 109.0 11/11/2020     Lab Results   Component Value Date    TRIG 115 08/16/2023    TRIG 156 (H) 08/16/2022    TRIG 170 (H) 11/11/2020     Lab Results   Component Value Date    CHOLHDL 26.9 08/16/2023    CHOLHDL 23.5 08/16/2022    CHOLHDL 23.5 11/11/2020     ..  Lab Results   Component Value Date    WBC 5.08  07/25/2024    HGB 13.4 07/25/2024    HCT 41.8 07/25/2024    MCV 98 07/25/2024     07/25/2024       Test(s) Reviewed  I have reviewed the following in detail:  [] Stress test   [] Angiography   [] Echocardiogram   [x] Labs   [x] Other:       Assessment:         ICD-10-CM ICD-9-CM   1. NSVT (nonsustained ventricular tachycardia)  I47.29 427.1   2. Chest discomfort  R07.89 786.59   3. Near syncope  R55 780.2   4. Tricuspid valve insufficiency, unspecified etiology  I07.1 397.0   5. Palpitations  R00.2 785.1   6. Family history of ASCVD  Z82.49 V17.49   7. BAGLEY (dyspnea on exertion)  R06.09 786.09     Problem List Items Addressed This Visit          Cardiac/Vascular    Palpitations    Relevant Orders    TSH (Completed)    Magnesium (Completed)    NSVT (nonsustained ventricular tachycardia) - Primary    Relevant Orders    Cardiac event monitor    Stress Echo Which stress agent will be used? Treadmill Exercise; Color Flow Doppler? Yes    Comprehensive Metabolic Panel (Completed)    CBC Auto Differential (Completed)    Hemoglobin A1C (Completed)    TSH (Completed)    Magnesium (Completed)    Tricuspid valve insufficiency    Relevant Orders    Stress Echo Which stress agent will be used? Treadmill Exercise; Color Flow Doppler? Yes    Family history of ASCVD    Relevant Orders    Hemoglobin A1C (Completed)    RESOLVED: Angina pectoris, unspecified       Other    Near syncope    Relevant Orders    Stress Echo Which stress agent will be used? Treadmill Exercise; Color Flow Doppler? Yes    CV Ultrasound Bilateral Doppler Carotid     Other Visit Diagnoses       BAGLEY (dyspnea on exertion)        Relevant Orders    Stress Echo Which stress agent will be used? Treadmill Exercise; Color Flow Doppler? Yes             Plan:     WILL NEED FURTHER EVALUATION CHECK LABS CAROTID ULTRASOUND STRESS ECHO TO ASSESS FOR ISCHEMIA ASSESS VALVULAR DISEASE 1 MONTH EVENT MONITOR IN VIEW OF INTERMITTENT SYMPTOMS NO OVERT HEART FAILURE DIET  EXERCISE AVOID DEHYDRATION KEEP ELECTROLYTES IN CHECK, RETURN TO CLINIC IN FEW WEEKS AFTER TESTS      NSVT (nonsustained ventricular tachycardia)  -     Cardiac event monitor; Future  -     Stress Echo Which stress agent will be used? Treadmill Exercise; Color Flow Doppler? Yes; Future  -     Comprehensive Metabolic Panel; Future; Expected date: 07/25/2024  -     CBC Auto Differential; Future; Expected date: 07/25/2024  -     Hemoglobin A1C; Future; Expected date: 07/25/2024  -     TSH; Future; Expected date: 07/25/2024  -     Magnesium; Future; Expected date: 07/25/2024    Chest discomfort  -     Stress Echo Which stress agent will be used? Treadmill Exercise; Color Flow Doppler? Yes; Future  -     CBC Auto Differential; Future; Expected date: 07/25/2024    Near syncope  -     Stress Echo Which stress agent will be used? Treadmill Exercise; Color Flow Doppler? Yes; Future  -     CV Ultrasound Bilateral Doppler Carotid; Future    Tricuspid valve insufficiency, unspecified etiology  -     Stress Echo Which stress agent will be used? Treadmill Exercise; Color Flow Doppler? Yes; Future    Palpitations  -     TSH; Future; Expected date: 07/25/2024  -     Magnesium; Future; Expected date: 07/25/2024    Family history of ASCVD  -     Hemoglobin A1C; Future; Expected date: 07/25/2024    BAGLEY (dyspnea on exertion)  -     Stress Echo Which stress agent will be used? Treadmill Exercise; Color Flow Doppler? Yes; Future    RTC Low level/low impact aerobic exercise 5x's/wk. Heart healthy diet and risk factor modification.    See labs and med orders.    Aerobic exercise 5x's/wk. Heart healthy diet and risk factor modification.    See labs and med orders.

## 2024-07-25 ENCOUNTER — OFFICE VISIT (OUTPATIENT)
Dept: CARDIOLOGY | Facility: CLINIC | Age: 64
End: 2024-07-25
Payer: COMMERCIAL

## 2024-07-25 ENCOUNTER — LAB VISIT (OUTPATIENT)
Dept: LAB | Facility: HOSPITAL | Age: 64
End: 2024-07-25
Attending: INTERNAL MEDICINE
Payer: COMMERCIAL

## 2024-07-25 VITALS
HEART RATE: 69 BPM | BODY MASS INDEX: 18.58 KG/M2 | WEIGHT: 118.38 LBS | DIASTOLIC BLOOD PRESSURE: 59 MMHG | SYSTOLIC BLOOD PRESSURE: 96 MMHG | HEIGHT: 67 IN

## 2024-07-25 DIAGNOSIS — R07.89 CHEST DISCOMFORT: ICD-10-CM

## 2024-07-25 DIAGNOSIS — I47.29 NSVT (NONSUSTAINED VENTRICULAR TACHYCARDIA): Primary | Chronic | ICD-10-CM

## 2024-07-25 DIAGNOSIS — R06.09 DOE (DYSPNEA ON EXERTION): ICD-10-CM

## 2024-07-25 DIAGNOSIS — R00.2 PALPITATIONS: ICD-10-CM

## 2024-07-25 DIAGNOSIS — I47.29 NSVT (NONSUSTAINED VENTRICULAR TACHYCARDIA): ICD-10-CM

## 2024-07-25 DIAGNOSIS — Z82.49 FAMILY HISTORY OF ASCVD: Chronic | ICD-10-CM

## 2024-07-25 DIAGNOSIS — I07.1 TRICUSPID VALVE INSUFFICIENCY, UNSPECIFIED ETIOLOGY: Chronic | ICD-10-CM

## 2024-07-25 DIAGNOSIS — R55 NEAR SYNCOPE: ICD-10-CM

## 2024-07-25 DIAGNOSIS — Z82.49 FAMILY HISTORY OF ASCVD: ICD-10-CM

## 2024-07-25 LAB
ALBUMIN SERPL BCP-MCNC: 4 G/DL (ref 3.5–5.2)
ALP SERPL-CCNC: 76 U/L (ref 55–135)
ALT SERPL W/O P-5'-P-CCNC: 16 U/L (ref 10–44)
ANION GAP SERPL CALC-SCNC: 10 MMOL/L (ref 8–16)
AST SERPL-CCNC: 19 U/L (ref 10–40)
BASOPHILS # BLD AUTO: 0.02 K/UL (ref 0–0.2)
BASOPHILS NFR BLD: 0.4 % (ref 0–1.9)
BILIRUB SERPL-MCNC: 0.5 MG/DL (ref 0.1–1)
BUN SERPL-MCNC: 15 MG/DL (ref 8–23)
CALCIUM SERPL-MCNC: 9.6 MG/DL (ref 8.7–10.5)
CHLORIDE SERPL-SCNC: 104 MMOL/L (ref 95–110)
CO2 SERPL-SCNC: 27 MMOL/L (ref 23–29)
CREAT SERPL-MCNC: 0.9 MG/DL (ref 0.5–1.4)
DIFFERENTIAL METHOD BLD: ABNORMAL
EOSINOPHIL # BLD AUTO: 0.1 K/UL (ref 0–0.5)
EOSINOPHIL NFR BLD: 1.6 % (ref 0–8)
ERYTHROCYTE [DISTWIDTH] IN BLOOD BY AUTOMATED COUNT: 13.2 % (ref 11.5–14.5)
EST. GFR  (NO RACE VARIABLE): >60 ML/MIN/1.73 M^2
ESTIMATED AVG GLUCOSE: 105 MG/DL (ref 68–131)
GLUCOSE SERPL-MCNC: 74 MG/DL (ref 70–110)
HBA1C MFR BLD: 5.3 % (ref 4–5.6)
HCT VFR BLD AUTO: 41.8 % (ref 37–48.5)
HGB BLD-MCNC: 13.4 G/DL (ref 12–16)
IMM GRANULOCYTES # BLD AUTO: 0.01 K/UL (ref 0–0.04)
IMM GRANULOCYTES NFR BLD AUTO: 0.2 % (ref 0–0.5)
LYMPHOCYTES # BLD AUTO: 1.5 K/UL (ref 1–4.8)
LYMPHOCYTES NFR BLD: 30.1 % (ref 18–48)
MAGNESIUM SERPL-MCNC: 2.2 MG/DL (ref 1.6–2.6)
MCH RBC QN AUTO: 31.5 PG (ref 27–31)
MCHC RBC AUTO-ENTMCNC: 32.1 G/DL (ref 32–36)
MCV RBC AUTO: 98 FL (ref 82–98)
MONOCYTES # BLD AUTO: 0.2 K/UL (ref 0.3–1)
MONOCYTES NFR BLD: 4.5 % (ref 4–15)
NEUTROPHILS # BLD AUTO: 3.2 K/UL (ref 1.8–7.7)
NEUTROPHILS NFR BLD: 63.2 % (ref 38–73)
NRBC BLD-RTO: 0 /100 WBC
PLATELET # BLD AUTO: 222 K/UL (ref 150–450)
PMV BLD AUTO: 11.9 FL (ref 9.2–12.9)
POTASSIUM SERPL-SCNC: 4.1 MMOL/L (ref 3.5–5.1)
PROT SERPL-MCNC: 7.2 G/DL (ref 6–8.4)
RBC # BLD AUTO: 4.25 M/UL (ref 4–5.4)
SODIUM SERPL-SCNC: 141 MMOL/L (ref 136–145)
TSH SERPL DL<=0.005 MIU/L-ACNC: 1.31 UIU/ML (ref 0.4–4)
WBC # BLD AUTO: 5.08 K/UL (ref 3.9–12.7)

## 2024-07-25 PROCEDURE — 80053 COMPREHEN METABOLIC PANEL: CPT | Performed by: INTERNAL MEDICINE

## 2024-07-25 PROCEDURE — 3008F BODY MASS INDEX DOCD: CPT | Mod: CPTII,S$GLB,, | Performed by: INTERNAL MEDICINE

## 2024-07-25 PROCEDURE — 84443 ASSAY THYROID STIM HORMONE: CPT | Performed by: INTERNAL MEDICINE

## 2024-07-25 PROCEDURE — 83036 HEMOGLOBIN GLYCOSYLATED A1C: CPT | Performed by: INTERNAL MEDICINE

## 2024-07-25 PROCEDURE — 99999 PR PBB SHADOW E&M-EST. PATIENT-LVL III: CPT | Mod: PBBFAC,,, | Performed by: INTERNAL MEDICINE

## 2024-07-25 PROCEDURE — 3078F DIAST BP <80 MM HG: CPT | Mod: CPTII,S$GLB,, | Performed by: INTERNAL MEDICINE

## 2024-07-25 PROCEDURE — 3074F SYST BP LT 130 MM HG: CPT | Mod: CPTII,S$GLB,, | Performed by: INTERNAL MEDICINE

## 2024-07-25 PROCEDURE — 99204 OFFICE O/P NEW MOD 45 MIN: CPT | Mod: S$GLB,,, | Performed by: INTERNAL MEDICINE

## 2024-07-25 PROCEDURE — 3044F HG A1C LEVEL LT 7.0%: CPT | Mod: CPTII,S$GLB,, | Performed by: INTERNAL MEDICINE

## 2024-07-25 PROCEDURE — 36415 COLL VENOUS BLD VENIPUNCTURE: CPT | Mod: PO | Performed by: INTERNAL MEDICINE

## 2024-07-25 PROCEDURE — 1159F MED LIST DOCD IN RCRD: CPT | Mod: CPTII,S$GLB,, | Performed by: INTERNAL MEDICINE

## 2024-07-25 PROCEDURE — 85025 COMPLETE CBC W/AUTO DIFF WBC: CPT | Performed by: INTERNAL MEDICINE

## 2024-07-25 PROCEDURE — 83735 ASSAY OF MAGNESIUM: CPT | Performed by: INTERNAL MEDICINE

## 2024-07-27 PROBLEM — I20.9 ANGINA PECTORIS, UNSPECIFIED: Status: RESOLVED | Noted: 2023-02-10 | Resolved: 2024-07-27

## 2024-07-27 LAB
HSV1 DNA SPEC QL NAA+PROBE: NEGATIVE
HSV2 DNA SPEC QL NAA+PROBE: NEGATIVE
SPECIMEN SOURCE: NORMAL

## 2024-08-12 ENCOUNTER — HOSPITAL ENCOUNTER (OUTPATIENT)
Dept: CARDIOLOGY | Facility: HOSPITAL | Age: 64
Discharge: HOME | End: 2024-08-12
Attending: INTERNAL MEDICINE
Payer: COMMERCIAL

## 2024-08-12 VITALS — BODY MASS INDEX: 18.52 KG/M2 | HEIGHT: 67 IN | WEIGHT: 118 LBS

## 2024-08-12 DIAGNOSIS — R07.89 CHEST DISCOMFORT: ICD-10-CM

## 2024-08-12 DIAGNOSIS — I07.1 TRICUSPID VALVE INSUFFICIENCY, UNSPECIFIED ETIOLOGY: Chronic | ICD-10-CM

## 2024-08-12 DIAGNOSIS — R06.09 DOE (DYSPNEA ON EXERTION): ICD-10-CM

## 2024-08-12 DIAGNOSIS — I47.29 NSVT (NONSUSTAINED VENTRICULAR TACHYCARDIA): Chronic | ICD-10-CM

## 2024-08-12 DIAGNOSIS — R55 NEAR SYNCOPE: ICD-10-CM

## 2024-08-12 LAB
ASCENDING AORTA: 2.59 CM
AV INDEX (PROSTH): 0.65
AV MEAN GRADIENT: 4 MMHG
AV PEAK GRADIENT: 6 MMHG
AV VALVE AREA BY VELOCITY RATIO: 2.56 CM²
AV VALVE AREA: 2.05 CM²
AV VELOCITY RATIO: 0.82
BSA FOR ECHO PROCEDURE: 1.59 M2
CV ECHO LV RWT: 0.37 CM
CV STRESS BASE HR: 60 BPM
DIASTOLIC BLOOD PRESSURE: 88 MMHG
DOP CALC AO PEAK VEL: 1.25 M/S
DOP CALC AO VTI: 33.7 CM
DOP CALC LVOT AREA: 3.1 CM2
DOP CALC LVOT DIAMETER: 2 CM
DOP CALC LVOT PEAK VEL: 1.02 M/S
DOP CALC LVOT STROKE VOLUME: 69.08 CM3
DOP CALCLVOT PEAK VEL VTI: 22 CM
E WAVE DECELERATION TIME: 248.01 MSEC
E/A RATIO: 1.48
E/E' RATIO: 6.73 M/S
ECHO LV POSTERIOR WALL: 0.81 CM (ref 0.6–1.1)
EJECTION FRACTION: 65 %
FRACTIONAL SHORTENING: 35 % (ref 28–44)
INTERVENTRICULAR SEPTUM: 0.75 CM (ref 0.6–1.1)
IVRT: 111.32 MSEC
LEFT ARM DIASTOLIC BLOOD PRESSURE: 59 MMHG
LEFT ARM SYSTOLIC BLOOD PRESSURE: 96 MMHG
LEFT ATRIUM AREA SYSTOLIC (APICAL 4 CHAMBER): 13.17 CM2
LEFT ATRIUM SIZE: 3.11 CM
LEFT CBA DIAS: 16 CM/S
LEFT CBA SYS: 63 CM/S
LEFT CCA DIST DIAS: 27 CM/S
LEFT CCA DIST SYS: 113 CM/S
LEFT CCA MID DIAS: 27 CM/S
LEFT CCA MID SYS: 100 CM/S
LEFT CCA PROX DIAS: 28 CM/S
LEFT CCA PROX SYS: 110 CM/S
LEFT ECA DIAS: 12 CM/S
LEFT ECA SYS: 104 CM/S
LEFT ICA DIST DIAS: 35 CM/S
LEFT ICA DIST SYS: 87 CM/S
LEFT ICA MID DIAS: 39 CM/S
LEFT ICA MID SYS: 99 CM/S
LEFT ICA PROX DIAS: 22 CM/S
LEFT ICA PROX SYS: 83 CM/S
LEFT INTERNAL DIMENSION IN SYSTOLE: 2.8 CM (ref 2.1–4)
LEFT VENTRICLE DIASTOLIC VOLUME INDEX: 52.32 ML/M2
LEFT VENTRICLE DIASTOLIC VOLUME: 84.76 ML
LEFT VENTRICLE END SYSTOLIC VOLUME APICAL 4 CHAMBER: 28.19 ML
LEFT VENTRICLE MASS INDEX: 64 G/M2
LEFT VENTRICLE SYSTOLIC VOLUME INDEX: 18.3 ML/M2
LEFT VENTRICLE SYSTOLIC VOLUME: 29.66 ML
LEFT VENTRICULAR INTERNAL DIMENSION IN DIASTOLE: 4.34 CM (ref 3.5–6)
LEFT VENTRICULAR MASS: 103.46 G
LEFT VERTEBRAL DIAS: 25 CM/S
LEFT VERTEBRAL SYS: 84 CM/S
LV LATERAL E/E' RATIO: 6.17 M/S
LV SEPTAL E/E' RATIO: 7.4 M/S
LVED V (TEICH): 84.76 ML
LVES V (TEICH): 29.66 ML
LVOT MG: 1.71 MMHG
LVOT MV: 0.59 CM/S
MV PEAK A VEL: 0.5 M/S
MV PEAK E VEL: 0.74 M/S
MV STENOSIS PRESSURE HALF TIME: 71.92 MS
MV VALVE AREA P 1/2 METHOD: 3.06 CM2
OHS CV CAROTID RIGHT ICA EDV HIGHEST: 50
OHS CV CAROTID ULTRASOUND LEFT ICA/CCA RATIO: 0.88
OHS CV CAROTID ULTRASOUND RIGHT ICA/CCA RATIO: 1.27
OHS CV CPX 1 MINUTE RECOVERY HEART RATE: 103 BPM
OHS CV CPX 85 PERCENT MAX PREDICTED HEART RATE MALE: 135
OHS CV CPX ESTIMATED METS: 13
OHS CV CPX MAX PREDICTED HEART RATE: 159
OHS CV CPX PATIENT IS FEMALE: 1
OHS CV CPX PATIENT IS MALE: 0
OHS CV CPX PEAK DIASTOLIC BLOOD PRESSURE: 87 MMHG
OHS CV CPX PEAK HEAR RATE: 148 BPM
OHS CV CPX PEAK RATE PRESSURE PRODUCT: NORMAL
OHS CV CPX PEAK SYSTOLIC BLOOD PRESSURE: 191 MMHG
OHS CV CPX PERCENT MAX PREDICTED HEART RATE ACHIEVED: 97
OHS CV CPX RATE PRESSURE PRODUCT PRESENTING: 8640
OHS CV PV CAROTID LEFT HIGHEST CCA: 113
OHS CV PV CAROTID LEFT HIGHEST ICA: 99
OHS CV PV CAROTID RIGHT HIGHEST CCA: 106
OHS CV PV CAROTID RIGHT HIGHEST ICA: 116
OHS CV US CAROTID LEFT HIGHEST EDV: 39
PISA MRMAX VEL: 5.71 M/S
PISA TR MAX VEL: 2.33 M/S
PULM VEIN S/D RATIO: 1.34
PV PEAK D VEL: 0.5 M/S
PV PEAK S VEL: 0.67 M/S
RA PRESSURE ESTIMATED: 8 MMHG
RIGHT ARM DIASTOLIC BLOOD PRESSURE: 59 MMHG
RIGHT ARM SYSTOLIC BLOOD PRESSURE: 96 MMHG
RIGHT CBA DIAS: 17 CM/S
RIGHT CBA SYS: 55 CM/S
RIGHT CCA DIST DIAS: 30 CM/S
RIGHT CCA DIST SYS: 91 CM/S
RIGHT CCA MID DIAS: 31 CM/S
RIGHT CCA MID SYS: 101 CM/S
RIGHT CCA PROX DIAS: 28 CM/S
RIGHT CCA PROX SYS: 106 CM/S
RIGHT ECA DIAS: 16 CM/S
RIGHT ECA SYS: 113 CM/S
RIGHT ICA DIST DIAS: 38 CM/S
RIGHT ICA DIST SYS: 90 CM/S
RIGHT ICA MID DIAS: 50 CM/S
RIGHT ICA MID SYS: 116 CM/S
RIGHT ICA PROX DIAS: 31 CM/S
RIGHT ICA PROX SYS: 110 CM/S
RIGHT VERTEBRAL DIAS: 18 CM/S
RIGHT VERTEBRAL SYS: 63 CM/S
RV TB RVSP: 10 MMHG
RV TISSUE DOPPLER FREE WALL SYSTOLIC VELOCITY 1 (APICAL 4 CHAMBER VIEW): 11.35 CM/S
SINUS: 2.88 CM
STJ: 2.53 CM
STRESS ECHO POST EXERCISE DUR MIN: 7 MINUTES
STRESS ECHO POST EXERCISE DUR SEC: 27 SECONDS
SYSTOLIC BLOOD PRESSURE: 144 MMHG
TDI LATERAL: 0.12 M/S
TDI SEPTAL: 0.1 M/S
TDI: 0.11 M/S
TR MAX PG: 22 MMHG
TRICUSPID ANNULAR PLANE SYSTOLIC EXCURSION: 2.27 CM
TV REST PULMONARY ARTERY PRESSURE: 30 MMHG
Z-SCORE OF LEFT VENTRICULAR DIMENSION IN END DIASTOLE: -0.54
Z-SCORE OF LEFT VENTRICULAR DIMENSION IN END SYSTOLE: -0.12

## 2024-08-12 PROCEDURE — 93351 STRESS TTE COMPLETE: CPT | Mod: 26,,, | Performed by: INTERNAL MEDICINE

## 2024-08-12 PROCEDURE — 93325 DOPPLER ECHO COLOR FLOW MAPG: CPT | Mod: 26,,, | Performed by: INTERNAL MEDICINE

## 2024-08-12 PROCEDURE — 93325 DOPPLER ECHO COLOR FLOW MAPG: CPT | Mod: PO

## 2024-08-12 PROCEDURE — 93880 EXTRACRANIAL BILAT STUDY: CPT | Mod: PO

## 2024-08-12 PROCEDURE — 93320 DOPPLER ECHO COMPLETE: CPT | Mod: 26,,, | Performed by: INTERNAL MEDICINE

## 2024-08-12 PROCEDURE — 93880 EXTRACRANIAL BILAT STUDY: CPT | Mod: 26,,, | Performed by: INTERNAL MEDICINE

## 2024-08-14 ENCOUNTER — OFFICE VISIT (OUTPATIENT)
Dept: FAMILY MEDICINE | Facility: CLINIC | Age: 64
End: 2024-08-14
Payer: COMMERCIAL

## 2024-08-14 VITALS
DIASTOLIC BLOOD PRESSURE: 60 MMHG | RESPIRATION RATE: 18 BRPM | SYSTOLIC BLOOD PRESSURE: 118 MMHG | TEMPERATURE: 98 F | HEIGHT: 67 IN | BODY MASS INDEX: 18.35 KG/M2 | OXYGEN SATURATION: 98 % | HEART RATE: 64 BPM | WEIGHT: 116.88 LBS

## 2024-08-14 DIAGNOSIS — G47.09 OTHER INSOMNIA: ICD-10-CM

## 2024-08-14 DIAGNOSIS — M81.6 LOCALIZED OSTEOPOROSIS WITHOUT CURRENT PATHOLOGICAL FRACTURE: ICD-10-CM

## 2024-08-14 DIAGNOSIS — R20.0 NUMBNESS AND TINGLING IN LEFT HAND: ICD-10-CM

## 2024-08-14 DIAGNOSIS — Z00.01 ANNUAL VISIT FOR GENERAL ADULT MEDICAL EXAMINATION WITH ABNORMAL FINDINGS: Primary | ICD-10-CM

## 2024-08-14 DIAGNOSIS — R20.2 NUMBNESS AND TINGLING IN LEFT HAND: ICD-10-CM

## 2024-08-14 DIAGNOSIS — F41.9 ANXIETY: ICD-10-CM

## 2024-08-14 PROCEDURE — 3078F DIAST BP <80 MM HG: CPT | Mod: CPTII,S$GLB,, | Performed by: FAMILY MEDICINE

## 2024-08-14 PROCEDURE — 1159F MED LIST DOCD IN RCRD: CPT | Mod: CPTII,S$GLB,, | Performed by: FAMILY MEDICINE

## 2024-08-14 PROCEDURE — 99999 PR PBB SHADOW E&M-EST. PATIENT-LVL IV: CPT | Mod: PBBFAC,,, | Performed by: FAMILY MEDICINE

## 2024-08-14 PROCEDURE — 99396 PREV VISIT EST AGE 40-64: CPT | Mod: 25,S$GLB,, | Performed by: FAMILY MEDICINE

## 2024-08-14 PROCEDURE — 3074F SYST BP LT 130 MM HG: CPT | Mod: CPTII,S$GLB,, | Performed by: FAMILY MEDICINE

## 2024-08-14 PROCEDURE — 3044F HG A1C LEVEL LT 7.0%: CPT | Mod: CPTII,S$GLB,, | Performed by: FAMILY MEDICINE

## 2024-08-14 PROCEDURE — 3008F BODY MASS INDEX DOCD: CPT | Mod: CPTII,S$GLB,, | Performed by: FAMILY MEDICINE

## 2024-08-14 PROCEDURE — 90471 IMMUNIZATION ADMIN: CPT | Mod: S$GLB,,, | Performed by: FAMILY MEDICINE

## 2024-08-14 PROCEDURE — 90715 TDAP VACCINE 7 YRS/> IM: CPT | Mod: S$GLB,,, | Performed by: FAMILY MEDICINE

## 2024-08-14 RX ORDER — ALENDRONATE SODIUM 70 MG/1
70 TABLET ORAL
Qty: 12 TABLET | Refills: 3 | Status: SHIPPED | OUTPATIENT
Start: 2024-08-14 | End: 2025-08-14

## 2024-08-14 RX ORDER — ALPRAZOLAM 0.25 MG/1
TABLET ORAL
Qty: 30 TABLET | Refills: 5 | Status: SHIPPED | OUTPATIENT
Start: 2024-08-14

## 2024-08-14 NOTE — PROGRESS NOTES
Assessment:       1. Annual visit for general adult medical examination with abnormal findings    2. Anxiety    3. Localized osteoporosis without current pathological fracture    4. Numbness and tingling in left hand    5. Other insomnia        Assessment & Plan  Annual visit for general adult medical examination with abnormal findings  -     Tdap (BOOSTRIX) vaccine injection 0.5 mL    Anxiety:  Stable  -     ALPRAZolam (XANAX) 0.25 MG tablet; TAKE 1 TABLET(0.25 MG) BY MOUTH EVERY NIGHT AS NEEDED FOR INSOMNIA OR ANXIETY  Dispense: 30 tablet; Refill: 5    Localized osteoporosis without current pathological fracture:  Stable  -     alendronate (FOSAMAX) 70 MG tablet; Take 1 tablet (70 mg total) by mouth every 7 days.  Dispense: 12 tablet; Refill: 3    Numbness and tingling in left hand: New problem workup needed  -     EMG W/ ULTRASOUND AND NERVE CONDUCTION TEST 1 Extremity; Future    Other insomnia: Stable       She has been experiencing difficulty staying asleep and has tried various treatments, including clonazepam, which she discontinued a week ago due to concerns about memory loss. She has resumed taking Xanax 0.25 mg at bedtime for anxiety and sleep disturbances. A prescription for Xanax 0.25 mg, 30 tablets with 5 refills, will be sent to [facility]. She is advised to continue her current exercise regimen and consider non-pharmacological approaches such as meditation.    Her osteoporosis is mild, with a risk factor of 7.6%. Blood work results are satisfactory. A prescription for Fosamax will be provided, to be taken weekly on an empty stomach, followed by a 30-minute period without food or other medications. She is advised to maintain her current exercise regimen, including weight training and cardio. She will receive her Tdap vaccine today. A bone density test will be scheduled for next year. If there is no improvement in her bone density after taking Fosamax, biannual injections will be considered.      She  reports tingling and numbness in her fingers, suggesting carpal tunnel syndrome. An EMG study will be ordered to confirm the diagnosis. She is advised to schedule the EMG study with a neurologist.    Follow-up  The patient will follow up in 6 months.          Patient agreed with assessment and plan. Patient verbalized understanding.     Subjective:       Patient ID: Mely Viera is a 63 y.o. female.    Chief Complaint: Annual Exam    HPI  History of Present Illness  The patient presents for evaluation of multiple medical concerns.    She has been experiencing sleep disturbances and sought help from a sleep specialist. The specialist prescribed clonazepam, which provided some relief but was associated with memory loss. She has been using this medication for over a year. Her daughter noticed signs of forgetfulness, prompting her to discontinue the medication a week ago. She has resumed taking Xanax 0.25 mg at bedtime, a medication previously prescribed for her anxiety. Without medication, she struggles to sleep, often only managing 2 hours per night. She has tried various treatments, including trazodone, sleeping machines, and meditation, but none have been effective. Her main issue is maintaining sleep throughout the night. She often wakes up at 3:00 AM and finds it difficult to fall back asleep. Despite her sleep issues, she does not nap during the day. She also takes magnesium glycinate at night.    She recently underwent a bone density test which revealed osteoporosis. She maintains an active lifestyle, lifting weights twice a week and doing cardio three times a week. She also takes vitamin D supplements in the morning. She recalls that during her pregnancy, she was not given extra calcium, which led to complications due to calcium deficiency. She has been unable to regain the weight she lost following a heart issue three years ago. She tries to maintain a healthy diet, but occasionally indulges in fried fish.  She has managed to bring her cholesterol levels back to normal.    She has been experiencing severe pain in her finger, described as if a pin had pierced through it. This sensation then moved to another finger, causing a tingling sensation. She believes this is only occurring in her right hand.    She has anxiety and panic attacks. She does not have a history of stomach ulcers. She gets migraines and headaches a lot more now. When she gets her migraines, she takes BC powders which helps her. She tries not to take those too often because those can cause ulcers. She is scheduled to see her cardiologist in 10/2024. She has to wear a heart monitor for 30 days, so they had her scheduled for her mammogram next week, so she had to push it back because she can not do a mammogram with a heart monitor on.     Past medical history, past social history was reviewed and discussed with the patient.    Review of Systems    Objective:      Physical Exam  Vitals and nursing note reviewed.   Constitutional:       General: She is not in acute distress.     Appearance: Normal appearance. She is well-developed. She is not diaphoretic.   HENT:      Head: Normocephalic and atraumatic.      Right Ear: External ear normal.      Left Ear: External ear normal.      Nose: Nose normal.      Mouth/Throat:      Pharynx: No oropharyngeal exudate.   Eyes:      General: No scleral icterus.        Right eye: No discharge.         Left eye: No discharge.      Conjunctiva/sclera: Conjunctivae normal.      Pupils: Pupils are equal, round, and reactive to light.   Cardiovascular:      Rate and Rhythm: Normal rate and regular rhythm.      Heart sounds: Normal heart sounds.   Pulmonary:      Effort: Pulmonary effort is normal. No respiratory distress.      Breath sounds: Normal breath sounds. No wheezing.   Abdominal:      General: Abdomen is flat. Bowel sounds are normal. There is no distension.      Tenderness: There is no abdominal tenderness.    Musculoskeletal:         General: No tenderness or deformity.      Cervical back: Neck supple.   Skin:     Coloration: Skin is not pale.   Neurological:      Mental Status: She is alert.   Psychiatric:         Behavior: Behavior normal.         Thought Content: Thought content normal.         Judgment: Judgment normal.         Physical Exam       Results  Laboratory Studies  Magnesium levels were good. Blood work was normal.    Imaging  Bone density test showed osteoporosis.     This note was generated with the assistance of ambient listening technology. Verbal consent was obtained by the patient and accompanying visitor(s) for the recording of patient appointment to facilitate this note. I attest to having reviewed and edited the generated note for accuracy, though some syntax or spelling errors may persist. Please contact the author of this note for any clarification.

## 2024-08-16 ENCOUNTER — TELEPHONE (OUTPATIENT)
Dept: NEUROLOGY | Facility: CLINIC | Age: 64
End: 2024-08-16
Payer: COMMERCIAL

## 2024-08-16 NOTE — TELEPHONE ENCOUNTER
----- Message from Aravind Ruiz sent at 8/16/2024  1:44 PM CDT -----  Regarding: reschedule EMG  Contact: papa at [phone]  Type: Needs Medical Advice    Who Called:  Papa George Call Back Number: [phone]    Additional Information: pt was given EMG for 10/1/24. Pt cannot do TUE or THUR. Please call to reschedule EMG to MON, WED or FRI.

## 2024-08-20 ENCOUNTER — HOSPITAL ENCOUNTER (OUTPATIENT)
Dept: CARDIOLOGY | Facility: HOSPITAL | Age: 64
Discharge: HOME | End: 2024-08-20
Attending: INTERNAL MEDICINE
Payer: COMMERCIAL

## 2024-08-20 DIAGNOSIS — I47.29 NSVT (NONSUSTAINED VENTRICULAR TACHYCARDIA): Chronic | ICD-10-CM

## 2024-08-20 PROCEDURE — 93271 ECG/MONITORING AND ANALYSIS: CPT | Mod: PO

## 2024-09-23 ENCOUNTER — HOSPITAL ENCOUNTER (OUTPATIENT)
Dept: RADIOLOGY | Facility: HOSPITAL | Age: 64
Discharge: HOME | End: 2024-09-23
Attending: OBSTETRICS & GYNECOLOGY
Payer: COMMERCIAL

## 2024-09-23 DIAGNOSIS — Z12.31 ENCOUNTER FOR SCREENING MAMMOGRAM FOR MALIGNANT NEOPLASM OF BREAST: ICD-10-CM

## 2024-09-23 PROCEDURE — 77067 SCR MAMMO BI INCL CAD: CPT | Mod: TC,PO

## 2024-09-23 PROCEDURE — 77067 SCR MAMMO BI INCL CAD: CPT | Mod: 26,,, | Performed by: RADIOLOGY

## 2024-09-23 PROCEDURE — 77063 BREAST TOMOSYNTHESIS BI: CPT | Mod: 26,,, | Performed by: RADIOLOGY

## 2024-10-15 ENCOUNTER — TELEPHONE (OUTPATIENT)
Dept: FAMILY MEDICINE | Facility: CLINIC | Age: 64
End: 2024-10-15
Payer: COMMERCIAL

## 2024-10-15 NOTE — TELEPHONE ENCOUNTER
----- Message from Ally sent at 10/15/2024 10:28 AM CDT -----  Type: Patient Call Back    Who called: self     What is the request in detail: pt was send at the ED on Friday, is requesting to have her results looked over. Please call    Can the clinic reply by [facility]? No    Would the patient rather a call back or a response via [facility]?  call    Best call back number: .[phone] (home)      Additional Information:

## 2024-10-16 ENCOUNTER — OFFICE VISIT (OUTPATIENT)
Dept: FAMILY MEDICINE | Facility: CLINIC | Age: 64
End: 2024-10-16
Payer: COMMERCIAL

## 2024-10-16 ENCOUNTER — LAB VISIT (OUTPATIENT)
Dept: LAB | Facility: HOSPITAL | Age: 64
End: 2024-10-16
Attending: FAMILY MEDICINE
Payer: COMMERCIAL

## 2024-10-16 VITALS
SYSTOLIC BLOOD PRESSURE: 118 MMHG | OXYGEN SATURATION: 98 % | HEART RATE: 75 BPM | WEIGHT: 115.94 LBS | DIASTOLIC BLOOD PRESSURE: 68 MMHG | BODY MASS INDEX: 18.16 KG/M2

## 2024-10-16 DIAGNOSIS — R42 LIGHTHEADED: ICD-10-CM

## 2024-10-16 DIAGNOSIS — E87.6 HYPOKALEMIA: ICD-10-CM

## 2024-10-16 DIAGNOSIS — Z09 HOSPITAL DISCHARGE FOLLOW-UP: ICD-10-CM

## 2024-10-16 DIAGNOSIS — E87.6 HYPOKALEMIA: Primary | ICD-10-CM

## 2024-10-16 LAB
ALBUMIN SERPL BCP-MCNC: 4.3 G/DL (ref 3.5–5.2)
ALP SERPL-CCNC: 83 U/L (ref 55–135)
ALT SERPL W/O P-5'-P-CCNC: 17 U/L (ref 10–44)
ANION GAP SERPL CALC-SCNC: 11 MMOL/L (ref 8–16)
AST SERPL-CCNC: 19 U/L (ref 10–40)
BILIRUB SERPL-MCNC: 0.5 MG/DL (ref 0.1–1)
BUN SERPL-MCNC: 19 MG/DL (ref 8–23)
CALCIUM SERPL-MCNC: 10.1 MG/DL (ref 8.7–10.5)
CHLORIDE SERPL-SCNC: 103 MMOL/L (ref 95–110)
CO2 SERPL-SCNC: 27 MMOL/L (ref 23–29)
CREAT SERPL-MCNC: 0.9 MG/DL (ref 0.5–1.4)
EST. GFR  (NO RACE VARIABLE): >60 ML/MIN/1.73 M^2
GLUCOSE SERPL-MCNC: 92 MG/DL (ref 70–110)
POTASSIUM SERPL-SCNC: 4.9 MMOL/L (ref 3.5–5.1)
PROT SERPL-MCNC: 8 G/DL (ref 6–8.4)
SODIUM SERPL-SCNC: 141 MMOL/L (ref 136–145)

## 2024-10-16 PROCEDURE — 3078F DIAST BP <80 MM HG: CPT | Mod: CPTII,S$GLB,,

## 2024-10-16 PROCEDURE — 1159F MED LIST DOCD IN RCRD: CPT | Mod: CPTII,S$GLB,,

## 2024-10-16 PROCEDURE — 3044F HG A1C LEVEL LT 7.0%: CPT | Mod: CPTII,S$GLB,,

## 2024-10-16 PROCEDURE — 99999 PR PBB SHADOW E&M-EST. PATIENT-LVL III: CPT | Mod: PBBFAC,,,

## 2024-10-16 PROCEDURE — 3074F SYST BP LT 130 MM HG: CPT | Mod: CPTII,S$GLB,,

## 2024-10-16 PROCEDURE — 80053 COMPREHEN METABOLIC PANEL: CPT

## 2024-10-16 PROCEDURE — 99214 OFFICE O/P EST MOD 30 MIN: CPT | Mod: S$GLB,,,

## 2024-10-16 PROCEDURE — 3008F BODY MASS INDEX DOCD: CPT | Mod: CPTII,S$GLB,,

## 2024-10-16 PROCEDURE — 36415 COLL VENOUS BLD VENIPUNCTURE: CPT | Mod: PO

## 2024-10-16 NOTE — PROGRESS NOTES
Name: Mely Viera  MRN: [id number]  : 1960  PCP: Eilsha Street MD    HPI    Patient follows with Dr. Street, new to me. She presents for ED follow up for N/V/D. She reports episodes of dizziness/lightheadedness. She reports she just dropped Zofran prescription off at pharmacy but we begin to use the medication. She complains mostly of nausea without any emesis. She reports feeling nausea and lightheaded for 6 days now. She reports her stools are dark. CBC done in the ED was normal. She reports drinking water and electrolyte drinks since leaving the hospital. She reports dizziness feels as though the room is spinning.     Review of Systems   Cardiovascular:  Negative for chest pain and palpitations.   Gastrointestinal:  Positive for nausea. Negative for abdominal pain, constipation, diarrhea and vomiting.       Patient Active Problem List   Diagnosis    Anxiety    Sleep disturbance    Syncope and collapse    Palpitations    Lightheaded    NSVT (nonsustained ventricular tachycardia)    Tricuspid valve insufficiency    Near syncope    Family history of ASCVD       Vitals:    10/16/24 1445   BP: 118/68   Pulse: 75       Physical Exam  Constitutional:       General: She is not in acute distress.     Appearance: She is well-developed.   HENT:      Head: Normocephalic and atraumatic.      Right Ear: External ear normal.      Left Ear: External ear normal.   Eyes:      Conjunctiva/sclera: Conjunctivae normal.      Pupils: Pupils are equal, round, and reactive to light.   Neck:      Thyroid: No thyromegaly.   Cardiovascular:      Rate and Rhythm: Normal rate and regular rhythm.      Pulses: Normal pulses.      Heart sounds: Normal heart sounds, S1 normal and S2 normal.   Pulmonary:      Effort: Pulmonary effort is normal. No respiratory distress.      Breath sounds: Normal breath sounds.   Chest:      Chest wall: No tenderness.   Musculoskeletal:         General: No swelling or tenderness. Normal range of motion.       Cervical back: Normal range of motion and neck supple.   Skin:     General: Skin is warm and dry.      Coloration: Skin is not jaundiced or pale.   Neurological:      General: No focal deficit present.      Mental Status: She is alert and oriented to person, place, and time.      Cranial Nerves: No cranial nerve deficit.   Psychiatric:         Mood and Affect: Mood normal.         Behavior: Behavior normal.         1. Hypokalemia  -     COMPREHENSIVE METABOLIC PANEL; Future; Expected date: 10/16/2024    2. Hospital discharge follow-up  Mildly improved. Complains of continued nausea without emesis and dizziness. Will recheck labs as she was hypokalemic in ED.     3. Lightheaded  Suspect lightheadness/dizziness secondary to vertigo. Vestibular exercises provided to patient. Consider ENT referral if no improvement. Encouraged to stay hydrated. Recently had extensive cardiology work up including holter monitor and stress echo performed. Follow up with cardiology next week.       Follow up as scheduled       NICHOLE Lester  10/16/2024

## 2024-10-24 ENCOUNTER — OFFICE VISIT (OUTPATIENT)
Dept: CARDIOLOGY | Facility: CLINIC | Age: 64
End: 2024-10-24
Payer: COMMERCIAL

## 2024-10-24 VITALS
SYSTOLIC BLOOD PRESSURE: 106 MMHG | HEIGHT: 67 IN | DIASTOLIC BLOOD PRESSURE: 62 MMHG | BODY MASS INDEX: 18.75 KG/M2 | HEART RATE: 67 BPM | WEIGHT: 119.5 LBS

## 2024-10-24 DIAGNOSIS — I65.21 ATHEROSCLEROSIS OF RIGHT CAROTID ARTERY: ICD-10-CM

## 2024-10-24 DIAGNOSIS — I34.0 NONRHEUMATIC MITRAL VALVE REGURGITATION: Primary | ICD-10-CM

## 2024-10-24 DIAGNOSIS — I49.49 PREMATURE BEATS: ICD-10-CM

## 2024-10-24 DIAGNOSIS — R00.2 PALPITATIONS: Chronic | ICD-10-CM

## 2024-10-24 PROCEDURE — 99999 PR PBB SHADOW E&M-EST. PATIENT-LVL III: CPT | Mod: PBBFAC,,, | Performed by: INTERNAL MEDICINE

## 2024-10-24 PROCEDURE — 3044F HG A1C LEVEL LT 7.0%: CPT | Mod: CPTII,S$GLB,, | Performed by: INTERNAL MEDICINE

## 2024-10-24 PROCEDURE — 3074F SYST BP LT 130 MM HG: CPT | Mod: CPTII,S$GLB,, | Performed by: INTERNAL MEDICINE

## 2024-10-24 PROCEDURE — 99214 OFFICE O/P EST MOD 30 MIN: CPT | Mod: S$GLB,,, | Performed by: INTERNAL MEDICINE

## 2024-10-24 PROCEDURE — 3008F BODY MASS INDEX DOCD: CPT | Mod: CPTII,S$GLB,, | Performed by: INTERNAL MEDICINE

## 2024-10-24 PROCEDURE — 3078F DIAST BP <80 MM HG: CPT | Mod: CPTII,S$GLB,, | Performed by: INTERNAL MEDICINE

## 2024-10-24 PROCEDURE — 1159F MED LIST DOCD IN RCRD: CPT | Mod: CPTII,S$GLB,, | Performed by: INTERNAL MEDICINE

## 2024-10-24 RX ORDER — METOPROLOL SUCCINATE 25 MG/1
12.5 TABLET, EXTENDED RELEASE ORAL DAILY
Qty: 45 TABLET | Refills: 1 | Status: SHIPPED | OUTPATIENT
Start: 2024-10-24 | End: 2025-10-24

## 2024-10-24 NOTE — PROGRESS NOTES
Subjective:    Patient ID:  Mely Viera is a 64 y.o. female who presents for Follow-up and Heart Problem        HPI  DISCUSSED TESTS CAROTID ULTRASOUND WITH MILD HETEROGENEOUS PLAQUE IN THE RIGHT SIDE,, NEGATIVE STRESS ECHO, MILD MR, EVENT MONITOR 1 SHORT RUN OF PSVT 5 BEATS, S/P ER WITH N/V, BETTER, SEE ROS   Left Ventricle: The left ventricle is normal in size. Normal wall thickness. There is normal systolic function. Ejection fraction by visual approximation is 65 -70%.  There is normal diastolic function.    Right Ventricle: Normal right ventricular cavity size. Systolic function is normal.    Mitral Valve: There is mild MR regurgitation with a posterolateral eccentriccally directed jet.    Tricuspid Valve: There is mild regurgitation.    Pulmonary Artery: The estimated pulmonary artery systolic pressure is 30 mmHg.    IVC/SVC: Intermediate venous pressure at 8 mmHg.    Stress Protocol: The patient exercised for 7 minutes 27 seconds on a high ramp protocol, corresponding to a functional capacity of 13METS, achieving a peak heart rate of 148 bpm, which is 97% of the age predicted maximum heart rate. Their exercise capacity was normal. The test was stopped because the patient experienced fatigue.    Stress ECG: There is of mild upward-sloping ST segment depression in the inferolateral leads (II, III, aVF, V5 and V6) noted during stress.  Baseline artifact noted There are no arrhythmias during stress. There is normal blood pressure response with stress.    ECG Conclusion: The ECG portion of the study is nondiagnostic for ischemia.    Post-stress Impression: The study is negative with no echocardiographic evidence of stress induced ischemia.     Past Medical History:   Diagnosis Date    Anxiety     Chronic insomnia     >40 years    Depression     Heat stroke     HSV (herpes simplex virus) infection      Past Surgical History:   Procedure Laterality Date    COLONOSCOPY N/A 12/6/2022    Procedure: COLONOSCOPY;   Surgeon: Damien Johnson MD;  Location: [facility] ENDO;  Service: Endoscopy;  Laterality: N/A;    ESOPHAGOGASTRODUODENOSCOPY N/A 12/6/2022    Procedure: EGD (ESOPHAGOGASTRODUODENOSCOPY);  Surgeon: Damien Johnson MD;  Location: [facility] ENDO;  Service: Endoscopy;  Laterality: N/A;    NONE      TONSILLECTOMY  1984     Family History   Problem Relation Name Age of Onset    Bladder Cancer Father      Diabetes Sister      Hypertension Sister      Crohn's disease Sister      Breast cancer Maternal Aunt  49    Ovarian cancer Paternal Cousin 1st 65    COPD Neg Hx      Stomach cancer Neg Hx      Esophageal cancer Neg Hx      Ulcerative colitis Neg Hx      Celiac disease Neg Hx       Social History     Socioeconomic History    Marital status: Single    Number of children: 1   Occupational History    Occupation: marketing     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   Tobacco Use    Smoking status: Never    Smokeless tobacco: Never   Substance and Sexual Activity    Alcohol use: Not Currently     Alcohol/week: 0.0 - 2.0 standard drinks of alcohol     Comment: less than once a week    Drug use: No    Sexual activity: Not Currently     Partners: Male     Birth control/protection: None, Post-menopausal     Social Drivers of Health     Financial Resource Strain: Low Risk  (7/25/2024)    Overall Financial Resource Strain (CARDIA)     Difficulty of Paying Living Expenses: Not hard at all   Food Insecurity: No Food Insecurity (7/25/2024)    Hunger Vital Sign     Worried About Running Out of Food in the Last Year: Never true     Ran Out of Food in the Last Year: Never true   Transportation Needs: No Transportation Needs (10/23/2023)    PRAPARE - Transportation     Lack of Transportation (Medical): No     Lack of Transportation (Non-Medical): No   Physical Activity: Sufficiently Active (7/25/2024)    Exercise Vital Sign     Days of Exercise per Week: 3 days     Minutes of Exercise per Session: 60 min   Stress: Stress Concern Present  (7/25/2024)    [employer] - Occupational Stress Questionnaire     Feeling of Stress : Very much   Housing Stability: Low Risk  (10/23/2023)    Housing Stability Vital Sign     Unable to Pay for Housing in the Last Year: No     Number of Places Lived in the Last Year: 1     Unstable Housing in the Last Year: No       Review of patient's allergies indicates:   Allergen Reactions    Shrimp Swelling     hives    Temazepam Swelling and Rash    Egg      Other reaction(s): Hives    Escitalopram      Other reaction(s): did not like effects       Current Outpatient Medications:     alendronate (FOSAMAX) 70 MG tablet, Take 1 tablet (70 mg total) by mouth every 7 days., Disp: 12 tablet, Rfl: 3    ALPRAZolam (XANAX) 0.25 MG tablet, TAKE 1 TABLET(0.25 MG) BY MOUTH EVERY NIGHT AS NEEDED FOR INSOMNIA OR ANXIETY, Disp: 30 tablet, Rfl: 5    valACYclovir (VALTREX) 500 MG tablet, Take 1 tablet (500 mg total) by mouth once daily., Disp: 90 tablet, Rfl: 3    metoprolol succinate (TOPROL-XL) 25 MG 24 hr tablet, Take 0.5 tablets (12.5 mg total) by mouth once daily., Disp: 45 tablet, Rfl: 1    Review of Systems   Constitutional: Negative for chills, decreased appetite, diaphoresis, fever and malaise/fatigue.   HENT:  Negative for congestion and nosebleeds.    Eyes:  Negative for blurred vision and visual disturbance.   Cardiovascular:  Positive for dyspnea on exertion (MILD) and palpitations (OCC). Negative for chest pain, claudication, cyanosis, irregular heartbeat, near-syncope, orthopnea, paroxysmal nocturnal dyspnea and syncope.   Respiratory:  Negative for cough, hemoptysis, shortness of breath and wheezing.    Endocrine: Negative for polyphagia and polyuria.   Hematologic/Lymphatic: Negative for adenopathy. Does not bruise/bleed easily.   Skin:  Negative for color change and rash.   Musculoskeletal:  Negative for back pain and falls.   Gastrointestinal:  Negative for abdominal pain, dysphagia, jaundice,  "melena and nausea.   Genitourinary:  Negative for dysuria and flank pain.   Neurological:  Negative for brief paralysis, dizziness, loss of balance and numbness.   Psychiatric/Behavioral:  Negative for altered mental status and depression.    Allergic/Immunologic: Negative for hives and persistent infections.        Objective:      Vitals:    10/24/24 1526   BP: 106/62   Pulse: 67   Weight: 54.2 kg (119 lb 7.8 oz)   Height: 5' 7" (1.702 m)   PainSc: 0-No pain     Body mass index is 18.71 kg/m².    Physical Exam  Constitutional:       Appearance: Normal appearance. She is underweight.   Neck:      Vascular: No carotid bruit or JVD.   Cardiovascular:      Rate and Rhythm: Normal rate and regular rhythm. No extrasystoles are present.     Pulses:           Carotid pulses are 2+ on the right side and 2+ on the left side.       Radial pulses are 2+ on the right side and 2+ on the left side.        Posterior tibial pulses are 2+ on the right side and 2+ on the left side.      Heart sounds: Murmur heard.      Systolic murmur is present with a grade of 1/6 at the lower left sternal border.      No friction rub. No gallop.   Pulmonary:      Effort: Pulmonary effort is normal. No respiratory distress.      Breath sounds: Normal breath sounds and air entry.   Musculoskeletal:      Cervical back: Neck supple.      Right lower leg: No edema.      Left lower leg: No edema.   Skin:     Capillary Refill: Capillary refill takes less than 2 seconds.   Neurological:      General: No focal deficit present.      Mental Status: She is alert and oriented to person, place, and time.      Cranial Nerves: No cranial nerve deficit.   Psychiatric:         Mood and Affect: Mood normal.         Speech: Speech normal.         Behavior: Behavior normal.                 ..    Chemistry        Component Value Date/Time     10/16/2024 1519    K 4.9 10/16/2024 1519     10/16/2024 1519    CO2 27 10/16/2024 1519    BUN 19 10/16/2024 1519    " CREATININE 0.9 10/16/2024 1519    GLU 92 10/16/2024 1519        Component Value Date/Time    CALCIUM 10.1 10/16/2024 1519    ALKPHOS 83 10/16/2024 1519    AST 19 10/16/2024 1519    ALT 17 10/16/2024 1519    BILITOT 0.5 10/16/2024 1519    ESTGFRAFRICA >60.0 11/11/2020 0742    EGFRNONAA >60.0 11/11/2020 0742            ..  Lab Results   Component Value Date    CHOL 160 08/16/2023    CHOL 179 08/16/2022    CHOL 187 11/11/2020     Lab Results   Component Value Date    HDL 43 08/16/2023    HDL 42 08/16/2022    HDL 44 11/11/2020     Lab Results   Component Value Date    LDLCALC 94.0 08/16/2023    LDLCALC 105.8 08/16/2022    LDLCALC 109.0 11/11/2020     Lab Results   Component Value Date    TRIG 115 08/16/2023    TRIG 156 (H) 08/16/2022    TRIG 170 (H) 11/11/2020     Lab Results   Component Value Date    CHOLHDL 26.9 08/16/2023    CHOLHDL 23.5 08/16/2022    CHOLHDL 23.5 11/11/2020     ..  Lab Results   Component Value Date    WBC 7.88 10/11/2024    HGB 13.5 10/11/2024    HCT 39.8 10/11/2024    MCV 92 10/11/2024     10/11/2024       Test(s) Reviewed  I have reviewed the following in detail:  [x] Stress test   [] Angiography   [x] Echocardiogram   [x] Labs   [x] Other:       Assessment:         ICD-10-CM ICD-9-CM   1. Nonrheumatic mitral valve regurgitation  I34.0 424.0   2. Premature beats  I49.49 427.60   3. Atherosclerosis of right carotid artery  I65.21 433.10   4. Palpitations  R00.2 785.1     Problem List Items Addressed This Visit          Cardiac/Vascular    Palpitations    Nonrheumatic mitral valve regurgitation - Primary    Premature beats    Atherosclerosis of right carotid artery    Relevant Orders    Lipid Panel        Plan:         PRN METOPROLOL, ALL CV CLINICALLY STABLE, NO ANGINA, NO HF, NO TIA, BENIGN CLINICAL ARRHYTHMIA,CONTINUE CURRENT MEDS, EDUCATION, DIET, EXERCISE, STAY WELL HYDRATED RETURN TO CLINIC IN 8 MO WITH LIPIDS  Nonrheumatic mitral valve regurgitation    Premature  beats    Atherosclerosis of right carotid artery  -     Lipid Panel; Future; Expected date: 04/24/2025    Palpitations    Other orders  -     metoprolol succinate (TOPROL-XL) 25 MG 24 hr tablet; Take 0.5 tablets (12.5 mg total) by mouth once daily.  Dispense: 45 tablet; Refill: 1    RTC Low level/low impact aerobic exercise 5x's/wk. Heart healthy diet and risk factor modification.    See labs and med orders.    Aerobic exercise 5x's/wk. Heart healthy diet and risk factor modification.    See labs and med orders.      ALL CV CLINICALLY STABLE, NO ANGINA, NO HF, NO TIA, NO CLINICAL ARRHYTHMIA,CONTINUE CURRENT MEDS, EDUCATION, DIET, EXERCISE

## 2024-11-07 ENCOUNTER — TELEPHONE (OUTPATIENT)
Dept: NEUROLOGY | Facility: CLINIC | Age: 64
End: 2024-11-07

## 2024-11-07 DIAGNOSIS — R20.0 NUMBNESS AND TINGLING IN LEFT HAND: Primary | ICD-10-CM

## 2024-11-07 DIAGNOSIS — R20.2 NUMBNESS AND TINGLING IN LEFT HAND: Primary | ICD-10-CM

## 2024-11-11 ENCOUNTER — PROCEDURE VISIT (OUTPATIENT)
Dept: NEUROLOGY | Facility: CLINIC | Age: 64
End: 2024-11-11
Payer: COMMERCIAL

## 2024-11-11 DIAGNOSIS — R20.0 NUMBNESS AND TINGLING IN LEFT HAND: ICD-10-CM

## 2024-11-11 DIAGNOSIS — R20.2 NUMBNESS AND TINGLING IN LEFT HAND: ICD-10-CM

## 2024-11-11 PROCEDURE — 95886 MUSC TEST DONE W/N TEST COMP: CPT | Mod: S$GLB,,, | Performed by: PSYCHIATRY & NEUROLOGY

## 2024-11-11 PROCEDURE — 95909 NRV CNDJ TST 5-6 STUDIES: CPT | Mod: S$GLB,,, | Performed by: PSYCHIATRY & NEUROLOGY

## 2024-11-26 ENCOUNTER — OFFICE VISIT (OUTPATIENT)
Dept: SURGERY | Facility: CLINIC | Age: 64
End: 2024-11-26
Payer: COMMERCIAL

## 2024-11-26 VITALS
DIASTOLIC BLOOD PRESSURE: 70 MMHG | HEART RATE: 76 BPM | OXYGEN SATURATION: 99 % | RESPIRATION RATE: 16 BRPM | WEIGHT: 117.75 LBS | HEIGHT: 67 IN | BODY MASS INDEX: 18.48 KG/M2 | SYSTOLIC BLOOD PRESSURE: 114 MMHG | TEMPERATURE: 98 F

## 2024-11-26 DIAGNOSIS — K64.4 ANAL SKIN TAG: ICD-10-CM

## 2024-11-26 DIAGNOSIS — R15.0 INCOMPLETE DEFECATION: Primary | ICD-10-CM

## 2024-11-26 PROCEDURE — 99999 PR PBB SHADOW E&M-EST. PATIENT-LVL III: CPT | Mod: PBBFAC,,, | Performed by: COLON & RECTAL SURGERY

## 2024-11-26 NOTE — PROGRESS NOTES
HPI:  Mely Viera is a 64 y.o. female with history of hemorrhoids   Patient seen today for hemorrhoids. She complains of intermittent swelling and discomfort. She manual reduces what she thinks of our hemorrhoids. She, however, does not experience the sensation of prolapse. Occasionally, shell see spotting a blood on the toilet paper. In general, she moves her bowels every day. They are soft on occasion and she has   difficulty evacuating and she has problems cleaning afterwards. She had a colonoscopy one year ago.    Past Medical History:   Diagnosis Date    Anxiety     Chronic insomnia     >40 years    Depression     Heat stroke     HSV (herpes simplex virus) infection         Past Surgical History:   Procedure Laterality Date    COLONOSCOPY N/A 12/6/2022    Procedure: COLONOSCOPY;  Surgeon: Damien Johnson MD;  Location: [facility];  Service: Endoscopy;  Laterality: N/A;    ESOPHAGOGASTRODUODENOSCOPY N/A 12/6/2022    Procedure: EGD (ESOPHAGOGASTRODUODENOSCOPY);  Surgeon: Damien Johnson MD;  Location: [facility];  Service: Endoscopy;  Laterality: N/A;    NONE      TONSILLECTOMY  1984       Review of patient's allergies indicates:   Allergen Reactions    Shrimp Swelling     hives    Temazepam Swelling and Rash    Egg      Other reaction(s): Hives    Escitalopram      Other reaction(s): did not like effects       Family History   Problem Relation Name Age of Onset    Bladder Cancer Father      Diabetes Sister      Hypertension Sister      Crohn's disease Sister      Breast cancer Maternal Aunt  49    Ovarian cancer Paternal Cousin 1st 65    COPD Neg Hx      Stomach cancer Neg Hx      Esophageal cancer Neg Hx      Ulcerative colitis Neg Hx      Celiac disease Neg Hx         Social History     Socioeconomic History    Marital status: Single    Number of children: 1   Occupational History    Occupation: marketing     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   Tobacco Use    Smoking status: Never     Passive  exposure: Past    Smokeless tobacco: Never   Substance and Sexual Activity    Alcohol use: Not Currently     Alcohol/week: 0.0 - 2.0 standard drinks of alcohol     Comment: less than once a week    Drug use: No    Sexual activity: Not Currently     Partners: Male     Birth control/protection: None, Post-menopausal     Social Drivers of Health     Financial Resource Strain: Low Risk  (7/25/2024)    Overall Financial Resource Strain (CARDIA)     Difficulty of Paying Living Expenses: Not hard at all   Food Insecurity: No Food Insecurity (7/25/2024)    Hunger Vital Sign     Worried About Running Out of Food in the Last Year: Never true     Ran Out of Food in the Last Year: Never true   Transportation Needs: No Transportation Needs (10/23/2023)    PRAPARE - Transportation     Lack of Transportation (Medical): No     Lack of Transportation (Non-Medical): No   Physical Activity: Sufficiently Active (7/25/2024)    Exercise Vital Sign     Days of Exercise per Week: 3 days     Minutes of Exercise per Session: 60 min   Stress: Stress Concern Present (7/25/2024)    Citizen of [place] of Occup[employer] - Occupational Stress Questionnaire     Feeling of Stress : Very much   Housing Stability: Low Risk  (10/23/2023)    Housing Stability Vital Sign     Unable to Pay for Housing in the Last Year: No     Number of Places Lived in the Last Year: 1     Unstable Housing in the Last Year: No       ROS:  GENERAL: No fever, chills, fatigability or weight loss.  Integument: No rashes, redness, icterus  CHEST: Denies BAGLEY, cyanosis, wheezing, cough and sputum production.  CARDIOVASCULAR: Denies chest pain, PND, orthopnea or reduced exercise tolerance.  GI: Denies abd pain, dysphagia, nausea, vomiting, no hematemesis   : Denies burning on urination, no hematuria, no bacteriuria  MSK: No deformities, swelling, joint pain swelling  Neurologic: No HAs, seizures, weakness, paresthesias, gait problems    PE:  General appearance   /70  "(BP Location: Left arm, Patient Position: Sitting)   Pulse 76   Temp 97.8 °F (36.6 °C) (Temporal)   Resp 16   Ht 5' 7" (1.702 m)   Wt 53.4 kg (117 lb 11.6 oz)   LMP 08/11/2014   SpO2 99%   BMI 18.44 kg/m²   Sclera/ Skin anicteric  AT NC EOMI  Neck supple trachea midline   Chest symmetric, nl excursion, no retractions, breathing comfortably  EXT - no CCE  Neuro:  Mood/ affect nl, alert and oriented x 3, moves all ext's, gait nl    Rectal  Inspection     Skin tag.  No acute swelling.  No fluctuance or tenderness  MILLIE nl tone, no mass, good squeeze, residual stool in rectal vault      Assessment:  Incomplete defecation  Intermittent anal pain, swelling - possible resolving thrombosed hemorrhoid - much better than 2 weeks ago    Plan:  High fiber diet - 25 grams per day.  Emphasis on whole grain breads such as double fiber wheat bread and high fiber cereals and bars.    Drink 8 glasses of water per day 64 - 96 oz per day  Fiber supplements such as KONSYL, METAMUCIL can be taken 1-2 x per day  Regular exercise - 30 min brisk walking 5 days per week  RTC prn        Anoscopy Procedure Note:   Verbal consent obtained    Indications:  hemorrhoids    Post procedure diagnosis:  skin tags without complication, residual stool in rectal vault.      Procedure: anoscopy    Surgeon BRENDAN    Assistant: Kofi    Specimen none     Findings:      Soft residual stool in vault    Right posterior hemorrhoid grade 1  Right anterior hemorrhoid grade 1  Left lateral hemorrhoid grade 2    Pt tolerated procedure well.      No complications.    "

## 2024-11-26 NOTE — PROCEDURES
[facility]  [address]  [place] [address]  [phone]      Full Name: Mely Viera Gender: Female  Patient ID: [id number] YOB: 1960      Visit Date: 11/11/2024 12:58 PM  Age: 64 Years  Examining Physician: Tara Howell D.O., ABPN, AOBNP, ABEM   Referring Physician: Elisha Street MD  Technologist: TAQUERIA Kumar   Height: 5 feet 7 inch  History: Patint complaining of left hand pins sticking pain of the last three digits with tingling.  Neck pain and stiffness.  This started a few months ago.         Sensory NCS      Nerve / Sites Rec. Site Onset Lat Peak Lat NP Amp Segments Distance Peak Diff Velocity Temp.     ms ms µV  cm ms m/s °C   L Median - Digit II (Antidromic)      Wrist Dig II 2.48 3.21 38.8 Wrist - Dig II 13  52 35.3      Ref.   <=3.80 >=10.0 Ref.   >=50    L Median, Ulnar - Transcarpal comparison      Median Palm Wrist 1.38 1.96 38.0 Median Palm - Wrist 8  58 35.3      Ref.   <=2.20  Ref.          Ulnar Palm Wrist 1.31 1.83 41.7 Ulnar Palm - Wrist 8  61 35.3      Ref.   <=2.20  Ref.            Median Palm - Ulnar Palm  0.12  35.3        Ref.  <=0.40     L Ulnar - Digit V (Antidromic)      Wrist Dig V 2.02 2.83 42.4 Wrist - Dig V 11  54 35.3      Ref.   <=3.20 >=10.0 Ref.   >=50        Motor NCS      Nerve / Sites Muscle Latency Ref. Amplitude Ref. Amp % Duration Segments Distance Lat Diff Ref. Velocity Ref. Temp.     ms ms mV mV % ms  cm ms ms m/s m/s °C   L Median - APB      Wrist APB 3.13 <=4.00 9.3 >=5.0 100 6.00 Wrist - APB      35.3      Elbow APB 6.48  8.5  91.8 6.79 Elbow - Wrist 20.5 3.35  61 >=50 35.3   L Ulnar - ADM      Wrist ADM 2.75 <=3.10 9.1 >=7.0 100 8.19 Wrist - ADM      35.3      B.Elbow ADM 5.77  7.2  79.1 8.08 B.Elbow - Wrist 18 3.02  60 >=50 35.3      A.Elbow ADM 7.65  7.0  77.2 8.42 A.Elbow - B.Elbow 12 1.88  64  35.3   L Median, Ulnar - Lumbrical-Interossei      Median Wrist Lumb II        Median Wrist - Lumb II 10     35.3      Ulnar Wrist Lumb II 2.96  8.1   5.02 Ulnar Wrist - Lumb II 10     35.3           Median Wrist - Ulnar Wrist   <=0.50   35.3       F  Wave      Nerve Fmin Ref.    ms ms   L Median - APB 24.53 <=31.00   L Ulnar - ADM 25.78 <=32.00       EMG Summary Table     Spontaneous Recruitment Activation Duration Amplitude Polyphasia Comment   Muscle Ins Act Fib Fasc Pattern - - - - -   L. First dorsal interosseous Normal 0 0 Normal Normal Normal Normal Normal Normal   L. Abductor pollicis brevis Normal 0 0 Normal Normal Normal Normal Normal Normal   L. Pronator teres Normal 0 0 Normal Normal Normal Normal Normal Normal   L. Flexor digitorum profundus (Ulnar) Normal 0 0 Normal Normal Normal Normal Normal Normal   L. Extensor digitorum communis Normal 0 0 Normal Normal Normal Normal Normal Normal   L. Flexor carpi ulnaris Normal 0 0 Normal Normal Normal Normal Normal Normal   L. Biceps brachii Normal 0 0 Normal Normal Normal Normal Normal Normal   L. Triceps brachii Normal 0 0 Normal Normal Normal Normal Normal Normal   L. Deltoid Normal 0 0 Normal Normal Normal Normal Normal Normal   L. Cervical paraspinals Normal 0 0      Normal         Mely Maximiliano [id number] 11/11/2024 12:58 PM     4 of 4    Summary:  Nerve conduction studies were performed on the left upper extremity.  Left median and ulnar sensory responses were normal in amplitude and latencies.  Left median and ulnar motor responses were normal in amplitude, latencies and velocity.  Left median and ulnar minimal F-wave latencies were normal.  Due to concerns for carpal tunnel syndrome further internal comparison studies were performed.  Left median versus ulnar 2nd lumbrical interosseous comparison study revealed no significant difference in latencies between the median and ulnar nerves.  Left median versus ulnar mixed palmar comparison studies revealed no significant difference in latencies between the median and ulnar nerves.   Needle EMG was performed in the left upper extremity and cervical paraspinal muscles.  No active denervation was present in any muscle tested.  Motor unit morphology and recruitment patterns were normal in every muscle tested.    Impression:  This is a normal EMG of the left upper extremity.  There was no electrophysiologic evidence of a focal neuropathy, peripheral neuropathy, plexopathy, or radiculopathy on this study.      Thank you for referring to the [facility] in [place]. Please feel free to contact the clinic if you have any further questions regarding this study or report.       _____________________________  Tara Howell D.O., ABPN, AOBNP, REZA Viera [id number] 11/11/2024 12:58 PM     4 of 4

## 2025-02-14 ENCOUNTER — LAB VISIT (OUTPATIENT)
Dept: LAB | Facility: HOSPITAL | Age: 65
End: 2025-02-14
Attending: FAMILY MEDICINE
Payer: COMMERCIAL

## 2025-02-14 ENCOUNTER — OFFICE VISIT (OUTPATIENT)
Dept: FAMILY MEDICINE | Facility: CLINIC | Age: 65
End: 2025-02-14
Payer: COMMERCIAL

## 2025-02-14 VITALS
HEART RATE: 64 BPM | SYSTOLIC BLOOD PRESSURE: 102 MMHG | OXYGEN SATURATION: 99 % | HEIGHT: 67 IN | WEIGHT: 116.38 LBS | BODY MASS INDEX: 18.27 KG/M2 | DIASTOLIC BLOOD PRESSURE: 80 MMHG

## 2025-02-14 DIAGNOSIS — I34.0 NONRHEUMATIC MITRAL VALVE REGURGITATION: Primary | ICD-10-CM

## 2025-02-14 DIAGNOSIS — I47.29 NSVT (NONSUSTAINED VENTRICULAR TACHYCARDIA): ICD-10-CM

## 2025-02-14 DIAGNOSIS — H61.91 SKIN LESION OF RIGHT EAR: ICD-10-CM

## 2025-02-14 DIAGNOSIS — I34.0 NONRHEUMATIC MITRAL VALVE REGURGITATION: ICD-10-CM

## 2025-02-14 DIAGNOSIS — F41.9 ANXIETY: ICD-10-CM

## 2025-02-14 DIAGNOSIS — M81.6 LOCALIZED OSTEOPOROSIS WITHOUT CURRENT PATHOLOGICAL FRACTURE: ICD-10-CM

## 2025-02-14 LAB
ALBUMIN SERPL BCP-MCNC: 4.1 G/DL (ref 3.5–5.2)
ALP SERPL-CCNC: 61 U/L (ref 40–150)
ALT SERPL W/O P-5'-P-CCNC: 19 U/L (ref 10–44)
ANION GAP SERPL CALC-SCNC: 10 MMOL/L (ref 8–16)
AST SERPL-CCNC: 34 U/L (ref 10–40)
BILIRUB SERPL-MCNC: 0.6 MG/DL (ref 0.1–1)
BUN SERPL-MCNC: 14 MG/DL (ref 8–23)
CALCIUM SERPL-MCNC: 9.4 MG/DL (ref 8.7–10.5)
CHLORIDE SERPL-SCNC: 104 MMOL/L (ref 95–110)
CHOLEST SERPL-MCNC: 187 MG/DL (ref 120–199)
CHOLEST/HDLC SERPL: 4.5 {RATIO} (ref 2–5)
CO2 SERPL-SCNC: 26 MMOL/L (ref 23–29)
CREAT SERPL-MCNC: 0.8 MG/DL (ref 0.5–1.4)
EST. GFR  (NO RACE VARIABLE): >60 ML/MIN/1.73 M^2
GLUCOSE SERPL-MCNC: 92 MG/DL (ref 70–110)
HDLC SERPL-MCNC: 42 MG/DL (ref 40–75)
HDLC SERPL: 22.5 % (ref 20–50)
LDLC SERPL CALC-MCNC: 118.4 MG/DL (ref 63–159)
NONHDLC SERPL-MCNC: 145 MG/DL
POTASSIUM SERPL-SCNC: 4.2 MMOL/L (ref 3.5–5.1)
PROT SERPL-MCNC: 7.6 G/DL (ref 6–8.4)
SODIUM SERPL-SCNC: 140 MMOL/L (ref 136–145)
TRIGL SERPL-MCNC: 133 MG/DL (ref 30–150)

## 2025-02-14 PROCEDURE — 3079F DIAST BP 80-89 MM HG: CPT | Mod: CPTII,S$GLB,, | Performed by: FAMILY MEDICINE

## 2025-02-14 PROCEDURE — 82306 VITAMIN D 25 HYDROXY: CPT | Performed by: FAMILY MEDICINE

## 2025-02-14 PROCEDURE — 1159F MED LIST DOCD IN RCRD: CPT | Mod: CPTII,S$GLB,, | Performed by: FAMILY MEDICINE

## 2025-02-14 PROCEDURE — 80061 LIPID PANEL: CPT | Performed by: FAMILY MEDICINE

## 2025-02-14 PROCEDURE — 99214 OFFICE O/P EST MOD 30 MIN: CPT | Mod: S$GLB,,, | Performed by: FAMILY MEDICINE

## 2025-02-14 PROCEDURE — 36415 COLL VENOUS BLD VENIPUNCTURE: CPT | Mod: PO | Performed by: FAMILY MEDICINE

## 2025-02-14 PROCEDURE — 3008F BODY MASS INDEX DOCD: CPT | Mod: CPTII,S$GLB,, | Performed by: FAMILY MEDICINE

## 2025-02-14 PROCEDURE — 3074F SYST BP LT 130 MM HG: CPT | Mod: CPTII,S$GLB,, | Performed by: FAMILY MEDICINE

## 2025-02-14 PROCEDURE — 99999 PR PBB SHADOW E&M-EST. PATIENT-LVL IV: CPT | Mod: PBBFAC,,, | Performed by: FAMILY MEDICINE

## 2025-02-14 PROCEDURE — 80053 COMPREHEN METABOLIC PANEL: CPT | Performed by: FAMILY MEDICINE

## 2025-02-14 RX ORDER — ALPRAZOLAM 0.25 MG/1
TABLET ORAL
Qty: 30 TABLET | Refills: 5 | Status: SHIPPED | OUTPATIENT
Start: 2025-03-04

## 2025-02-14 RX ORDER — ALENDRONATE SODIUM 70 MG/1
70 TABLET ORAL
Qty: 12 TABLET | Refills: 3 | Status: SHIPPED | OUTPATIENT
Start: 2025-02-14 | End: 2026-02-14

## 2025-02-15 LAB — 25(OH)D3+25(OH)D2 SERPL-MCNC: 64 NG/ML (ref 30–96)

## 2025-02-18 ENCOUNTER — PATIENT MESSAGE (OUTPATIENT)
Dept: FAMILY MEDICINE | Facility: CLINIC | Age: 65
End: 2025-02-18
Payer: COMMERCIAL

## 2025-02-22 ENCOUNTER — RESULTS FOLLOW-UP (OUTPATIENT)
Dept: FAMILY MEDICINE | Facility: CLINIC | Age: 65
End: 2025-02-22

## 2025-02-22 NOTE — PROGRESS NOTES
Assessment:       1. Nonrheumatic mitral valve regurgitation    2. NSVT (nonsustained ventricular tachycardia)    3. Localized osteoporosis without current pathological fracture    4. Anxiety    5. Skin lesion of right ear        Assessment & Plan    Nonrheumatic mitral valve regurgitation:  Stable  -     Lipid Panel; Future; Expected date: 02/14/2025  -     Comprehensive Metabolic Panel; Future; Expected date: 02/14/2025    NSVT (nonsustained ventricular tachycardia):  Improved.  The patient is currently seen the cardiologist.  -     Lipid Panel; Future; Expected date: 02/14/2025  -     Comprehensive Metabolic Panel; Future; Expected date: 02/14/2025    Localized osteoporosis without current pathological fracture:  Stable  -     alendronate (FOSAMAX) 70 MG tablet; Take 1 tablet (70 mg total) by mouth every 7 days.  Dispense: 12 tablet; Refill: 3  -     Vitamin D; Future; Expected date: 02/14/2025    Anxiety:  Stable  -     ALPRAZolam (XANAX) 0.25 MG tablet; TAKE 1 TABLET(0.25 MG) BY MOUTH EVERY NIGHT AS NEEDED FOR INSOMNIA OR ANXIETY  Dispense: 30 tablet; Refill: 5  [place] prescription monitoring program was checked and okay.    Skin lesion of right ear:  New problem workup needed  -     Ambulatory referral/consult to Dermatology; Future; Expected date: 02/21/2025       Assessed patient's mitral valve regurgitation and current symptoms  Evaluated recent ER visit labs, noting elevated glucose likely due to non-fasting state  Considered patient's reported exercise-induced symptoms and current exercise regimen  Reviewed current medications and need for refills  Noted skin lesion on ear requiring dermatology evaluation    NON-RHEUMATIC MITRAL VALVE REGURGITATION:  - Confirmed diagnosis of non-rheumatic mitral valve regurgitation based on 30-day heart monitor results.  - Auscultated the patient's heart and noted that it sounds good today.  - Prescribed metoprolol to be taken during episodes to slow heart rate.  -  Recommend low-level, low-impact aerobic exercise 5 times per week.  - Advised the patient to stay hydrated, especially during exercise, and to drink water with electrolytes.  - Noted that the patient experiences palpitations during exercise and at rest.  - Advised the patient to avoid high-intensity training and stay in heart rate zones 1-2.  - Instructed the patient to manage symptoms by stopping activity, drinking water with electrolytes, and resting when necessary.  - Explained appropriate exercise intensity levels for patient's condition.  - Patient to continue low-impact aerobic exercise 5 times per week.  - Patient to maintain hydration with water and electrolyte drinks, especially during exercise.    DIZZINESS AND LIGHTHEADEDNESS:  - Noted that the patient reports episodes of dizziness, lightheadedness, and nausea during exercise and at rest, occurring about once a month.  - Observed that the patient experiences increased heart rate, facial pallor, and discomfort during exercise.  - Noted that the patient reports experiencing dizziness and lightheadedness during episodes, both during exercise and at rest.    ANXIETY:  - Noted that the patient reports experiencing anxiety-related symptoms, including chest pain and palpitations.  - Continued Xanax prescription for anxiety management.  - Provided a 6-month prescription with pickup date of March 4th.    OSTEOPOROSIS:  - Ordered vitamin D level check due to osteoporosis.  - Refilled alendronate (Fosamax) prescription for 1 year for osteoporosis management.  - Noted that the patient takes vitamin D supplements in winter and calcium in addition to prescribed medication.    HERPES:  - Continued Valtrex prescription as needed for herpes management (prescribed by Dr. Nazario).    PROLAPSED BLADDER (CYSTOCELE):  - Noted that the patient has a prolapsed bladder (cystocele) and is awaiting urologist appointment in April to assess condition after stopping physical  therapy.    DERMATOLOGY REFERRAL:  - Noted that the patient reports a persistent, sometimes painful lesion on the top of her ear for several weeks.  - Examined the lesion with a light.  - Recommend dermatologist evaluation for the ear lesion.  - Provided referral to dermatologist Dr. Morton for evaluation of the skin lesion.    LABS:  - Explained that elevated glucose in ER labs likely due to non-fasting state.  - Fasting glucose test ordered.  - Cholesterol panel ordered.  - Vitamin D level test ordered.    DIET:  - Patient to continue current diet and healthy eating habits.    FOLLOW UP:  - Follow up in 6 months.  - Follow up appointment to be scheduled later than 8 AM for next visit.  - Contact the office if lab results message not received by Monday.                 Patient agreed with assessment and plan. Patient verbalized understanding.     Subjective:       Patient ID: Mely Viera is a 64 y.o. female.    Chief Complaint: Follow-up (6 month follow up )      History of Present Illness    CHIEF COMPLAINT:  Patient presents for a follow-up visit to discuss recent ER visit, ongoing cardiac symptoms, and to request medication refills.    HPI:  Patient reports ongoing cardiac symptoms, including episodes of dizziness, lightheadedness, and nausea. She had a significant episode in January that led to an emergency room visit, with chest pain, dizziness, lightheadedness, and subsequent vomiting. She was given medication to take during these episodes, but it may have caused her to vomit, suggesting a possible adverse reaction.    She has been diagnosed with non-rheumatic mitral valve regurgitation by her cardiologist following 30 days of heart monitoring. The cardiologist has informed her that this condition will worsen over time and may eventually require surgical intervention, either repair or replacement of the valve.    She has cardiac episodes approximately once a month, occurring both during exercise and at  rest. During these episodes, her heart races, her face turns pale, and she feels dizzy and lightheaded. To manage these symptoms, she drinks water with electrolytes and sometimes needs to lie down with her feet elevated. These episodes can be severe enough that she occasionally needs assistance getting home from her exercise class.    She follows a low-impact aerobic exercise regimen 5 times per week, as recommended by her cardiologist, but still has symptoms during exercise. She has adjusted her routine by alternating between arm and leg exercises, taking breaks from cardio to do weights, and reducing the weight she uses for certain exercises from 10 lbs to 8 lbs.    She mentions a small lesion on the top of her ear present for several weeks. It occasionally causes pain and did not resolve when her daughter attempted to express it 3 weeks ago.    She denies any episodes when not exercising and being able to gain weight despite eating regularly.    MEDICATIONS:  Patient is on Xanax as needed for anxiety and Alendronate (Fosamax) for osteoporosis, with one pill remaining. She is taking Metoprolol as needed for heart rate control during episodes, and Valtrex as needed for herpes. She takes Vitamin D daily during winter months and a calcium supplement. Metoprolol was changed from daily use to an as-needed basis due to low blood pressure.    MEDICAL HISTORY:  Patient has a history of non-rheumatic mitral valve regurgitation, osteoporosis, herpes, and prolapsed bladder.    TEST RESULTS:  In January, during an ER visit, the patient's CBC was within normal range. Her glucose was elevated, though it was not a fasting test. Inflammation markers and granulocyte count were elevated, likely due to vomiting. Patient recently underwent a 30-day heart monitoring.      ROS:  ROS as indicated in HPI.          Past medical history, past social history was reviewed and discussed with the patient.        Objective:      Physical Exam       Vitals: Weight: 116 lbs.  General: No acute distress. Well-developed. Well-nourished.  Eyes: EOMI. Sclerae anicteric.  HENT: Normocephalic. Atraumatic. Nares patent. Moist oral mucosa.  Cardiovascular: Regular rate. Regular rhythm.   Respiratory: Normal respiratory effort. Clear to auscultation bilaterally. No rales. No rhonchi. No wheezing.  Musculoskeletal: No  obvious deformity.  Extremities: No lower extremity edema.  Neurological: Alert & oriented x3. No slurred speech. Normal gait.  Psychiatric: Normal mood. Normal affect. Good insight. Good judgment.  Skin: Warm. Dry. No rash.                   This note was generated with the assistance of ambient listening technology. Verbal consent was obtained by the patient and accompanying visitor(s) for the recording of patient appointment to facilitate this note. I attest to having reviewed and edited the generated note for accuracy, though some syntax or spelling errors may persist. Please contact the author of this note for any clarification.

## 2025-04-21 DIAGNOSIS — I07.1 TRICUSPID VALVE INSUFFICIENCY, UNSPECIFIED ETIOLOGY: ICD-10-CM

## 2025-04-21 DIAGNOSIS — I49.49 PREMATURE BEATS: ICD-10-CM

## 2025-04-21 DIAGNOSIS — I65.21 ATHEROSCLEROSIS OF RIGHT CAROTID ARTERY: ICD-10-CM

## 2025-04-21 DIAGNOSIS — I47.29 NSVT (NONSUSTAINED VENTRICULAR TACHYCARDIA): Primary | ICD-10-CM

## 2025-04-21 RX ORDER — METOPROLOL SUCCINATE 25 MG/1
TABLET, EXTENDED RELEASE ORAL
Qty: 45 TABLET | Refills: 0 | Status: SHIPPED | OUTPATIENT
Start: 2025-04-21

## 2025-04-23 ENCOUNTER — OFFICE VISIT (OUTPATIENT)
Dept: UROGYNECOLOGY | Facility: CLINIC | Age: 65
End: 2025-04-23
Payer: COMMERCIAL

## 2025-04-23 VITALS
WEIGHT: 117 LBS | HEIGHT: 67 IN | TEMPERATURE: 97 F | SYSTOLIC BLOOD PRESSURE: 143 MMHG | HEART RATE: 63 BPM | DIASTOLIC BLOOD PRESSURE: 81 MMHG | BODY MASS INDEX: 18.36 KG/M2

## 2025-04-23 DIAGNOSIS — R39.15 URINARY URGENCY: ICD-10-CM

## 2025-04-23 DIAGNOSIS — N81.2 INCOMPLETE UTEROVAGINAL PROLAPSE: Primary | ICD-10-CM

## 2025-04-23 DIAGNOSIS — N39.41 URGE INCONTINENCE OF URINE: ICD-10-CM

## 2025-04-23 DIAGNOSIS — R35.0 URINARY FREQUENCY: ICD-10-CM

## 2025-04-23 DIAGNOSIS — N39.3 STRESS INCONTINENCE: ICD-10-CM

## 2025-04-23 DIAGNOSIS — N81.11 CYSTOCELE, MIDLINE: ICD-10-CM

## 2025-04-23 DIAGNOSIS — N81.6 RECTOCELE: ICD-10-CM

## 2025-04-23 PROCEDURE — 99999 PR PBB SHADOW E&M-EST. PATIENT-LVL IV: CPT | Mod: PBBFAC,,, | Performed by: OBSTETRICS & GYNECOLOGY

## 2025-04-23 NOTE — PROGRESS NOTES
Chief Complaint   Patient presents with    Vaginal Prolapse        HPI: Patient is a 64 y.o. female  who presents today with c/o a vaginal bulge. Symptoms started over a year ago. She started pelvic floor PT. States that her strength went from 0-1 to 3. The bulge intermittently seems worse. States that lifting or after a bowel movement she notices an increase in the size of the bulge. Denies constipation. Taking fiber, probiotics and eats a lot of fruit and vegetables. She denies accidental bowel leakage.   The vaginal bulge is at the vaginal opening. She may splint to initiate urination but not bowel movements.   Her urine stream is reported as a continuous and strong flow. May occasionally have some discomfort with urination but occurs months apart.     She is voiding every two hours unless she drinks a lot of water. Was voiding more often prior to PT. She does not wake to urinate overnight. She has insomnia and sometimes get out of bed after she is awake while to urinate. She sometimes has severe urgency and can experience urgency urinary incontinence. She infrequently leaks urine with a cough, sneeze or laugh. This may occur with a full bladder only.     She drinks 3-4 twenty ounce cups of water and another 2-3 twelve ounce cups of water. She may do juice in the morning. Does not drink coffee, tea or soda.       Review of Systems   Constitutional:  Positive for fatigue. Negative for activity change, appetite change, chills, fever and unexpected weight change.   HENT:  Positive for mouth dryness. Negative for nasal congestion, dental problem, hearing loss and sore throat.    Eyes:  Negative for pain and eye dryness.   Respiratory:  Negative for cough, shortness of breath and wheezing.    Cardiovascular:  Negative for chest pain, palpitations and leg swelling.   Gastrointestinal:  Negative for abdominal distention, abdominal pain, blood in stool, constipation and rectal pain.   Endocrine: Negative for cold  intolerance and heat intolerance.   Genitourinary:  Positive for hot flashes and vaginal dryness. Negative for dyspareunia.   Musculoskeletal:  Positive for neck stiffness. Negative for arthralgias, back pain, gait problem, joint swelling, myalgias and neck pain.   Integumentary:  Negative for rash.   Neurological:  Positive for light-headedness and headaches. Negative for dizziness, tremors, seizures, speech difficulty, weakness and numbness.   Psychiatric/Behavioral:  Positive for sleep disturbance. Negative for confusion and dysphoric mood. The patient is not nervous/anxious.         Past Medical History:   Diagnosis Date    Anxiety     Chronic insomnia     >40 years    Depression     Heat stroke     HSV (herpes simplex virus) infection     Mitral valve regurgitation 2018       Past Surgical History:   Procedure Laterality Date    COLONOSCOPY N/A 12/06/2022    Procedure: COLONOSCOPY;  Surgeon: Damien Johnson MD;  Location: [facility];  Service: Endoscopy;  Laterality: N/A;    ESOPHAGOGASTRODUODENOSCOPY N/A 12/06/2022    Procedure: EGD (ESOPHAGOGASTRODUODENOSCOPY);  Surgeon: Damien Johnson MD;  Location: [facility];  Service: Endoscopy;  Laterality: N/A;    TONSILLECTOMY  1984       Current Medications[1]    Review of patient's allergies indicates:   Allergen Reactions    Shrimp Swelling     hives    Temazepam Rash and Swelling     temazepam    Egg      Other reaction(s): Hives    Escitalopram      Other reaction(s): did not like effects       Family History   Problem Relation Name Age of Onset    Other (ruptured esophagus) Mother      Bladder Cancer Father      Diabetes Sister      Hypertension Sister      Crohn's disease Sister      Breast cancer Maternal Aunt  49    Ovarian cancer Paternal Cousin 1st 65    COPD Neg Hx      Stomach cancer Neg Hx      Esophageal cancer Neg Hx      Ulcerative colitis Neg Hx      Celiac disease Neg Hx         Social History     Socioeconomic History    Marital status:  Single    Number of children: 1   Occupational History    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      Social Drivers of Health     Financial Resource Strain: Low Risk  (2024)    Overall Financial Resource Strain (CARDIA)     Difficulty of Paying Living Expenses: Not hard at all   Food Insecurity: No Food Insecurity (2024)    Hunger Vital Sign     Worried About Running Out of Food in the Last Year: Never true     Ran Out of Food in the Last Year: Never true   Transportation Needs: No Transportation Needs (10/23/2023)    PRAPARE - Transportation     Lack of Transportation (Medical): No     Lack of Transportation (Non-Medical): No   Physical Activity: Sufficiently Active (2024)    Exercise Vital Sign     Days of Exercise per Week: 3 days     Minutes of Exercise per Session: 60 min   Stress: Stress Concern Present (2024)    [place] [employer] [place] [employer] - Occupational Stress Questionnaire     Feeling of Stress : Very much   Housing Stability: Low Risk  (10/23/2023)    Housing Stability Vital Sign     Unable to Pay for Housing in the Last Year: No     Number of Places Lived in the Last Year: 1     Unstable Housing in the Last Year: No       OB History          1    Para   1    Term   1            AB        Living   1         SAB        IAB        Ectopic        Multiple        Live Births   1                 Gyn History    Mammogram: 24 BIRADS 1b negative with scattered fibbroglandular density   Pap smear: 21 negative  LMP:  Patient's last menstrual period was 08/11/2014.   Postmenopausal bleeding: denies      INITIAL PHYSICAL EXAMINATION    Vitals:    04/23/25 1315   BP: (!) 143/81   Pulse: 63   Temp: 97.4 °F (36.3 °C)      General: Healthy in appearance, Well nourished, Affect Normal, NAD.  Neck: Supple, No masses, Trachea midline, Thyroid normal size,  non-tender, no masses or nodules.  Nodes: No clavicular/cervical adenopathy.  Skin: Normal temperature, No atypical lesions or rash.  Heart: Normal sounds, no murmurs  Lungs: Normal respiratory effort.  Gastrointestinal: Non tender, Non distended, No masses, guarding or  rebound, No hepatosplenomegally, No hernia.  Ext: No clubbing, cyanosis, edema or varicosities.  DTR's: 2+ bilaterally  Strength 5/5 bilateral upper and lower extremities    Genitourinary- (Verbal consent obtained; Female chaperone present during the pelvic exam)    Vulva: normal without lesions, masses, atrophy or pain  Urethra: meatus central and normal in appearance, no prolapse/caruncle, no masses or discharge. Empty supine stress test was negative.  Bladder: non-tender, no masses  Vagina: No discharge or lesions, severe atrophy, no masses appreciated.  [See POP-Q]  Cervix: no lesions, no discharge  Uterus:  non-tender, anteverted, approximately 5 weeks size  Adnexa: no masses, non tender.  Rectal: deferred   Neuro: S2-4- pin prick and light touch intact, Anal wink present  Levator strength: 2/5  Levator tenderness: left 0/10 and right 0/10    POP-Q Exam- pelvic organ prolapse quantitative    Aa +1.5  Anterior Wall Ba +1.5  Anterior wall C -2  Cervix or cuff   Gh 5  Genital hiatus pb 3  perineal body tvl 8  Total vaginal length   Ap -2  Posterior wall Bp -2  Posterior wall D -4  Posterior fornix     with Uterus    POP-Q Stage:3      No visits with results within 1 Day(s) from this visit.   Latest known visit with results is:   Office Visit on 02/28/2025   Component Date Value Ref Range Status    Clinical  Information 02/28/2025 ICD-10: D48.5 Neoplasm of uncertain behavior of skin r/o chondrodermatitis nodularis helicis vs NMSC   Final    Pathologist Name 02/28/2025 YESSI Skinner M.D., Board Certified in Anatomic Pathology, Dermatopathology (electronic signature)   Final    Source (Spec A) 02/28/2025 Skin, helix, right, shave biopsy:   Final    Gross Description (Spec A) 02/28/2025 SEE COMMENT   Final    Micro Description (Spec A) 02/28/2025 Microscopic slide examination was performed and supports the histopathologic diagnosis rendered.   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   Final        ASSESSMENT & PLAN:    Incomplete uterovaginal prolapse  -     Ambulatory referral/consult to Urogynecology    Cystocele, midline    Rectocele    Urinary frequency    Urinary urgency    Urge incontinence of urine    Stress incontinence         Exam findings and treatment options were discussed in detail with the patient. Her symptoms and exam findings are consistent with mixed urinary incontinence and pelvic organ prolapse. We discussed various surgical and nonsurgical management options including pessary use, pelvic floor rehabilitation, and reconstructive surgery. In terms of surgery, vaginal, abdominal and laparoscopic approaches were considered. She feels that her condition is severe enough to consider surgical correction although feels overwhelmed with the information provided today. Would like to take some time to consider her options before proceeding. IUGA handout on SCP provided to the patient. She will be scheduled a virtual visit for further discussion. Reviewed that if we proceed with surgery will have patient undergo urodynamic testing to further assess her bladder function and need for a concurrent anti-incontinence procedure.   All questions were answered today. The patient was encouraged to contact the office as needed with any additional questions or concerns.     Total time  spent on visit was 40 minutes.  This includes face to face time and non-face to face time preparing to see the patient (eg, review of tests), Obtaining and/or reviewing separately obtained history, Documenting clinical information in the electronic or other health record, Independently interpreting resultsand communicating results to the patient/family/caregiver, or Care coordination.    Eliza Henley MD             [1]   Current Outpatient Medications:     alendronate (FOSAMAX) 70 MG tablet, Take 1 tablet (70 mg total) by mouth every 7 days., Disp: 12 tablet, Rfl: 3    ALPRAZolam (XANAX) 0.25 MG tablet, TAKE 1 TABLET(0.25 MG) BY MOUTH EVERY NIGHT AS NEEDED FOR INSOMNIA OR ANXIETY, Disp: 30 tablet, Rfl: 5    metoprolol succinate (TOPROL-XL) 25 MG 24 hr tablet, TAKE 1/2 TABLET(12.5 MG) BY MOUTH DAILY, Disp: 45 tablet, Rfl: 0    valACYclovir (VALTREX) 500 MG tablet, Take 1 tablet (500 mg total) by mouth once daily., Disp: 90 tablet, Rfl: 3

## 2025-05-19 ENCOUNTER — LAB VISIT (OUTPATIENT)
Dept: LAB | Facility: HOSPITAL | Age: 65
End: 2025-05-19
Attending: INTERNAL MEDICINE
Payer: COMMERCIAL

## 2025-05-19 DIAGNOSIS — I65.21 ATHEROSCLEROSIS OF RIGHT CAROTID ARTERY: ICD-10-CM

## 2025-05-19 LAB
CHOLEST SERPL-MCNC: 164 MG/DL (ref 120–199)
CHOLEST/HDLC SERPL: 4 {RATIO} (ref 2–5)
HDLC SERPL-MCNC: 41 MG/DL (ref 40–75)
HDLC SERPL: 25 % (ref 20–50)
LDLC SERPL CALC-MCNC: 93.2 MG/DL (ref 63–159)
NONHDLC SERPL-MCNC: 123 MG/DL
TRIGL SERPL-MCNC: 149 MG/DL (ref 30–150)

## 2025-05-19 PROCEDURE — 80061 LIPID PANEL: CPT

## 2025-05-19 PROCEDURE — 36415 COLL VENOUS BLD VENIPUNCTURE: CPT | Mod: PO

## 2025-05-23 ENCOUNTER — OFFICE VISIT (OUTPATIENT)
Dept: UROGYNECOLOGY | Facility: CLINIC | Age: 65
End: 2025-05-23
Payer: COMMERCIAL

## 2025-05-23 DIAGNOSIS — N39.41 URGE INCONTINENCE OF URINE: ICD-10-CM

## 2025-05-23 DIAGNOSIS — N81.2 INCOMPLETE UTEROVAGINAL PROLAPSE: Primary | ICD-10-CM

## 2025-05-23 DIAGNOSIS — N81.6 RECTOCELE: ICD-10-CM

## 2025-05-23 DIAGNOSIS — N39.3 STRESS INCONTINENCE: ICD-10-CM

## 2025-05-23 DIAGNOSIS — R35.0 URINARY FREQUENCY: ICD-10-CM

## 2025-05-23 DIAGNOSIS — R39.15 URINARY URGENCY: ICD-10-CM

## 2025-05-23 DIAGNOSIS — N81.11 CYSTOCELE, MIDLINE: ICD-10-CM

## 2025-05-23 NOTE — PROGRESS NOTES
The patient location is: [place]  The chief complaint leading to consultation is: Follow up    Visit type: audiovisual    Face to Face time with patient: 12  20 minutes of total time spent on the encounter, which includes face to face time and non-face to face time preparing to see the patient (eg, review of tests), Obtaining and/or reviewing separately obtained history, Documenting clinical information in the electronic or other health record, Independently interpreting results (not separately reported) and communicating results to the patient/family/caregiver, or Care coordination (not separately reported).         Each patient to whom he or she provides medical services by telemedicine is:  (1) informed of the relationship between the physician and patient and the respective role of any other health care provider with respect to management of the patient; and (2) notified that he or she may decline to receive medical services by telemedicine and may withdraw from such care at any time.    Notes:   Incomplete uterovaginal prolapse  -     Simple Urodynamics; Future; Expected date: 05/24/2025    Cystocele, midline    Rectocele    Urinary frequency    Urge incontinence of urine  -     Simple Urodynamics; Future; Expected date: 05/24/2025    Urinary urgency    Stress incontinence  -     Simple Urodynamics; Future; Expected date: 05/24/2025        65 yo with stage 2 incomplete uterovaginal prolapse, overactive bladder syndrome, and mixed urinary incontinence presents for a follow up visit. She was last seen on 4/23/25 and is considering surgical intervention but needed some time to consider her options and process the information provided. Here today for further discussion.     Patient has decided to proceed with surgical intervention for symptoms. Reviewed the surgical plan and pros/ cons of LSH versus TLH with SCP. Risks of mesh erosions reviewed. Patient with no h/o PMB or abnormal pap smears. Reviewed pros and  cons of salpingectomy and oopherectomy. Decided to proceed with salpingectomy alone. Reviewed the need for urodynamic testing and if positive for stress urinary incontinence then the need for an anti-incontinence procedure. Risks, benefits and success rates of Bulkamid versus Midurethral sling were reviewed with the patient.     All questions and concerns were addressed with the patient. Her daughter was present for the discussion today. Patient will be contacted to schedule SUDS and a follow up to review the results and sign the informed consent.     Eliza Henley MD, MPH  Division of Urogynecology  [address] [place] [address], [address]  [place], [place] [id number] [phone]                               98.2

## 2025-05-30 ENCOUNTER — OFFICE VISIT (OUTPATIENT)
Dept: CARDIOLOGY | Facility: CLINIC | Age: 65
End: 2025-05-30
Payer: COMMERCIAL

## 2025-05-30 ENCOUNTER — LAB VISIT (OUTPATIENT)
Dept: LAB | Facility: HOSPITAL | Age: 65
End: 2025-05-30
Attending: INTERNAL MEDICINE
Payer: COMMERCIAL

## 2025-05-30 VITALS
DIASTOLIC BLOOD PRESSURE: 68 MMHG | WEIGHT: 119.69 LBS | HEART RATE: 66 BPM | BODY MASS INDEX: 18.79 KG/M2 | SYSTOLIC BLOOD PRESSURE: 108 MMHG | HEIGHT: 67 IN

## 2025-05-30 DIAGNOSIS — I34.0 NONRHEUMATIC MITRAL VALVE REGURGITATION: Chronic | ICD-10-CM

## 2025-05-30 DIAGNOSIS — I65.21 ATHEROSCLEROSIS OF RIGHT CAROTID ARTERY: Chronic | ICD-10-CM

## 2025-05-30 DIAGNOSIS — R00.2 PALPITATIONS: ICD-10-CM

## 2025-05-30 DIAGNOSIS — R00.2 PALPITATIONS: Primary | ICD-10-CM

## 2025-05-30 DIAGNOSIS — Z82.49 FAMILY HISTORY OF ASCVD: Chronic | ICD-10-CM

## 2025-05-30 LAB
MAGNESIUM SERPL-MCNC: 2.3 MG/DL (ref 1.6–2.6)
POTASSIUM SERPL-SCNC: 4.5 MMOL/L (ref 3.5–5.1)

## 2025-05-30 PROCEDURE — 3074F SYST BP LT 130 MM HG: CPT | Mod: CPTII,S$GLB,, | Performed by: INTERNAL MEDICINE

## 2025-05-30 PROCEDURE — 84132 ASSAY OF SERUM POTASSIUM: CPT

## 2025-05-30 PROCEDURE — 99999 PR PBB SHADOW E&M-EST. PATIENT-LVL III: CPT | Mod: PBBFAC,,, | Performed by: INTERNAL MEDICINE

## 2025-05-30 PROCEDURE — 3008F BODY MASS INDEX DOCD: CPT | Mod: CPTII,S$GLB,, | Performed by: INTERNAL MEDICINE

## 2025-05-30 PROCEDURE — 36415 COLL VENOUS BLD VENIPUNCTURE: CPT | Mod: PO

## 2025-05-30 PROCEDURE — 1159F MED LIST DOCD IN RCRD: CPT | Mod: CPTII,S$GLB,, | Performed by: INTERNAL MEDICINE

## 2025-05-30 PROCEDURE — 3078F DIAST BP <80 MM HG: CPT | Mod: CPTII,S$GLB,, | Performed by: INTERNAL MEDICINE

## 2025-05-30 PROCEDURE — 83735 ASSAY OF MAGNESIUM: CPT

## 2025-05-30 PROCEDURE — 99214 OFFICE O/P EST MOD 30 MIN: CPT | Mod: S$GLB,,, | Performed by: INTERNAL MEDICINE

## 2025-05-30 NOTE — PROGRESS NOTES
Subjective:    Patient ID:  Mely Viera is a 64 y.o. female who presents for Follow-up (7 month ), Heart Problem, and Valvular Heart Disease        HPI   DISCUSSED TESTS,LDL 93 DOWN FROM 118, HDL 41, , ABN EKG IN JANUARY, FOR PALP,/ER,  N/V DEHYDRATION, , DOING WELL, MORE PALP, STRESS, NOT TAKING METOPROLOL, DAILY,  RECORDS REVIEWED,SEE ROS    Past Medical History:   Diagnosis Date    Anxiety     Chronic insomnia     >40 years    Depression     Heat stroke     HSV (herpes simplex virus) infection     Mitral valve regurgitation 2018     Past Surgical History:   Procedure Laterality Date    COLONOSCOPY N/A 12/06/2022    Procedure: COLONOSCOPY;  Surgeon: Damien Johnson MD;  Location: [facility];  Service: Endoscopy;  Laterality: N/A;    ESOPHAGOGASTRODUODENOSCOPY N/A 12/06/2022    Procedure: EGD (ESOPHAGOGASTRODUODENOSCOPY);  Surgeon: Damien Johnson MD;  Location: [facility] END[facility];  Service: Endoscopy;  Laterality: N/A;    TONSILLECTOMY  1984     Family History   Problem Relation Name Age of Onset    Other (ruptured esophagus) Mother      Bladder Cancer Father      Diabetes Sister      Hypertension Sister      Crohn's disease Sister      Breast cancer Maternal Aunt  49    Ovarian cancer Paternal Cousin 1st 65    COPD Neg Hx      Stomach cancer Neg Hx      Esophageal cancer Neg Hx      Ulcerative colitis Neg Hx      Celiac disease Neg Hx       Social History     Socioeconomic History    Marital status: Single    Number of children: 1   Occupational History    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      Social Drivers of Health     Financial Resource Strain: Low Risk  (7/25/2024)    Overall Financial Resource Strain (CARDIA)     Difficulty of Paying Living Expenses: Not hard at all   Food Insecurity: No Food Insecurity (7/25/2024)    Hunger Vital Sign     Worried About Running Out of Food in the Last Year: Never true     Ran Out of Food in the Last Year: Never true   Transportation Needs: No Transportation Needs (10/23/2023)    PRAPARE - Transportation     Lack of Transportation (Medical): No     Lack of Transportation (Non-Medical): No   Physical Activity: Sufficiently Active (7/25/2024)    Exercise Vital Sign     Days of Exercise per Week: 3 days     Minutes of Exercise per Session: 60 min   Stress: Stress Concern Present (7/25/2024)    [place] [employer] - Occupational Stress Questionnaire     Feeling of Stress : Very much   Housing Stability: Low Risk  (10/23/2023)    Housing Stability Vital Sign     Unable to Pay for Housing in the Last Year: No     Number of Places Lived in the Last Year: 1     Unstable Housing in the Last Year: No       Review of patient's allergies indicates:   Allergen Reactions    Shrimp Swelling     hives    Temazepam Rash and Swelling     temazepam    Egg      Other reaction(s): Hives    Escitalopram      Other reaction(s): did not like effects     Current Medications[1]    Review of Systems   Constitutional: Negative for chills, decreased appetite, diaphoresis, fever and malaise/fatigue.   HENT:  Negative for congestion and nosebleeds.    Eyes:  Negative for blurred vision and visual disturbance.   Cardiovascular:  Positive for dyspnea on exertion (MINIMAL) and palpitations. Negative for chest pain, claudication, cyanosis, irregular heartbeat, near-syncope, orthopnea, paroxysmal nocturnal dyspnea and syncope.   Respiratory:  Negative for cough, hemoptysis, shortness of breath and wheezing.    Endocrine: Negative for  "polyphagia and polyuria.   Hematologic/Lymphatic: Negative for adenopathy. Does not bruise/bleed easily.   Skin:  Negative for color change and rash.   Musculoskeletal:  Negative for back pain and falls.   Gastrointestinal:  Negative for abdominal pain, dysphagia, jaundice, melena and nausea.   Genitourinary:  Negative for dysuria and flank pain.   Neurological:  Positive for light-headedness. Negative for brief paralysis, dizziness, loss of balance and numbness.   Psychiatric/Behavioral:  Negative for altered mental status and depression.    Allergic/Immunologic: Negative for hives and persistent infections.        Objective:      Vitals:    05/30/25 1005   BP: 108/68   Pulse: 66   Weight: 54.3 kg (119 lb 11.4 oz)   Height: 5' 7" (1.702 m)     Body mass index is 18.75 kg/m².    Physical Exam  Constitutional:       Appearance: Normal appearance. She is underweight.   HENT:      Head: Normocephalic and atraumatic.   Eyes:      Extraocular Movements: Extraocular movements intact.      Conjunctiva/sclera: Conjunctivae normal.   Neck:      Vascular: No carotid bruit or JVD.   Cardiovascular:      Rate and Rhythm: Normal rate and regular rhythm. No extrasystoles are present.     Pulses:           Carotid pulses are 2+ on the right side and 2+ on the left side.       Radial pulses are 2+ on the right side and 2+ on the left side.        Posterior tibial pulses are 2+ on the right side and 2+ on the left side.      Heart sounds: Murmur heard.      Systolic murmur is present with a grade of 1/6 at the lower left sternal border and apex.      No friction rub. No gallop.   Pulmonary:      Effort: Pulmonary effort is normal. No respiratory distress.      Breath sounds: Normal breath sounds and air entry.   Musculoskeletal:      Cervical back: Neck supple.      Right lower leg: No edema.      Left lower leg: No edema.   Skin:     Capillary Refill: Capillary refill takes less than 2 seconds.   Neurological:      General: No " focal deficit present.      Mental Status: She is alert and oriented to person, place, and time.      Cranial Nerves: No cranial nerve deficit.   Psychiatric:         Mood and Affect: Mood normal.         Speech: Speech normal.         Behavior: Behavior normal.                 ..    Chemistry        Component Value Date/Time     02/14/2025 0917    K 4.5 05/30/2025 1032    K 4.2 02/14/2025 0917     02/14/2025 0917    CO2 26 02/14/2025 0917    BUN 14 02/14/2025 0917    CREATININE 0.8 02/14/2025 0917    GLU 92 02/14/2025 0917        Component Value Date/Time    CALCIUM 9.4 02/14/2025 0917    ALKPHOS 61 02/14/2025 0917    AST 34 02/14/2025 0917    ALT 19 02/14/2025 0917    BILITOT 0.6 02/14/2025 0917    ESTGFRAFRICA >60.0 11/11/2020 0742    EGFRNONAA >60.0 11/11/2020 0742            ..  Lab Results   Component Value Date    CHOL 164 05/19/2025    CHOL 187 02/14/2025    CHOL 160 08/16/2023     Lab Results   Component Value Date    HDL 41 05/19/2025    HDL 42 02/14/2025    HDL 43 08/16/2023     Lab Results   Component Value Date    LDLCALC 93.2 05/19/2025    LDLCALC 118.4 02/14/2025    LDLCALC 94.0 08/16/2023     Lab Results   Component Value Date    TRIG 149 05/19/2025    TRIG 133 02/14/2025    TRIG 115 08/16/2023     Lab Results   Component Value Date    CHOLHDL 25.0 05/19/2025    CHOLHDL 22.5 02/14/2025    CHOLHDL 26.9 08/16/2023     ..  Lab Results   Component Value Date    WBC 9.82 01/09/2025    HGB 14.1 01/09/2025    HCT 39.7 01/09/2025    MCV 91 01/09/2025     01/09/2025       Test(s) Reviewed  I have reviewed the following in detail:  [] Stress test   [] Angiography   [] Echocardiogram   [x] Labs   [x] Other:       Assessment:         ICD-10-CM ICD-9-CM   1. Palpitations  R00.2 785.1   2. Nonrheumatic mitral valve regurgitation  I34.0 424.0   3. Atherosclerosis of right carotid artery  I65.21 433.10   4. Family history of ASCVD  Z82.49 V17.49     Problem List Items Addressed This Visit           Cardiac/Vascular    Palpitations - Primary    Relevant Orders    Holter monitor - 72 hour    Magnesium (Completed)    Potassium (Completed)    Family history of ASCVD    Nonrheumatic mitral valve regurgitation    Relevant Orders    Holter monitor - 72 hour    Atherosclerosis of right carotid artery        Plan:      CHECK 72 HOLTER,  CHECK MG, K    ALL CV CLINICALLY STABLE, NO ANGINA, NO HF, NO TIA, ASSESS CLINICAL ARRHYTHMIA,CONTINUE CURRENT MEDS, EDUCATION, DIET, EXERCISE , HYDRATION RETURN TO CLINIC IN FEW WEEKS AFTER TESTS        Palpitations  -     Holter monitor - 72 hour; Future  -     Magnesium; Future; Expected date: 05/30/2025  -     Potassium; Future; Expected date: 05/30/2025    Nonrheumatic mitral valve regurgitation  -     Holter monitor - 72 hour; Future    Atherosclerosis of right carotid artery    Family history of ASCVD    RTC Low level/low impact aerobic exercise 5x's/wk. Heart healthy diet and risk factor modification.    See labs and med orders.    Aerobic exercise 5x's/wk. Heart healthy diet and risk factor modification.    See labs and med orders.             [1]   Current Outpatient Medications:     alendronate (FOSAMAX) 70 MG tablet, Take 1 tablet (70 mg total) by mouth every 7 days., Disp: 12 tablet, Rfl: 3    ALPRAZolam (XANAX) 0.25 MG tablet, TAKE 1 TABLET(0.25 MG) BY MOUTH EVERY NIGHT AS NEEDED FOR INSOMNIA OR ANXIETY, Disp: 30 tablet, Rfl: 5    metoprolol succinate (TOPROL-XL) 25 MG 24 hr tablet, TAKE 1/2 TABLET(12.5 MG) BY MOUTH DAILY, Disp: 45 tablet, Rfl: 0    valACYclovir (VALTREX) 500 MG tablet, Take 1 tablet (500 mg total) by mouth once daily., Disp: 90 tablet, Rfl: 3     (0) independent

## 2025-05-31 ENCOUNTER — RESULTS FOLLOW-UP (OUTPATIENT)
Dept: CARDIOLOGY | Facility: CLINIC | Age: 65
End: 2025-05-31

## 2025-06-04 ENCOUNTER — HOSPITAL ENCOUNTER (OUTPATIENT)
Dept: CARDIOLOGY | Facility: HOSPITAL | Age: 65
Discharge: HOME | End: 2025-06-04
Attending: INTERNAL MEDICINE
Payer: COMMERCIAL

## 2025-06-04 DIAGNOSIS — I34.0 NONRHEUMATIC MITRAL VALVE REGURGITATION: Chronic | ICD-10-CM

## 2025-06-04 DIAGNOSIS — R00.2 PALPITATIONS: ICD-10-CM

## 2025-06-04 PROCEDURE — 93242 EXT ECG>48HR<7D RECORDING: CPT | Mod: PO

## 2025-08-15 ENCOUNTER — OFFICE VISIT (OUTPATIENT)
Dept: FAMILY MEDICINE | Facility: CLINIC | Age: 65
End: 2025-08-15
Payer: COMMERCIAL

## 2025-08-15 VITALS
BODY MASS INDEX: 19.03 KG/M2 | WEIGHT: 121.25 LBS | DIASTOLIC BLOOD PRESSURE: 60 MMHG | HEIGHT: 67 IN | SYSTOLIC BLOOD PRESSURE: 114 MMHG | OXYGEN SATURATION: 96 % | HEART RATE: 72 BPM

## 2025-08-15 DIAGNOSIS — Z12.31 ENCOUNTER FOR SCREENING MAMMOGRAM FOR MALIGNANT NEOPLASM OF BREAST: ICD-10-CM

## 2025-08-15 DIAGNOSIS — F41.9 ANXIETY: ICD-10-CM

## 2025-08-15 DIAGNOSIS — M81.8 OTHER OSTEOPOROSIS WITHOUT CURRENT PATHOLOGICAL FRACTURE: Primary | ICD-10-CM

## 2025-08-15 DIAGNOSIS — R00.2 PALPITATIONS: ICD-10-CM

## 2025-08-15 PROCEDURE — 1159F MED LIST DOCD IN RCRD: CPT | Mod: CPTII,S$GLB,, | Performed by: FAMILY MEDICINE

## 2025-08-15 PROCEDURE — 3074F SYST BP LT 130 MM HG: CPT | Mod: CPTII,S$GLB,, | Performed by: FAMILY MEDICINE

## 2025-08-15 PROCEDURE — 99999 PR PBB SHADOW E&M-EST. PATIENT-LVL IV: CPT | Mod: PBBFAC,,, | Performed by: FAMILY MEDICINE

## 2025-08-15 PROCEDURE — 3008F BODY MASS INDEX DOCD: CPT | Mod: CPTII,S$GLB,, | Performed by: FAMILY MEDICINE

## 2025-08-15 PROCEDURE — 3078F DIAST BP <80 MM HG: CPT | Mod: CPTII,S$GLB,, | Performed by: FAMILY MEDICINE

## 2025-08-15 PROCEDURE — 99214 OFFICE O/P EST MOD 30 MIN: CPT | Mod: S$GLB,,, | Performed by: FAMILY MEDICINE

## 2025-08-15 RX ORDER — ALPRAZOLAM 0.25 MG/1
TABLET ORAL
Qty: 30 TABLET | Refills: 5 | Status: SHIPPED | OUTPATIENT
Start: 2025-08-15

## 2025-08-28 ENCOUNTER — HOSPITAL ENCOUNTER (OUTPATIENT)
Dept: RADIOLOGY | Facility: HOSPITAL | Age: 65
Discharge: HOME | End: 2025-08-28
Attending: FAMILY MEDICINE
Payer: COMMERCIAL

## 2025-08-28 DIAGNOSIS — M81.8 OTHER OSTEOPOROSIS WITHOUT CURRENT PATHOLOGICAL FRACTURE: ICD-10-CM

## 2025-08-28 PROCEDURE — 77092 TBS I&R FX RSK QHP: CPT | Mod: ,,, | Performed by: RADIOLOGY

## 2025-08-28 PROCEDURE — 77080 DXA BONE DENSITY AXIAL: CPT | Mod: 26,,, | Performed by: RADIOLOGY

## 2025-08-28 PROCEDURE — 77080 DXA BONE DENSITY AXIAL: CPT | Mod: TC,PO
